# Patient Record
Sex: FEMALE | Race: OTHER | HISPANIC OR LATINO | URBAN - METROPOLITAN AREA
[De-identification: names, ages, dates, MRNs, and addresses within clinical notes are randomized per-mention and may not be internally consistent; named-entity substitution may affect disease eponyms.]

---

## 2021-08-23 ENCOUNTER — EMERGENCY (EMERGENCY)
Facility: HOSPITAL | Age: 56
LOS: 1 days | End: 2021-08-23
Admitting: EMERGENCY MEDICINE
Payer: SELF-PAY

## 2021-08-23 PROCEDURE — 99283 EMERGENCY DEPT VISIT LOW MDM: CPT

## 2023-06-16 ENCOUNTER — APPOINTMENT (OUTPATIENT)
Dept: UROGYNECOLOGY | Facility: CLINIC | Age: 58
End: 2023-06-16
Payer: MEDICAID

## 2023-06-16 VITALS
DIASTOLIC BLOOD PRESSURE: 112 MMHG | HEART RATE: 69 BPM | SYSTOLIC BLOOD PRESSURE: 166 MMHG | TEMPERATURE: 97.5 F | WEIGHT: 120 LBS

## 2023-06-16 DIAGNOSIS — N39.498 OTHER SPECIFIED URINARY INCONTINENCE: ICD-10-CM

## 2023-06-16 DIAGNOSIS — N39.492 POSTURAL (URINARY) INCONTINENCE: ICD-10-CM

## 2023-06-16 PROBLEM — Z00.00 ENCOUNTER FOR PREVENTIVE HEALTH EXAMINATION: Status: ACTIVE | Noted: 2023-06-16

## 2023-06-16 PROCEDURE — 99415 PROLNG CLIN STAFF SVC 1ST HR: CPT

## 2023-06-16 PROCEDURE — 99205 OFFICE O/P NEW HI 60 MIN: CPT

## 2023-06-16 NOTE — DISCUSSION/SUMMARY
[FreeTextEntry1] : The patient and her daughter  Akiko ([phone] ) were counseled regarding the pathophysiology of the above conditions.  The visit took 1-1/2-hour due to extensive need to  the patient and her daughter with use of language line .  There were counseled regarding the risks, benefits, indications, and alternatives of further evaluations studies, as well as various management options. She was given verbal and written information/education on pelvic floor muscle exercises, avoidance of dietary bladder irritants, and other strategies to improve bladder control. She was counseled regarding treatment options for pelvic organ prolapse as well as urinary incontinence including pelvic floor PT, pessary placement and surgical management. AUGS interactive tool was used to explain normal anatomy as well as alteration in pelvic organ support associated with prolapse and stress urinary incontinence.  After a detailed discussion, following management plan was outlined:\par 1.  Discussed vaginal as well as reconstructive surgery for pelvic organ prolapse.  Patient does not have a preference.  Due to advanced stage prolapse and her young age age, I would recommend sacrocolpopexy provided she had normal Pap smears in the past and there is no contraindication for supracervical hysterectomy.  They verbalized understanding.  We have requested records of her previous Pap smears and pelvic ultrasound.  We discussed postop restrictions, recovery.  Etc.\par 2. UA with micro and urine culture was ordered to exclude UTI.\par 3.  Recommended urodynamic testing to screen for occult stress urinary incontinence.  They agreed\par 4.  Patient is interested in having a trial of the pessary while awaiting surgery.  They will return after urodynamic testing to finalize the surgical plan plan and possible  pessary fitting\par 5.  OR booking sheet submitted.

## 2023-06-16 NOTE — HISTORY OF PRESENT ILLNESS
[FreeTextEntry1] : Patient is a 57-year-old para ( x 6  ) who is referred by her OBGYN Dr. Heron Aragon  For evaluation and management of vaginal bulge and urinary incontinence. Patient is accompanied by her daughter Chanda. \par Welsh translation provided by language liner  Sara  [id number]\par Patient reports difficulty voiding. She has to be in certain position to void. It hurts when she urinates. She also states that her cervix has been coming out. She feels it between her legs during walking. She started having these issues in 2023. The urine leakage is getting worse. She has to wear a pad for urine leakage. She is describing increasing difficulty emptying her bladder. She has to push it back but sometimes she is unable to reduce it. She crouches to urinate. She has to wear a pad every day and sometimes the pad rubs against the cervix and she will notice some spotting \par Daytime frequency: 8-9 times \par Nocturia: 5 times per night\par Hx of frequent UTI: denies\par Hx of kidney stones: denies\par Daily fluid intake: water, 1 cup of coffee\par \par Bowel symptoms: She denies constipation. Denies diarrhea. Denies fecal incontinence. She has a referral to schedule colonoscopy. \par \par GYN hx: LMP: at age 46 years. Last pap smear was in 2023.Its unclear if it was LGSIL or NILM. She had a cervical biopsy which was normal. Patient was referred to a GYN Onc. Daughter feels that that was a mistake . They were looking for a UROGYN and the referral to GYN Onc was made by mistake. Patient denies previous hx of abnormal pap smears or HPV. She is a  and has been sexually inactive. \par She had a MMG and breast ultrasound which were benign\par \par PMHx: HLD\par \par PShx: Denies

## 2023-06-16 NOTE — ASSESSMENT
[FreeTextEntry1] : Patient is a 57-year-old grandmultipara with complete procidentia, subjective voiding dysfunction, nocturia and postural urinary incontinence. On examination, there is evidence of urethral hypermobility with a negative empty bladder supine cough stress test, and normal postvoid residual.  Patient and daughter denies history of abnormal Pap smears or postmenopausal bleeding.  She is very bothered by the prolapse and wants to undergo surgery as soon as possible\par

## 2023-06-16 NOTE — PHYSICAL EXAM
[FreeTextEntry1] : General: Not in acute distress, alert and oriented x3.\par Neck: Supple. No lymphadenopathy. \par Abdomen: Soft, and nondistended.  She did report some discomfort on palpation of the suprapubic area.  no obvious hepatosplenomegaly. No obvious hernias. \par Pelvic Exam: Normal external female genitalia. Saddle sensory exam S2 to S4 is intact. Perineal reflexes not visualized. Urethra is hypermobile without prolapse, exudates, or lesions. Cough stress test is negative with and without prolapse reduction. Post void residual was checked with I/O cath and was 20 cc of clear urine.  Complete procidentia noted with leading edge of the prolapse being the cervix 4 cm outside the hymen.  The posterior lip of the cervix appears somewhat inflamed and irritated.  No ulceration of the vaginal epithelium noted.  Bimanual exam reveals a small uterus in normal positioning. No palpable adnexal masses or tenderness.\par POPQ: Aa +3, Ba +4, C +4, TVL 8, D-1, GH 5, PB 4.5, Ap +3, Bp +3\par \par

## 2023-06-19 LAB
APPEARANCE: CLEAR
BACTERIA UR CULT: NORMAL
BACTERIA: NEGATIVE /HPF
BILIRUBIN URINE: NEGATIVE
BLOOD URINE: NEGATIVE
CAST: 0 /LPF
COLOR: YELLOW
EPITHELIAL CELLS: 0 /HPF
GLUCOSE QUALITATIVE U: NEGATIVE MG/DL
KETONES URINE: NEGATIVE MG/DL
LEUKOCYTE ESTERASE URINE: NEGATIVE
MICROSCOPIC-UA: NORMAL
NITRITE URINE: NEGATIVE
PH URINE: 6.5
PROTEIN URINE: NEGATIVE MG/DL
RED BLOOD CELLS URINE: 1 /HPF
SPECIFIC GRAVITY URINE: 1.02
UROBILINOGEN URINE: 0.2 MG/DL
WHITE BLOOD CELLS URINE: 0 /HPF

## 2023-06-20 VITALS — HEIGHT: 65 IN

## 2023-06-21 ENCOUNTER — APPOINTMENT (OUTPATIENT)
Dept: OBGYN | Facility: CLINIC | Age: 58
End: 2023-06-21
Payer: MEDICAID

## 2023-06-21 VITALS
HEIGHT: 65 IN | SYSTOLIC BLOOD PRESSURE: 122 MMHG | BODY MASS INDEX: 21.66 KG/M2 | DIASTOLIC BLOOD PRESSURE: 80 MMHG | WEIGHT: 130 LBS

## 2023-06-21 DIAGNOSIS — R35.1 NOCTURIA: ICD-10-CM

## 2023-06-21 DIAGNOSIS — R39.198 OTHER DIFFICULTIES WITH MICTURITION: ICD-10-CM

## 2023-06-21 PROCEDURE — 99202 OFFICE O/P NEW SF 15 MIN: CPT

## 2023-06-21 NOTE — PLAN
[FreeTextEntry1] : I spent 25 minutes with patient and daughter discussing findings, evaluation, and plan by uro gyn\par Patient and daughter voiced understanding  10 minutes documenting   Total 35 minutes.\par \par she will follow up as scheduled with Uro Gyn   she is anxious to get surgery as soon as possible\par \par patient has follow up with GI 8/2023\par \par medical release signed to obtain pap results, pelvic US, and mammogram report from [facility]\par staff to scan reports into system once obtained\par \par Dr Elizabeth

## 2023-07-25 ENCOUNTER — APPOINTMENT (OUTPATIENT)
Dept: UROGYNECOLOGY | Facility: CLINIC | Age: 58
End: 2023-07-25

## 2023-07-31 ENCOUNTER — APPOINTMENT (OUTPATIENT)
Dept: UROGYNECOLOGY | Facility: CLINIC | Age: 58
End: 2023-07-31
Payer: MEDICAID

## 2023-07-31 PROCEDURE — 51741 ELECTRO-UROFLOWMETRY FIRST: CPT

## 2023-07-31 PROCEDURE — 51729 CYSTOMETROGRAM W/VP&UP: CPT

## 2023-07-31 PROCEDURE — T1013A: CUSTOM

## 2023-07-31 PROCEDURE — 51797 INTRAABDOMINAL PRESSURE TEST: CPT

## 2023-07-31 PROCEDURE — 51784 ANAL/URINARY MUSCLE STUDY: CPT

## 2023-08-03 ENCOUNTER — APPOINTMENT (OUTPATIENT)
Dept: UROGYNECOLOGY | Facility: CLINIC | Age: 58
End: 2023-08-03
Payer: MEDICAID

## 2023-08-03 VITALS
DIASTOLIC BLOOD PRESSURE: 76 MMHG | BODY MASS INDEX: 21.66 KG/M2 | WEIGHT: 130 LBS | SYSTOLIC BLOOD PRESSURE: 110 MMHG | HEIGHT: 65 IN

## 2023-08-03 DIAGNOSIS — N81.3 COMPLETE UTEROVAGINAL PROLAPSE: ICD-10-CM

## 2023-08-03 DIAGNOSIS — R35.0 FREQUENCY OF MICTURITION: ICD-10-CM

## 2023-08-03 DIAGNOSIS — Z46.89 ENCOUNTER FOR FITTING AND ADJUSTMENT OF OTHER SPECIFIED DEVICES: ICD-10-CM

## 2023-08-03 PROCEDURE — 99214 OFFICE O/P EST MOD 30 MIN: CPT

## 2023-08-03 NOTE — PROCEDURE
[New] : new [Good Fit] : fit is not good [Refit] : refit needed [None] : no bleeding [FreeTextEntry1] : 5.  And 6 ring with support pessary, #2, #3, #4 Gellhorn pessaries, #2 and #4 cube pessary

## 2023-08-03 NOTE — DISCUSSION/SUMMARY
[FreeTextEntry1] : Patient decided to defer a trial of other pessaries.  She states that she can wait till the surgery.  She has modified her work schedule.  She is no longer lifting heavy items.  She denies any difficulty voiding.  She denies vaginal bleeding. She is a scheduled to undergo Sacrocolpopexy with supracervical hysterectomy and bilateral salpingectomy in October.  She has no questions regarding the procedure.  Reviewed urodynamic test findings.  Based on above findings, we will not perform concomitant mid urethral sling.  She is aware that of the possibility of requiring an anti-incontinence procedure in the future. The entire visit was conducted with the help of a Liberian translation provided by medical assistant Yahaira Easley

## 2023-08-03 NOTE — HISTORY OF PRESENT ILLNESS
[FreeTextEntry1] : Patient is a 57-year-old Burkinan-speaking grandmultipara with complete procidentia, subjective voiding dysfunction, nocturia and postural urinary incontinence who was initially seen on 6/16/2023. On previous examination, there was evidence of urethral hypermobility with a negative empty bladder supine cough stress test, and normal postvoid residual. Patient and daughter denies history of abnormal Pap smears or postmenopausal bleeding. She is very bothered by the prolapse and wants to undergo surgery.  She underwent urodynamic in July 2023 which showed normal cystometric capacity, normal sensation, normal compliance, presence of detrusor overactivity without   incontinence,  absence of stress urinary incontinence and absence of voiding dysfunction. Patient presents today for pessary fitting.  She is scheduled for surgery in October however she would like to see if she can get some relief from the prolapse in the meantime.

## 2023-08-03 NOTE — ASSESSMENT
[FreeTextEntry1] : Patient with advanced stage prolapse and wide genital hiatus who underwent a trial of multiple pessaries but she was unable to retain it due to large genital hiatus.  Her urodynamic testing is negative for evidence of a stress urinary incontinence with and without prolapse reduction

## 2023-08-03 NOTE — PHYSICAL EXAM
[FreeTextEntry1] : General: Not in acute distress, alert and oriented x3. Pelvic Exam: Wide genital hiatus was dilated to approximately 6 cm on Valsalva with complete procidentia again noted.  Cough stress test is negative with and without prolapse reduction.  Several pessaries were tried including #5 #6 ring with support pessary, #2,# 3 and #4 Gellhorn pessaries, as well as #2 and 4 cube pessaries.  Patient was unable to retain these pessaries.  The pessaries almost expelled with Valsalva.  Patient decided to terminate the trial of pessary fitting.

## 2023-09-12 ENCOUNTER — NON-APPOINTMENT (OUTPATIENT)
Age: 58
End: 2023-09-12

## 2023-09-12 ENCOUNTER — APPOINTMENT (OUTPATIENT)
Dept: COLORECTAL SURGERY | Facility: CLINIC | Age: 58
End: 2023-09-12
Payer: MEDICAID

## 2023-09-12 VITALS
WEIGHT: 132 LBS | HEIGHT: 65 IN | SYSTOLIC BLOOD PRESSURE: 105 MMHG | RESPIRATION RATE: 14 BRPM | HEART RATE: 78 BPM | DIASTOLIC BLOOD PRESSURE: 71 MMHG | BODY MASS INDEX: 21.99 KG/M2

## 2023-09-12 PROCEDURE — 99205 OFFICE O/P NEW HI 60 MIN: CPT

## 2023-09-13 ENCOUNTER — NON-APPOINTMENT (OUTPATIENT)
Age: 58
End: 2023-09-13

## 2023-09-13 LAB
ALBUMIN SERPL ELPH-MCNC: 4.3 G/DL
ALP BLD-CCNC: 113 U/L
ALT SERPL-CCNC: 18 U/L
ANION GAP SERPL CALC-SCNC: 14 MMOL/L
AST SERPL-CCNC: 24 U/L
BASOPHILS # BLD AUTO: 0.03 K/UL
BASOPHILS NFR BLD AUTO: 0.5 %
BILIRUB SERPL-MCNC: 0.5 MG/DL
BUN SERPL-MCNC: 21 MG/DL
CALCIUM SERPL-MCNC: 9.3 MG/DL
CEA SERPL-MCNC: 1.1 NG/ML
CHLORIDE SERPL-SCNC: 104 MMOL/L
CO2 SERPL-SCNC: 22 MMOL/L
CREAT SERPL-MCNC: 0.87 MG/DL
EGFR: 78 ML/MIN/1.73M2
EOSINOPHIL # BLD AUTO: 0.07 K/UL
EOSINOPHIL NFR BLD AUTO: 1.3 %
GLUCOSE SERPL-MCNC: 123 MG/DL
HCT VFR BLD CALC: 41.6 %
HGB BLD-MCNC: 13.5 G/DL
IMM GRANULOCYTES NFR BLD AUTO: 0.2 %
LYMPHOCYTES # BLD AUTO: 1.57 K/UL
LYMPHOCYTES NFR BLD AUTO: 28.1 %
MAN DIFF?: NORMAL
MCHC RBC-ENTMCNC: 29.4 PG
MCHC RBC-ENTMCNC: 32.5 GM/DL
MCV RBC AUTO: 90.6 FL
MONOCYTES # BLD AUTO: 0.48 K/UL
MONOCYTES NFR BLD AUTO: 8.6 %
NEUTROPHILS # BLD AUTO: 3.43 K/UL
NEUTROPHILS NFR BLD AUTO: 61.3 %
PLATELET # BLD AUTO: 283 K/UL
POTASSIUM SERPL-SCNC: 4.6 MMOL/L
PROT SERPL-MCNC: 6.4 G/DL
RBC # BLD: 4.59 M/UL
RBC # FLD: 12.7 %
SODIUM SERPL-SCNC: 139 MMOL/L
WBC # FLD AUTO: 5.59 K/UL

## 2023-09-19 ENCOUNTER — RESULT REVIEW (OUTPATIENT)
Age: 58
End: 2023-09-19

## 2023-09-27 ENCOUNTER — APPOINTMENT (OUTPATIENT)
Dept: RADIOLOGY | Facility: CLINIC | Age: 58
End: 2023-09-27
Payer: MEDICAID

## 2023-09-27 ENCOUNTER — APPOINTMENT (OUTPATIENT)
Dept: CT IMAGING | Facility: CLINIC | Age: 58
End: 2023-09-27
Payer: MEDICAID

## 2023-09-27 ENCOUNTER — RESULT REVIEW (OUTPATIENT)
Age: 58
End: 2023-09-27

## 2023-09-27 ENCOUNTER — OUTPATIENT (OUTPATIENT)
Dept: OUTPATIENT SERVICES | Facility: HOSPITAL | Age: 58
LOS: 1 days | End: 2023-09-27
Payer: COMMERCIAL

## 2023-09-27 ENCOUNTER — OUTPATIENT (OUTPATIENT)
Dept: OUTPATIENT SERVICES | Facility: HOSPITAL | Age: 58
LOS: 1 days | End: 2023-09-27
Payer: SELF-PAY

## 2023-09-27 ENCOUNTER — APPOINTMENT (OUTPATIENT)
Dept: MRI IMAGING | Facility: CLINIC | Age: 58
End: 2023-09-27
Payer: MEDICAID

## 2023-09-27 DIAGNOSIS — C20 MALIGNANT NEOPLASM OF RECTUM: ICD-10-CM

## 2023-09-27 DIAGNOSIS — Z00.8 ENCOUNTER FOR OTHER GENERAL EXAMINATION: ICD-10-CM

## 2023-09-27 PROCEDURE — 71045 X-RAY EXAM CHEST 1 VIEW: CPT | Mod: 26

## 2023-09-27 PROCEDURE — 74177 CT ABD & PELVIS W/CONTRAST: CPT

## 2023-09-27 PROCEDURE — 74018 RADEX ABDOMEN 1 VIEW: CPT | Mod: 26

## 2023-09-27 PROCEDURE — 71260 CT THORAX DX C+: CPT

## 2023-09-27 PROCEDURE — 72197 MRI PELVIS W/O & W/DYE: CPT | Mod: 26

## 2023-09-27 PROCEDURE — 71260 CT THORAX DX C+: CPT | Mod: 26

## 2023-09-27 PROCEDURE — 74018 RADEX ABDOMEN 1 VIEW: CPT

## 2023-09-27 PROCEDURE — A9585: CPT

## 2023-09-27 PROCEDURE — 74177 CT ABD & PELVIS W/CONTRAST: CPT | Mod: 26

## 2023-09-27 PROCEDURE — 72197 MRI PELVIS W/O & W/DYE: CPT

## 2023-09-27 PROCEDURE — 71045 X-RAY EXAM CHEST 1 VIEW: CPT

## 2023-09-28 ENCOUNTER — NON-APPOINTMENT (OUTPATIENT)
Age: 58
End: 2023-09-28

## 2023-10-03 ENCOUNTER — NON-APPOINTMENT (OUTPATIENT)
Age: 58
End: 2023-10-03

## 2023-10-04 ENCOUNTER — RESULT REVIEW (OUTPATIENT)
Age: 58
End: 2023-10-04

## 2023-10-04 ENCOUNTER — OUTPATIENT (OUTPATIENT)
Dept: OUTPATIENT SERVICES | Facility: HOSPITAL | Age: 58
LOS: 1 days | End: 2023-10-04
Payer: MEDICAID

## 2023-10-04 VITALS
RESPIRATION RATE: 16 BRPM | SYSTOLIC BLOOD PRESSURE: 178 MMHG | HEIGHT: 62 IN | WEIGHT: 121.25 LBS | TEMPERATURE: 98 F | OXYGEN SATURATION: 100 % | DIASTOLIC BLOOD PRESSURE: 90 MMHG | HEART RATE: 64 BPM

## 2023-10-04 DIAGNOSIS — Z01.818 ENCOUNTER FOR OTHER PREPROCEDURAL EXAMINATION: ICD-10-CM

## 2023-10-04 DIAGNOSIS — Z98.890 OTHER SPECIFIED POSTPROCEDURAL STATES: Chronic | ICD-10-CM

## 2023-10-04 DIAGNOSIS — Z29.9 ENCOUNTER FOR PROPHYLACTIC MEASURES, UNSPECIFIED: ICD-10-CM

## 2023-10-04 LAB
A1C WITH ESTIMATED AVERAGE GLUCOSE RESULT: 5.8 % — HIGH (ref 4–5.6)
ABO RH CONFIRMATION: SIGNIFICANT CHANGE UP
ALBUMIN SERPL ELPH-MCNC: 3.9 G/DL — SIGNIFICANT CHANGE UP (ref 3.3–5)
ALP SERPL-CCNC: 126 U/L — HIGH (ref 40–120)
ALT FLD-CCNC: 56 U/L — SIGNIFICANT CHANGE UP (ref 12–78)
ANION GAP SERPL CALC-SCNC: 1 MMOL/L — LOW (ref 5–17)
APTT BLD: 39.7 SEC — HIGH (ref 24.5–35.6)
AST SERPL-CCNC: 34 U/L — SIGNIFICANT CHANGE UP (ref 15–37)
BASOPHILS # BLD AUTO: 0.03 K/UL — SIGNIFICANT CHANGE UP (ref 0–0.2)
BASOPHILS NFR BLD AUTO: 0.6 % — SIGNIFICANT CHANGE UP (ref 0–2)
BILIRUB DIRECT SERPL-MCNC: 0.1 MG/DL — SIGNIFICANT CHANGE UP (ref 0–0.3)
BILIRUB INDIRECT FLD-MCNC: 0.4 MG/DL — SIGNIFICANT CHANGE UP (ref 0.2–1)
BILIRUB SERPL-MCNC: 0.5 MG/DL — SIGNIFICANT CHANGE UP (ref 0.2–1.2)
BLD GP AB SCN SERPL QL: SIGNIFICANT CHANGE UP
BUN SERPL-MCNC: 12 MG/DL — SIGNIFICANT CHANGE UP (ref 7–23)
CALCIUM SERPL-MCNC: 9.5 MG/DL — SIGNIFICANT CHANGE UP (ref 8.5–10.1)
CEA SERPL-MCNC: 1.6 NG/ML — SIGNIFICANT CHANGE UP (ref 0–3.8)
CHLORIDE SERPL-SCNC: 110 MMOL/L — HIGH (ref 96–108)
CO2 SERPL-SCNC: 29 MMOL/L — SIGNIFICANT CHANGE UP (ref 22–31)
CREAT SERPL-MCNC: 0.68 MG/DL — SIGNIFICANT CHANGE UP (ref 0.5–1.3)
EGFR: 102 ML/MIN/1.73M2 — SIGNIFICANT CHANGE UP
EOSINOPHIL # BLD AUTO: 0.15 K/UL — SIGNIFICANT CHANGE UP (ref 0–0.5)
EOSINOPHIL NFR BLD AUTO: 2.8 % — SIGNIFICANT CHANGE UP (ref 0–6)
ESTIMATED AVERAGE GLUCOSE: 120 MG/DL — HIGH (ref 68–114)
GLUCOSE SERPL-MCNC: 119 MG/DL — HIGH (ref 70–99)
HCT VFR BLD CALC: 43.8 % — SIGNIFICANT CHANGE UP (ref 34.5–45)
HGB BLD-MCNC: 14.7 G/DL — SIGNIFICANT CHANGE UP (ref 11.5–15.5)
IMM GRANULOCYTES NFR BLD AUTO: 0 % — SIGNIFICANT CHANGE UP (ref 0–0.9)
INR BLD: 1.02 RATIO — SIGNIFICANT CHANGE UP (ref 0.85–1.18)
LYMPHOCYTES # BLD AUTO: 1.73 K/UL — SIGNIFICANT CHANGE UP (ref 1–3.3)
LYMPHOCYTES # BLD AUTO: 31.7 % — SIGNIFICANT CHANGE UP (ref 13–44)
MCHC RBC-ENTMCNC: 29.2 PG — SIGNIFICANT CHANGE UP (ref 27–34)
MCHC RBC-ENTMCNC: 33.6 GM/DL — SIGNIFICANT CHANGE UP (ref 32–36)
MCV RBC AUTO: 86.9 FL — SIGNIFICANT CHANGE UP (ref 80–100)
MONOCYTES # BLD AUTO: 0.42 K/UL — SIGNIFICANT CHANGE UP (ref 0–0.9)
MONOCYTES NFR BLD AUTO: 7.7 % — SIGNIFICANT CHANGE UP (ref 2–14)
NEUTROPHILS # BLD AUTO: 3.12 K/UL — SIGNIFICANT CHANGE UP (ref 1.8–7.4)
NEUTROPHILS NFR BLD AUTO: 57.2 % — SIGNIFICANT CHANGE UP (ref 43–77)
PLATELET # BLD AUTO: 265 K/UL — SIGNIFICANT CHANGE UP (ref 150–400)
POTASSIUM SERPL-MCNC: 3.9 MMOL/L — SIGNIFICANT CHANGE UP (ref 3.5–5.3)
POTASSIUM SERPL-SCNC: 3.9 MMOL/L — SIGNIFICANT CHANGE UP (ref 3.5–5.3)
PROT SERPL-MCNC: 7.6 GM/DL — SIGNIFICANT CHANGE UP (ref 6–8.3)
PROTHROM AB SERPL-ACNC: 11.5 SEC — SIGNIFICANT CHANGE UP (ref 9.5–13)
RBC # BLD: 5.04 M/UL — SIGNIFICANT CHANGE UP (ref 3.8–5.2)
RBC # FLD: 12.5 % — SIGNIFICANT CHANGE UP (ref 10.3–14.5)
SODIUM SERPL-SCNC: 140 MMOL/L — SIGNIFICANT CHANGE UP (ref 135–145)
WBC # BLD: 5.45 K/UL — SIGNIFICANT CHANGE UP (ref 3.8–10.5)
WBC # FLD AUTO: 5.45 K/UL — SIGNIFICANT CHANGE UP (ref 3.8–10.5)

## 2023-10-04 PROCEDURE — T1013: CPT

## 2023-10-04 PROCEDURE — 80048 BASIC METABOLIC PNL TOTAL CA: CPT

## 2023-10-04 PROCEDURE — 71046 X-RAY EXAM CHEST 2 VIEWS: CPT

## 2023-10-04 PROCEDURE — 86901 BLOOD TYPING SEROLOGIC RH(D): CPT

## 2023-10-04 PROCEDURE — 82378 CARCINOEMBRYONIC ANTIGEN: CPT

## 2023-10-04 PROCEDURE — 86920 COMPATIBILITY TEST SPIN: CPT

## 2023-10-04 PROCEDURE — 83036 HEMOGLOBIN GLYCOSYLATED A1C: CPT

## 2023-10-04 PROCEDURE — 86850 RBC ANTIBODY SCREEN: CPT

## 2023-10-04 PROCEDURE — 71046 X-RAY EXAM CHEST 2 VIEWS: CPT | Mod: 26

## 2023-10-04 PROCEDURE — 85025 COMPLETE CBC W/AUTO DIFF WBC: CPT

## 2023-10-04 PROCEDURE — 85730 THROMBOPLASTIN TIME PARTIAL: CPT

## 2023-10-04 PROCEDURE — 36415 COLL VENOUS BLD VENIPUNCTURE: CPT

## 2023-10-04 PROCEDURE — 93005 ELECTROCARDIOGRAM TRACING: CPT

## 2023-10-04 PROCEDURE — 80076 HEPATIC FUNCTION PANEL: CPT

## 2023-10-04 PROCEDURE — 86900 BLOOD TYPING SEROLOGIC ABO: CPT

## 2023-10-04 PROCEDURE — 85610 PROTHROMBIN TIME: CPT

## 2023-10-04 PROCEDURE — 99214 OFFICE O/P EST MOD 30 MIN: CPT | Mod: 25

## 2023-10-04 PROCEDURE — 93010 ELECTROCARDIOGRAM REPORT: CPT

## 2023-10-04 NOTE — H&P PST ADULT - ASSESSMENT
57 y.o female  57 y.o female scheduled for  Laparoscopic possible Open Low Anterior Resection, Mobilization of Splenic Flexure, Rigid Proctosigmoidoscopy, Lymph Node Dissection, and possible Ileostomy   Plan  1. Stop all NSAIDS, herbal supplements and vitamins for 7 days.  2. NPO at midnight.  3. Take the following medications --none-- with small sips of water on the morning of your procedure/surgery.  4. Use EZ sponges as directed  5. Labs, EKG, CXR  as per surgeon  6. PMD E. Dodd visit for optimization prior to surgery as per surgeon  7. Preprocedure education provided     CAPRINI SCORE    AGE RELATED RISK FACTORS                                                       MOBILITY RELATED FACTORS  [x ] Age 41-60 years                                            (1 Point)                  [ ] Bed rest                                                        (1 Point)  [ ] Age: 61-74 years                                           (2 Points)                [ ] Plaster cast                                                   (2 Points)  [ ] Age= 75 years                                              (3 Points)                 [ ] Bed bound for more than 72 hours                   (2 Points)    DISEASE RELATED RISK FACTORS                                               GENDER SPECIFIC FACTORS  [ ] Edema in the lower extremities                       (1 Point)                  [ ] Pregnancy                                                     (1 Point)  [ ] Varicose veins                                               (1 Point)                  [ ] Post-partum < 6 weeks                                   (1 Point)             [ x] BMI > 25 Kg/m2                                            (1 Point)                  [ ] Hormonal therapy  or oral contraception            (1 Point)                 [ ] Sepsis (in the previous month)                        (1 Point)                  [ ] History of pregnancy complications  [ ] Pneumonia or serious lung disease                                               [ ] Unexplained or recurrent                       (1 Point)           (in the previous month)                               (1 Point)  [ ] Abnormal pulmonary function test                     (1 Point)                 SURGERY RELATED RISK FACTORS  [ ] Acute myocardial infarction                              (1 Point)                 [ ]  Section                                            (1 Point)  [ ] Congestive heart failure (in the previous month)  (1 Point)                 [ ] Minor surgery                                                 (1 Point)   [ ] Inflammatory bowel disease                             (1 Point)                 [ ] Arthroscopic surgery                                        (2 Points)  [ ] Central venous access                                    (2 Points)                [ x] General surgery lasting more than 45 minutes   (2 Points)       [ ] Stroke (in the previous month)                          (5 Points)               [ ] Elective arthroplasty                                        (5 Points)                                                                                                                                               HEMATOLOGY RELATED FACTORS                                                 TRAUMA RELATED RISK FACTORS  [ ] Prior episodes of VTE                                     (3 Points)                 [ ] Fracture of the hip, pelvis, or leg                       (5 Points)  [ ] Positive family history for VTE                         (3 Points)                 [ ] Acute spinal cord injury (in the previous month)  (5 Points)  [ ] Prothrombin 36090 A                                      (3 Points)                 [ ] Paralysis  (less than 1 month)                          (5 Points)  [ ] Factor V Leiden                                             (3 Points)                 [ ] Multiple Trauma within 1 month                         (5 Points)  [ ] Lupus anticoagulants                                     (3 Points)                                                           [ ] Anticardiolipin antibodies                                (3 Points)                                                       [ ] High homocysteine in the blood                      (3 Points)                                             [ ] Other congenital or acquired thrombophilia       (3 Points)                                                [ ] Heparin induced thrombocytopenia                  (3 Points)                                          Total Score [      4    ]    The Caprini score indicates this patient is at risk for a VTE event (score 3-5).  Most surgical patients in this group would benefit from pharmacologic prophylaxis.  The surgical team will determine the balance between VTE risk and bleeding risk

## 2023-10-04 NOTE — H&P PST ADULT - HISTORY OF PRESENT ILLNESS
57 y.o WD, WN Italian speaking female presents to PST with recent dx of rectal cancer. Patient reports a change in her bowel habits and rectal bleeding.  57 y.o WD, WN Slovenian speaking female presents to [facility] with recent dx of rectal cancer. Patient reports a change in her bowel habits and rectal bleeding. She had a colonoscopy which revealed a rectal mass with +biopsy.  Patient has followed with colorectal surgeon and scheduled for a Laparoscopic possible Open Low Anterior Resection, Mobilization of Splenic Flexure, Rigid Proctosigmoidoscopy, Lymph Node Dissection, and possible Ileostomy

## 2023-10-04 NOTE — H&P PST ADULT - GENITOURINARY COMMENTS
uterine prolapse---Patient states she needs to adjust her positioning when urinating "squats to urinate"

## 2023-10-05 DIAGNOSIS — Z01.818 ENCOUNTER FOR OTHER PREPROCEDURAL EXAMINATION: ICD-10-CM

## 2023-10-05 RX ORDER — SODIUM CHLORIDE 9 MG/ML
3 INJECTION INTRAMUSCULAR; INTRAVENOUS; SUBCUTANEOUS EVERY 8 HOURS
Refills: 0 | Status: DISCONTINUED | OUTPATIENT
Start: 2023-10-12 | End: 2023-10-16

## 2023-10-12 ENCOUNTER — INPATIENT (INPATIENT)
Facility: HOSPITAL | Age: 58
LOS: 3 days | Discharge: HOME | DRG: 221 | End: 2023-10-16
Attending: COLON & RECTAL SURGERY | Admitting: COLON & RECTAL SURGERY
Payer: MEDICAID

## 2023-10-12 ENCOUNTER — TRANSCRIPTION ENCOUNTER (OUTPATIENT)
Age: 58
End: 2023-10-12

## 2023-10-12 ENCOUNTER — APPOINTMENT (OUTPATIENT)
Dept: COLORECTAL SURGERY | Facility: HOSPITAL | Age: 58
End: 2023-10-12

## 2023-10-12 ENCOUNTER — RESULT REVIEW (OUTPATIENT)
Age: 58
End: 2023-10-12

## 2023-10-12 VITALS
SYSTOLIC BLOOD PRESSURE: 146 MMHG | WEIGHT: 132.06 LBS | DIASTOLIC BLOOD PRESSURE: 91 MMHG | OXYGEN SATURATION: 100 % | RESPIRATION RATE: 16 BRPM | HEART RATE: 75 BPM | HEIGHT: 62 IN | TEMPERATURE: 98 F

## 2023-10-12 DIAGNOSIS — C77.2 SECONDARY AND UNSPECIFIED MALIGNANT NEOPLASM OF INTRA-ABDOMINAL LYMPH NODES: ICD-10-CM

## 2023-10-12 DIAGNOSIS — C19 MALIGNANT NEOPLASM OF RECTOSIGMOID JUNCTION: ICD-10-CM

## 2023-10-12 DIAGNOSIS — Z98.890 OTHER SPECIFIED POSTPROCEDURAL STATES: Chronic | ICD-10-CM

## 2023-10-12 DIAGNOSIS — C20 MALIGNANT NEOPLASM OF RECTUM: ICD-10-CM

## 2023-10-12 LAB
GLUCOSE BLDC GLUCOMTR-MCNC: 101 MG/DL — HIGH (ref 70–99)
GLUCOSE BLDC GLUCOMTR-MCNC: 150 MG/DL — HIGH (ref 70–99)
GLUCOSE BLDC GLUCOMTR-MCNC: 150 MG/DL — HIGH (ref 70–99)
GLUCOSE BLDC GLUCOMTR-MCNC: 160 MG/DL — HIGH (ref 70–99)
GLUCOSE BLDC GLUCOMTR-MCNC: 82 MG/DL — SIGNIFICANT CHANGE UP (ref 70–99)
GLUCOSE BLDC GLUCOMTR-MCNC: 82 MG/DL — SIGNIFICANT CHANGE UP (ref 70–99)

## 2023-10-12 PROCEDURE — 45397 LAP REMOVE RECTUM W/POUCH: CPT

## 2023-10-12 PROCEDURE — 88305 TISSUE EXAM BY PATHOLOGIST: CPT

## 2023-10-12 PROCEDURE — 88309 TISSUE EXAM BY PATHOLOGIST: CPT | Mod: 26

## 2023-10-12 PROCEDURE — 45300 PROCTOSIGMOIDOSCOPY DX: CPT | Mod: AS

## 2023-10-12 PROCEDURE — 85027 COMPLETE CBC AUTOMATED: CPT

## 2023-10-12 PROCEDURE — 36415 COLL VENOUS BLD VENIPUNCTURE: CPT

## 2023-10-12 PROCEDURE — 38570 LAPAROSCOPY LYMPH NODE BIOP: CPT

## 2023-10-12 PROCEDURE — 45300 PROCTOSIGMOIDOSCOPY DX: CPT

## 2023-10-12 PROCEDURE — 88309 TISSUE EXAM BY PATHOLOGIST: CPT

## 2023-10-12 PROCEDURE — C1889: CPT

## 2023-10-12 PROCEDURE — 80048 BASIC METABOLIC PNL TOTAL CA: CPT

## 2023-10-12 PROCEDURE — 82962 GLUCOSE BLOOD TEST: CPT

## 2023-10-12 PROCEDURE — 45397 LAP REMOVE RECTUM W/POUCH: CPT | Mod: AS

## 2023-10-12 PROCEDURE — 84100 ASSAY OF PHOSPHORUS: CPT

## 2023-10-12 PROCEDURE — 38570 LAPAROSCOPY LYMPH NODE BIOP: CPT | Mod: AS

## 2023-10-12 PROCEDURE — 88305 TISSUE EXAM BY PATHOLOGIST: CPT | Mod: 26

## 2023-10-12 PROCEDURE — C1765: CPT

## 2023-10-12 PROCEDURE — 83735 ASSAY OF MAGNESIUM: CPT

## 2023-10-12 RX ORDER — KETOROLAC TROMETHAMINE 30 MG/ML
15 SYRINGE (ML) INJECTION EVERY 6 HOURS
Refills: 0 | Status: DISCONTINUED | OUTPATIENT
Start: 2023-10-12 | End: 2023-10-13

## 2023-10-12 RX ORDER — PANTOPRAZOLE SODIUM 20 MG/1
40 TABLET, DELAYED RELEASE ORAL DAILY
Refills: 0 | Status: DISCONTINUED | OUTPATIENT
Start: 2023-10-12 | End: 2023-10-16

## 2023-10-12 RX ORDER — HEPARIN SODIUM 5000 [USP'U]/ML
5000 INJECTION INTRAVENOUS; SUBCUTANEOUS ONCE
Refills: 0 | Status: COMPLETED | OUTPATIENT
Start: 2023-10-12 | End: 2023-10-12

## 2023-10-12 RX ORDER — CEFOTETAN DISODIUM 1 G
2 VIAL (EA) INJECTION ONCE
Refills: 0 | Status: COMPLETED | OUTPATIENT
Start: 2023-10-13 | End: 2023-10-13

## 2023-10-12 RX ORDER — HYDROMORPHONE HYDROCHLORIDE 2 MG/ML
0.5 INJECTION INTRAMUSCULAR; INTRAVENOUS; SUBCUTANEOUS
Refills: 0 | Status: DISCONTINUED | OUTPATIENT
Start: 2023-10-12 | End: 2023-10-12

## 2023-10-12 RX ORDER — FENTANYL CITRATE 50 UG/ML
25 INJECTION INTRAVENOUS
Refills: 0 | Status: DISCONTINUED | OUTPATIENT
Start: 2023-10-12 | End: 2023-10-12

## 2023-10-12 RX ORDER — OXYCODONE HYDROCHLORIDE 5 MG/1
5 TABLET ORAL EVERY 4 HOURS
Refills: 0 | Status: DISCONTINUED | OUTPATIENT
Start: 2023-10-12 | End: 2023-10-16

## 2023-10-12 RX ORDER — SODIUM CHLORIDE 9 MG/ML
1000 INJECTION, SOLUTION INTRAVENOUS
Refills: 0 | Status: DISCONTINUED | OUTPATIENT
Start: 2023-10-12 | End: 2023-10-12

## 2023-10-12 RX ORDER — ENOXAPARIN SODIUM 100 MG/ML
40 INJECTION SUBCUTANEOUS EVERY 24 HOURS
Refills: 0 | Status: DISCONTINUED | OUTPATIENT
Start: 2023-10-13 | End: 2023-10-16

## 2023-10-12 RX ORDER — ALVIMOPAN 12 MG/1
12 CAPSULE ORAL ONCE
Refills: 0 | Status: COMPLETED | OUTPATIENT
Start: 2023-10-12 | End: 2023-10-12

## 2023-10-12 RX ORDER — INFLUENZA VIRUS VACCINE 15; 15; 15; 15 UG/.5ML; UG/.5ML; UG/.5ML; UG/.5ML
0.5 SUSPENSION INTRAMUSCULAR ONCE
Refills: 0 | Status: DISCONTINUED | OUTPATIENT
Start: 2023-10-12 | End: 2023-10-16

## 2023-10-12 RX ORDER — ONDANSETRON 8 MG/1
4 TABLET, FILM COATED ORAL EVERY 6 HOURS
Refills: 0 | Status: DISCONTINUED | OUTPATIENT
Start: 2023-10-12 | End: 2023-10-16

## 2023-10-12 RX ORDER — SODIUM CHLORIDE 9 MG/ML
1000 INJECTION, SOLUTION INTRAVENOUS
Refills: 0 | Status: DISCONTINUED | OUTPATIENT
Start: 2023-10-12 | End: 2023-10-16

## 2023-10-12 RX ORDER — OXYCODONE HYDROCHLORIDE 5 MG/1
10 TABLET ORAL EVERY 4 HOURS
Refills: 0 | Status: DISCONTINUED | OUTPATIENT
Start: 2023-10-12 | End: 2023-10-16

## 2023-10-12 RX ORDER — ONDANSETRON 8 MG/1
4 TABLET, FILM COATED ORAL ONCE
Refills: 0 | Status: DISCONTINUED | OUTPATIENT
Start: 2023-10-12 | End: 2023-10-12

## 2023-10-12 RX ORDER — ACETAMINOPHEN 500 MG
1000 TABLET ORAL EVERY 6 HOURS
Refills: 0 | Status: COMPLETED | OUTPATIENT
Start: 2023-10-12 | End: 2023-10-13

## 2023-10-12 RX ORDER — ALVIMOPAN 12 MG/1
12 CAPSULE ORAL EVERY 12 HOURS
Refills: 0 | Status: DISCONTINUED | OUTPATIENT
Start: 2023-10-13 | End: 2023-10-15

## 2023-10-12 RX ADMIN — Medication 1000 MILLIGRAM(S): at 23:02

## 2023-10-12 RX ADMIN — ALVIMOPAN 12 MILLIGRAM(S): 12 CAPSULE ORAL at 10:44

## 2023-10-12 RX ADMIN — Medication 15 MILLIGRAM(S): at 22:32

## 2023-10-12 RX ADMIN — SODIUM CHLORIDE 125 MILLILITER(S): 9 INJECTION, SOLUTION INTRAVENOUS at 22:36

## 2023-10-12 RX ADMIN — FENTANYL CITRATE 25 MICROGRAM(S): 50 INJECTION INTRAVENOUS at 16:21

## 2023-10-12 RX ADMIN — Medication 400 MILLIGRAM(S): at 22:32

## 2023-10-12 RX ADMIN — HEPARIN SODIUM 5000 UNIT(S): 5000 INJECTION INTRAVENOUS; SUBCUTANEOUS at 10:43

## 2023-10-12 RX ADMIN — Medication 15 MILLIGRAM(S): at 23:02

## 2023-10-12 RX ADMIN — FENTANYL CITRATE 25 MICROGRAM(S): 50 INJECTION INTRAVENOUS at 16:06

## 2023-10-12 NOTE — BRIEF OPERATIVE NOTE - NSICDXBRIEFPROCEDURE_GEN_ALL_CORE_FT
PROCEDURES:  Laparoscopic low anterior resection 12-Oct-2023 16:02:35  Josefina Hu  Proctectomy, completion, with diverting loop ileostomy 12-Oct-2023 16:03:02  Josefina Hu  Mobilization of splenic flexure 12-Oct-2023 16:03:22  Josefina Hu  Rigid proctosigmoidoscopy 12-Oct-2023 16:03:32  Josefina Hu  Abdominal lymph node dissection 12-Oct-2023 16:03:45  Josefina Hu

## 2023-10-12 NOTE — PATIENT PROFILE ADULT - HEALTH LITERACY
Anesthesia Post Evaluation    Patient: Elizabeth Maldonado    Procedure(s) Performed: Procedure(s) (LRB):  TRANSPLANT, KIDNEY (N/A)    Final Anesthesia Type: general      Patient location during evaluation: PACU  Patient participation: Yes- Able to Participate  Level of consciousness: awake and alert and oriented  Post-procedure vital signs: reviewed and stable  Pain management: adequate  Airway patency: patent    PONV status at discharge: No PONV  Anesthetic complications: no      Cardiovascular status: hemodynamically stable  Respiratory status: unassisted, spontaneous ventilation and room air  Hydration status: euvolemic  Follow-up not needed.        Event Time   Out of Recovery 15:45:00         Pain/Eric Score: No data recorded       no

## 2023-10-12 NOTE — BRIEF OPERATIVE NOTE - OPERATION/FINDINGS
Rectal mass in the rectum within tattoo margins. Low anterior resection and loop ileostomy creation without complications.

## 2023-10-12 NOTE — ASU PREOP CHECKLIST - SPO2 (%)
Dermatology Rooming Note    Yaritza Vuong 's goals for this visit include:   Chief Complaint   Patient presents with     Sharmila Vigil is here today for an acctane follow up.      COREEN Beltre      100

## 2023-10-12 NOTE — BRIEF OPERATIVE NOTE - COMMENTS
I, ROSI Umaña, was present with Dr. De León for the entirety of the procedure, assisting during all key portions of the procedure, including the  skin closure aspect. For further details, please refer to his operative dictation.

## 2023-10-13 LAB
ANION GAP SERPL CALC-SCNC: 4 MMOL/L — LOW (ref 5–17)
BUN SERPL-MCNC: 9 MG/DL — SIGNIFICANT CHANGE UP (ref 7–23)
CALCIUM SERPL-MCNC: 8.6 MG/DL — SIGNIFICANT CHANGE UP (ref 8.5–10.1)
CHLORIDE SERPL-SCNC: 108 MMOL/L — SIGNIFICANT CHANGE UP (ref 96–108)
CO2 SERPL-SCNC: 27 MMOL/L — SIGNIFICANT CHANGE UP (ref 22–31)
CREAT SERPL-MCNC: 1.07 MG/DL — SIGNIFICANT CHANGE UP (ref 0.5–1.3)
EGFR: 61 ML/MIN/1.73M2 — SIGNIFICANT CHANGE UP
GLUCOSE BLDC GLUCOMTR-MCNC: 119 MG/DL — HIGH (ref 70–99)
GLUCOSE BLDC GLUCOMTR-MCNC: 127 MG/DL — HIGH (ref 70–99)
GLUCOSE BLDC GLUCOMTR-MCNC: 134 MG/DL — HIGH (ref 70–99)
GLUCOSE BLDC GLUCOMTR-MCNC: 142 MG/DL — HIGH (ref 70–99)
GLUCOSE SERPL-MCNC: 119 MG/DL — HIGH (ref 70–99)
HCT VFR BLD CALC: 35.8 % — SIGNIFICANT CHANGE UP (ref 34.5–45)
HGB BLD-MCNC: 12 G/DL — SIGNIFICANT CHANGE UP (ref 11.5–15.5)
MAGNESIUM SERPL-MCNC: 2.1 MG/DL — SIGNIFICANT CHANGE UP (ref 1.6–2.6)
MCHC RBC-ENTMCNC: 29 PG — SIGNIFICANT CHANGE UP (ref 27–34)
MCHC RBC-ENTMCNC: 33.5 GM/DL — SIGNIFICANT CHANGE UP (ref 32–36)
MCV RBC AUTO: 86.5 FL — SIGNIFICANT CHANGE UP (ref 80–100)
PHOSPHATE SERPL-MCNC: 3.5 MG/DL — SIGNIFICANT CHANGE UP (ref 2.5–4.5)
PLATELET # BLD AUTO: 259 K/UL — SIGNIFICANT CHANGE UP (ref 150–400)
POTASSIUM SERPL-MCNC: 4.1 MMOL/L — SIGNIFICANT CHANGE UP (ref 3.5–5.3)
POTASSIUM SERPL-SCNC: 4.1 MMOL/L — SIGNIFICANT CHANGE UP (ref 3.5–5.3)
RBC # BLD: 4.14 M/UL — SIGNIFICANT CHANGE UP (ref 3.8–5.2)
RBC # FLD: 12.6 % — SIGNIFICANT CHANGE UP (ref 10.3–14.5)
SODIUM SERPL-SCNC: 139 MMOL/L — SIGNIFICANT CHANGE UP (ref 135–145)
WBC # BLD: 7.8 K/UL — SIGNIFICANT CHANGE UP (ref 3.8–10.5)
WBC # FLD AUTO: 7.8 K/UL — SIGNIFICANT CHANGE UP (ref 3.8–10.5)

## 2023-10-13 PROCEDURE — 99252 IP/OBS CONSLTJ NEW/EST SF 35: CPT

## 2023-10-13 RX ADMIN — Medication 400 MILLIGRAM(S): at 16:41

## 2023-10-13 RX ADMIN — Medication 1000 MILLIGRAM(S): at 16:41

## 2023-10-13 RX ADMIN — Medication 1000 MILLIGRAM(S): at 11:21

## 2023-10-13 RX ADMIN — SODIUM CHLORIDE 3 MILLILITER(S): 9 INJECTION INTRAMUSCULAR; INTRAVENOUS; SUBCUTANEOUS at 14:18

## 2023-10-13 RX ADMIN — Medication 400 MILLIGRAM(S): at 11:21

## 2023-10-13 RX ADMIN — Medication 15 MILLIGRAM(S): at 04:59

## 2023-10-13 RX ADMIN — SODIUM CHLORIDE 3 MILLILITER(S): 9 INJECTION INTRAMUSCULAR; INTRAVENOUS; SUBCUTANEOUS at 00:52

## 2023-10-13 RX ADMIN — ALVIMOPAN 12 MILLIGRAM(S): 12 CAPSULE ORAL at 21:14

## 2023-10-13 RX ADMIN — ALVIMOPAN 12 MILLIGRAM(S): 12 CAPSULE ORAL at 10:03

## 2023-10-13 RX ADMIN — SODIUM CHLORIDE 3 MILLILITER(S): 9 INJECTION INTRAMUSCULAR; INTRAVENOUS; SUBCUTANEOUS at 04:19

## 2023-10-13 RX ADMIN — Medication 100 GRAM(S): at 04:50

## 2023-10-13 RX ADMIN — Medication 15 MILLIGRAM(S): at 09:38

## 2023-10-13 RX ADMIN — Medication 15 MILLIGRAM(S): at 04:49

## 2023-10-13 RX ADMIN — PANTOPRAZOLE SODIUM 40 MILLIGRAM(S): 20 TABLET, DELAYED RELEASE ORAL at 09:38

## 2023-10-13 RX ADMIN — Medication 1000 MILLIGRAM(S): at 04:59

## 2023-10-13 RX ADMIN — OXYCODONE HYDROCHLORIDE 5 MILLIGRAM(S): 5 TABLET ORAL at 14:20

## 2023-10-13 RX ADMIN — Medication 15 MILLIGRAM(S): at 16:41

## 2023-10-13 RX ADMIN — ENOXAPARIN SODIUM 40 MILLIGRAM(S): 100 INJECTION SUBCUTANEOUS at 04:50

## 2023-10-13 RX ADMIN — SODIUM CHLORIDE 3 MILLILITER(S): 9 INJECTION INTRAMUSCULAR; INTRAVENOUS; SUBCUTANEOUS at 21:49

## 2023-10-13 RX ADMIN — Medication 400 MILLIGRAM(S): at 04:49

## 2023-10-13 RX ADMIN — OXYCODONE HYDROCHLORIDE 5 MILLIGRAM(S): 5 TABLET ORAL at 13:50

## 2023-10-13 NOTE — CONSULT NOTE ADULT - SUBJECTIVE AND OBJECTIVE BOX
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 Hospitalist service consulted for medical comanagement   PT seen and examined on 2N. Doing well. Minimal pain.tolerating liquids. ambulating. k[place]land in place. ostomy with liquid .    ROS: all 10 systems reviewed and is as above otherwise negative.     PMH: as above  PSH: denies  F/H: denies significant medical history in immediate family members  Social: no tobacco, occasional etoh  Allergies: NKDA  Home meds: see med rec    Vital Signs Last 24 Hrs  T(C): 37.1 (13 Oct 2023 11:37), Max: 37.5 (13 Oct 2023 00:00)  T(F): 98.8 (13 Oct 2023 11:37), Max: 99.5 (13 Oct 2023 00:00)  HR: 72 (13 Oct 2023 11:37) (58 - 78)  BP: 108/72 (13 Oct 2023 11:37) (100/58 - 116/82)  BP(mean): 76 (12 Oct 2023 18:05) (76 - 76)  RR: 18 (13 Oct 2023 11:37) (12 - 18)  SpO2: 95% (13 Oct 2023 11:37) (95% - 100%)    Parameters below as of 13 Oct 2023 11:37  Patient On (Oxygen Delivery Method): room air    PHYSICAL EXAM:    GENERAL: Comfortable, no acute distress   HEAD:  Normocephalic, atraumatic  EYES: EOMI, PERRLA  HEENT: Moist mucous membranes  NECK: Supple, No JVD  NERVOUS SYSTEM:  Alert & Oriented X3, Motor Strength 5/5 B/L upper and lower extremities  CHEST/LUNG: Clear to auscultation bilaterally  HEART: Regular rate and rhythm  ABDOMEN: Soft,  appropriate post op tenderness, ostomy with brown liquid, BS+  GENITOURINARY: Voiding, no palpable bladder  EXTREMITIES:   No clubbing, cyanosis, or edema  MUSCULOSKELETAL- No muscle tenderness, no joint tenderness  SKIN-no rash    LABS:                        12.0   7.80  )-----------( 259      ( 13 Oct 2023 07:38 )             35.8     10-13    139  |  108  |  9   ----------------------------<  119<H>  4.1   |  27  |  1.07    Ca    8.6      13 Oct 2023 07:38  Phos  3.5     10-13  Mg     2.1     10-13        Urinalysis Basic - ( 13 Oct 2023 07:38 )    Color: x / Appearance: x / SG: x / pH: x  Gluc: 119 mg/dL / Ketone: x  / Bili: x / Urobili: x   Blood: x / Protein: x / Nitrite: x   Leuk Esterase: x / RBC: x / WBC x   Sq Epi: x / Non Sq Epi: x / Bacteria: x    MEDICATIONS  (STANDING):  acetaminophen   IVPB .. 1000 milliGRAM(s) IV Intermittent every 6 hours  alvimopan 12 milliGRAM(s) Oral every 12 hours  enoxaparin Injectable 40 milliGRAM(s) SubCutaneous every 24 hours  influenza   Vaccine 0.5 milliLiter(s) IntraMuscular once  ketorolac   Injectable 15 milliGRAM(s) IV Push every 6 hours  lactated ringers. 1000 milliLiter(s) (75 mL/Hr) IV Continuous <Continuous>  pantoprazole    Tablet 40 milliGRAM(s) Oral daily  sodium chloride 0.9% lock flush 3 milliLiter(s) IV Push every 8 hours    MEDICATIONS  (PRN):  ondansetron Injectable 4 milliGRAM(s) IV Push every 6 hours PRN Nausea and/or Vomiting  oxyCODONE    IR 5 milliGRAM(s) Oral every 4 hours PRN Moderate Pain (4 - 6)  oxyCODONE    IR 10 milliGRAM(s) Oral every 4 hours PRN Severe Pain (7 - 10)      ASSESSMENT AND PLAN:  57f, PMH as above a/w    1. Rectal ca:  -s/p LAR/Ileostomy POD#1-  -liquid diet   -pain control  -ambulate.   -monitor kirkland outpt.   -incentive spirometry.     2. GERD:  -PPI    3. DVT px:  -lovenox    thanks, will follow

## 2023-10-13 NOTE — ADVANCED PRACTICE NURSE CONSULT - ASSESSMENT
Daughter present at bedside. Refused for  use. Daughter speaks english very well and will be helping her mom with care.      Assessment:    Received patient sitting up in bed, awake, made aware of purpose of CWON, agreeable to ostomy teaching. Loop Ileostomy  w/ Rudy drainable pouching system applied  in place, barrier intact, no leakage. NP seal in place      Type of ostomy: Loop Ileostomy   Location: RLQ  Chris: In place and Over the wafer   Size: "  Mucosa:   Peristomal skin:   Mucocutaneous junction:  Abdominal contours: Rounded   Output: ~150c dark brown liquid effluent present in removed pouch  Midline/lap sites/ drains: NP Seal in place      Full ostomy lesson provided to patient. and daughter   Impression:    Patient and daughter ready & willing to learn ostomy care this visit,  looked directly at stoma, asking  appropriate questions & able to verbalize key ostomy points. Patient again observed pouch opening/closing, again reviewed w/ patient that her stool consistency & frequency of Loop Ileostomy  will require her to empty pouch several times each day & again strongly encouraged patient start practicing pouch opening/closing & emptying as this skill is required prior to d/c home, patient agreeable to start practicing pouch emptying. Patient expressed feeling overwhelmed with everything,  much emotional support & encouragement provided to patient.     Reviewed with patient dietary restrictions, s/s & prevention of food obstruction & constipation. Also reviewed w/ patient lifestyle modifications (clothing, bathing/showering, physical activity). Written information regarding all above topics provided to patient.        Will do full assessment if patient is still in the hospital on monday when chris is removed.

## 2023-10-13 NOTE — PROGRESS NOTE ADULT - ASSESSMENT
58 yo F POD 1 s/p low anterior resection with diverting loop ileostomy for rectal cancer. She is stable with awaiting progression per ERAS pathway.    -Likely to advance to low residue diet  -HL IV w/ adequate PO  -Multimodal pain control  -Incentive spirometry  -Periop abx complete  -VTE ppx w/ SCDs & Lovenox  -Ostomy teaching  -Dispo planning    Discussed with Colorectal Surgery attending. 58 yo F POD 1 s/p low anterior resection with diverting loop ileostomy for rectal cancer. She is stable with awaiting progression per ERAS pathway.    -Likely to advance to low residue diet  -HL IV w/ adequate PO  -Multimodal pain control  -Incentive spirometry  -Periop abx complete  -Day to remain for a few days post op  -VTE ppx w/ SCDs & Lovenox  -Ostomy teaching  -Dispo planning    Discussed with Colorectal Surgery attending.

## 2023-10-13 NOTE — ADVANCED PRACTICE NURSE CONSULT - RECOMMEDATIONS
Pouch change: 	  -Gently remove pouch water moistened gauze   -Cleanse stoma site with water moistened gauze, gently pat dry  -Measure stoma, currently   -Trace and cut current size on [place] 2 1/4” CeraPlus flat barrier  -Apply barrier to patient's skin  -Attach [place] 2 1/4” drainable lock and roll pouch onto barrier  Empty pouch when 1/3 to no more than 1/2 full of effluent/flatus. Change pouching system q 3 - 4 days and prn for leaking. Next pouch change-

## 2023-10-14 LAB
ANION GAP SERPL CALC-SCNC: 4 MMOL/L — LOW (ref 5–17)
BUN SERPL-MCNC: 5 MG/DL — LOW (ref 7–23)
CALCIUM SERPL-MCNC: 8.7 MG/DL — SIGNIFICANT CHANGE UP (ref 8.5–10.1)
CHLORIDE SERPL-SCNC: 110 MMOL/L — HIGH (ref 96–108)
CO2 SERPL-SCNC: 27 MMOL/L — SIGNIFICANT CHANGE UP (ref 22–31)
CREAT SERPL-MCNC: 0.63 MG/DL — SIGNIFICANT CHANGE UP (ref 0.5–1.3)
EGFR: 104 ML/MIN/1.73M2 — SIGNIFICANT CHANGE UP
GLUCOSE BLDC GLUCOMTR-MCNC: 110 MG/DL — HIGH (ref 70–99)
GLUCOSE SERPL-MCNC: 103 MG/DL — HIGH (ref 70–99)
HCT VFR BLD CALC: 34 % — LOW (ref 34.5–45)
HGB BLD-MCNC: 11.3 G/DL — LOW (ref 11.5–15.5)
MAGNESIUM SERPL-MCNC: 2 MG/DL — SIGNIFICANT CHANGE UP (ref 1.6–2.6)
MCHC RBC-ENTMCNC: 29.4 PG — SIGNIFICANT CHANGE UP (ref 27–34)
MCHC RBC-ENTMCNC: 33.2 GM/DL — SIGNIFICANT CHANGE UP (ref 32–36)
MCV RBC AUTO: 88.5 FL — SIGNIFICANT CHANGE UP (ref 80–100)
PHOSPHATE SERPL-MCNC: 2.5 MG/DL — SIGNIFICANT CHANGE UP (ref 2.5–4.5)
PLATELET # BLD AUTO: 228 K/UL — SIGNIFICANT CHANGE UP (ref 150–400)
POTASSIUM SERPL-MCNC: 3.7 MMOL/L — SIGNIFICANT CHANGE UP (ref 3.5–5.3)
POTASSIUM SERPL-SCNC: 3.7 MMOL/L — SIGNIFICANT CHANGE UP (ref 3.5–5.3)
RBC # BLD: 3.84 M/UL — SIGNIFICANT CHANGE UP (ref 3.8–5.2)
RBC # FLD: 13 % — SIGNIFICANT CHANGE UP (ref 10.3–14.5)
SODIUM SERPL-SCNC: 141 MMOL/L — SIGNIFICANT CHANGE UP (ref 135–145)
WBC # BLD: 6.29 K/UL — SIGNIFICANT CHANGE UP (ref 3.8–10.5)
WBC # FLD AUTO: 6.29 K/UL — SIGNIFICANT CHANGE UP (ref 3.8–10.5)

## 2023-10-14 PROCEDURE — 99232 SBSQ HOSP IP/OBS MODERATE 35: CPT

## 2023-10-14 RX ORDER — SODIUM,POTASSIUM PHOSPHATES 278-250MG
1 POWDER IN PACKET (EA) ORAL ONCE
Refills: 0 | Status: COMPLETED | OUTPATIENT
Start: 2023-10-14 | End: 2023-10-14

## 2023-10-14 RX ADMIN — OXYCODONE HYDROCHLORIDE 5 MILLIGRAM(S): 5 TABLET ORAL at 17:05

## 2023-10-14 RX ADMIN — OXYCODONE HYDROCHLORIDE 5 MILLIGRAM(S): 5 TABLET ORAL at 01:57

## 2023-10-14 RX ADMIN — OXYCODONE HYDROCHLORIDE 5 MILLIGRAM(S): 5 TABLET ORAL at 18:00

## 2023-10-14 RX ADMIN — SODIUM CHLORIDE 3 MILLILITER(S): 9 INJECTION INTRAMUSCULAR; INTRAVENOUS; SUBCUTANEOUS at 14:01

## 2023-10-14 RX ADMIN — ENOXAPARIN SODIUM 40 MILLIGRAM(S): 100 INJECTION SUBCUTANEOUS at 06:31

## 2023-10-14 RX ADMIN — Medication 1 PACKET(S): at 14:21

## 2023-10-14 RX ADMIN — ALVIMOPAN 12 MILLIGRAM(S): 12 CAPSULE ORAL at 09:37

## 2023-10-14 RX ADMIN — SODIUM CHLORIDE 3 MILLILITER(S): 9 INJECTION INTRAMUSCULAR; INTRAVENOUS; SUBCUTANEOUS at 21:42

## 2023-10-14 RX ADMIN — OXYCODONE HYDROCHLORIDE 5 MILLIGRAM(S): 5 TABLET ORAL at 01:27

## 2023-10-14 RX ADMIN — PANTOPRAZOLE SODIUM 40 MILLIGRAM(S): 20 TABLET, DELAYED RELEASE ORAL at 09:37

## 2023-10-14 RX ADMIN — ALVIMOPAN 12 MILLIGRAM(S): 12 CAPSULE ORAL at 21:31

## 2023-10-14 RX ADMIN — SODIUM CHLORIDE 3 MILLILITER(S): 9 INJECTION INTRAMUSCULAR; INTRAVENOUS; SUBCUTANEOUS at 06:31

## 2023-10-14 NOTE — DIETITIAN INITIAL EVALUATION ADULT - PERTINENT LABORATORY DATA
10-14    141  |  110<H>  |  5<L>  ----------------------------<  103<H>  3.7   |  27  |  0.63    Ca    8.7      14 Oct 2023 07:52  Phos  2.5     10-14  Mg     2.0     10-14    POCT Blood Glucose.: 110 mg/dL (10-14-23 @ 05:38)  A1C with Estimated Average Glucose Result: 5.8 % (10-04-23 @ 09:47)

## 2023-10-14 NOTE — DIETITIAN INITIAL EVALUATION ADULT - PERSON TAUGHT/METHOD
provided verbal and written education in [place]; pt illiterate, left handout at bedside for dtr/verbal instruction/written material/teach back - (Patient repeats in own words)/patient instructed

## 2023-10-14 NOTE — DIETITIAN INITIAL EVALUATION ADULT - OTHER INFO
56 y/o F with a PMHx of Prediabetes, GERD, Rectosigmoid ca who is admitted for elective LAR/Ileostomy. S/p laparoscopic low anterior resection, ileostomy, rigid proctosigmoidoscopy, and abdominal lymph node dissection (10/12).     #[id number]. Pt's diet advanced to Low Fiber last night, pt states that she is tolerating diet with a little bit of pain at surgical site. States that her UBW was 115-120# 3 mon ago, but has been losing weight and now weighs ~105#. Weight taken by RD on 10/14 - 123#; ?accuracy of weight loss. NFPE reveals moderate muscle wasting, pt meets criteria for PCM at this time. C/w Low Fiber diet. Encouraged high kcal/ high protein intake, pt receptive to trialing Ensure Plus High Protein (van) BID in effort to optimize PO intake. Pt previously receiving Ensure on FLD tray and liked the vanilla flavor the best. Provided written and verbal diet education in [place], ?compliance; however left handout for dtr at bedside and pt and dtr educated by Ostomy RN yesterday. Please see additional recommendations below.

## 2023-10-14 NOTE — DIETITIAN INITIAL EVALUATION ADULT - PERTINENT MEDS FT
MEDICATIONS  (STANDING):  alvimopan 12 milliGRAM(s) Oral every 12 hours  enoxaparin Injectable 40 milliGRAM(s) SubCutaneous every 24 hours  influenza   Vaccine 0.5 milliLiter(s) IntraMuscular once  lactated ringers. 1000 milliLiter(s) (75 mL/Hr) IV Continuous <Continuous>  pantoprazole    Tablet 40 milliGRAM(s) Oral daily  sodium chloride 0.9% lock flush 3 milliLiter(s) IV Push every 8 hours    MEDICATIONS  (PRN):  ondansetron Injectable 4 milliGRAM(s) IV Push every 6 hours PRN Nausea and/or Vomiting  oxyCODONE    IR 5 milliGRAM(s) Oral every 4 hours PRN Moderate Pain (4 - 6)  oxyCODONE    IR 10 milliGRAM(s) Oral every 4 hours PRN Severe Pain (7 - 10)    Home Medications:  omeprazole 40 mg oral delayed release capsule: 1 cap(s) orally once a day (12 Oct 2023 10:23)

## 2023-10-14 NOTE — PROGRESS NOTE ADULT - ASSESSMENT
56 yo F POD 2 s/p low anterior resection with diverting loop ileostomy for rectal cancer.   She is stable with awaiting progression per ERAS pathway.    Plan:  -pain control prn  -monitor VS  -diet: continue LRD  -likely [place] removal today and TOV  -VTE ppx w/ SCDs & Lovenox  -Ostomy teaching  -encourage IS/OOB/ambulation  -Dispo planning    Discussed with Colorectal Surgery attending.

## 2023-10-14 NOTE — DIETITIAN INITIAL EVALUATION ADULT - ORAL INTAKE PTA/DIET HISTORY
#[id number]. Reports a okay appetite/ intake pta. Normally consumes 3 meals/ day, however her intake has decreased over the last 3 mon after her Ca dx. Consuming <75% of ENN x3 mon. Pt does not follow any diet restrictions or use any ONS. Pt does the cooking/ shopping at home.

## 2023-10-14 NOTE — DIETITIAN INITIAL EVALUATION ADULT - NSFNSGIIOFT_GEN_A_CORE
10-13-23 @ 07:01  -  10-14-23 @ 07:00  --------------------------------------------------------  OUT:    Ileostomy (mL): 1350 mL  Total OUT: 1350 mL    Total NET: -1350 mL

## 2023-10-14 NOTE — DIETITIAN INITIAL EVALUATION ADULT - ADD RECOMMEND
1) C/w Low Fiber diet  2) Add ensure plus high protein BID to optimize PO intake (provides 350 kcal, 20g protein/ shake)   3) Obtain vitamin D 25OH level to assess nutriture  4) Please obtain weekly weights  5) Consider adding thiamine 100 mg daily 2/2 poor PO intake/ malnutrition  6) MVI w/ minerals daily to ensure 100% RDA met  7) Encourage protein-rich foods, maximize food preferences  8) Monitor bowel movements, if no BM for >3 days, consider implementing bowel regimen.  9) Confirm goals of care regarding nutrition support  RD will continue to monitor PO intake, labs, hydration, and wt prn.

## 2023-10-15 LAB
ANION GAP SERPL CALC-SCNC: 5 MMOL/L — SIGNIFICANT CHANGE UP (ref 5–17)
BUN SERPL-MCNC: 7 MG/DL — SIGNIFICANT CHANGE UP (ref 7–23)
CALCIUM SERPL-MCNC: 9.3 MG/DL — SIGNIFICANT CHANGE UP (ref 8.5–10.1)
CHLORIDE SERPL-SCNC: 110 MMOL/L — HIGH (ref 96–108)
CO2 SERPL-SCNC: 24 MMOL/L — SIGNIFICANT CHANGE UP (ref 22–31)
CREAT SERPL-MCNC: 0.55 MG/DL — SIGNIFICANT CHANGE UP (ref 0.5–1.3)
EGFR: 108 ML/MIN/1.73M2 — SIGNIFICANT CHANGE UP
GLUCOSE SERPL-MCNC: 130 MG/DL — HIGH (ref 70–99)
HCT VFR BLD CALC: 37.2 % — SIGNIFICANT CHANGE UP (ref 34.5–45)
HGB BLD-MCNC: 12.4 G/DL — SIGNIFICANT CHANGE UP (ref 11.5–15.5)
MAGNESIUM SERPL-MCNC: 2.3 MG/DL — SIGNIFICANT CHANGE UP (ref 1.6–2.6)
MCHC RBC-ENTMCNC: 29.5 PG — SIGNIFICANT CHANGE UP (ref 27–34)
MCHC RBC-ENTMCNC: 33.3 GM/DL — SIGNIFICANT CHANGE UP (ref 32–36)
MCV RBC AUTO: 88.6 FL — SIGNIFICANT CHANGE UP (ref 80–100)
PHOSPHATE SERPL-MCNC: 3.1 MG/DL — SIGNIFICANT CHANGE UP (ref 2.5–4.5)
PLATELET # BLD AUTO: 272 K/UL — SIGNIFICANT CHANGE UP (ref 150–400)
POTASSIUM SERPL-MCNC: 4.1 MMOL/L — SIGNIFICANT CHANGE UP (ref 3.5–5.3)
POTASSIUM SERPL-SCNC: 4.1 MMOL/L — SIGNIFICANT CHANGE UP (ref 3.5–5.3)
RBC # BLD: 4.2 M/UL — SIGNIFICANT CHANGE UP (ref 3.8–5.2)
RBC # FLD: 12.8 % — SIGNIFICANT CHANGE UP (ref 10.3–14.5)
SODIUM SERPL-SCNC: 139 MMOL/L — SIGNIFICANT CHANGE UP (ref 135–145)
WBC # BLD: 6.97 K/UL — SIGNIFICANT CHANGE UP (ref 3.8–10.5)
WBC # FLD AUTO: 6.97 K/UL — SIGNIFICANT CHANGE UP (ref 3.8–10.5)

## 2023-10-15 PROCEDURE — 99232 SBSQ HOSP IP/OBS MODERATE 35: CPT

## 2023-10-15 RX ADMIN — ENOXAPARIN SODIUM 40 MILLIGRAM(S): 100 INJECTION SUBCUTANEOUS at 05:28

## 2023-10-15 RX ADMIN — PANTOPRAZOLE SODIUM 40 MILLIGRAM(S): 20 TABLET, DELAYED RELEASE ORAL at 10:03

## 2023-10-15 RX ADMIN — ALVIMOPAN 12 MILLIGRAM(S): 12 CAPSULE ORAL at 10:02

## 2023-10-15 RX ADMIN — SODIUM CHLORIDE 3 MILLILITER(S): 9 INJECTION INTRAMUSCULAR; INTRAVENOUS; SUBCUTANEOUS at 14:20

## 2023-10-15 RX ADMIN — SODIUM CHLORIDE 3 MILLILITER(S): 9 INJECTION INTRAMUSCULAR; INTRAVENOUS; SUBCUTANEOUS at 05:31

## 2023-10-15 RX ADMIN — SODIUM CHLORIDE 3 MILLILITER(S): 9 INJECTION INTRAMUSCULAR; INTRAVENOUS; SUBCUTANEOUS at 22:00

## 2023-10-15 NOTE — PROGRESS NOTE ADULT - NUTRITIONAL ASSESSMENT
This patient has been assessed with a concern for Malnutrition and has been determined to have a diagnosis/diagnoses of Moderate protein-calorie malnutrition.    This patient is being managed with:   Diet Low Fiber-  Entered: Oct 13 2023  6:44PM

## 2023-10-15 NOTE — PROGRESS NOTE ADULT - ASSESSMENT
58 yo F POD 3 s/p low anterior resection with diverting loop ileostomy for rectal cancer.   She is stable with awaiting progression per ERAS pathway.    Plan:  -pain control prn  -monitor VS  -diet: continue LRD  -VTE ppx w/ SCDs & Lovenox  -Ostomy teaching  -encourage IS/OOB/ambulation  -Dispo planning    Discussed with Colorectal Surgery attending.

## 2023-10-15 NOTE — PROGRESS NOTE ADULT - ATTENDING COMMENTS
Patient seen and examined at bedside this morning  Patient is doing well, tolerating a diet, denies N/V, having appropriate stoma output  Abdomen is soft, AT, ND, ostomy pink and productive  A/P: Regular diet, continue IV fluids, discontinue Day, follow up stoma output, stoma education, IS use, OOB with frequent ambulation. CM for discharge needs planing    Vital Signs Last 24 Hrs  T(C): 37.2 (14 Oct 2023 07:58), Max: 37.2 (14 Oct 2023 07:58)  T(F): 99 (14 Oct 2023 07:58), Max: 99 (14 Oct 2023 07:58)  HR: 75 (14 Oct 2023 07:58) (69 - 75)  BP: 160/81 (14 Oct 2023 07:58) (118/64 - 160/81)  BP(mean): 94 (13 Oct 2023 20:00) (81 - 94)  RR: 18 (14 Oct 2023 07:58) (17 - 18)  SpO2: 97% (14 Oct 2023 07:58) (96% - 97%)    Parameters below as of 14 Oct 2023 07:58  Patient On (Oxygen Delivery Method): room air                          11.3   6.29  )-----------( 228      ( 14 Oct 2023 07:52 )             34.0
Patient seen and examined at bedside this morning  Patient is doing well, tolerating food, denies N/V, having appropriate stoma output  Abdomen is soft, AT, ND, ostomy pink and productive  A/P: Continue regular diet, discontinue IV fluids, follow up stoma output quality and quantity, stoma education, IS use, OOB with frequent ambulation. CM for discharge needs planing, plan for discharge within 24hrs    Vital Signs Last 24 Hrs  T(C): 37.2 (15 Oct 2023 07:59), Max: 37.2 (15 Oct 2023 07:59)  T(F): 98.9 (15 Oct 2023 07:59), Max: 98.9 (15 Oct 2023 07:59)  HR: 77 (15 Oct 2023 07:59) (73 - 86)  BP: 144/87 (15 Oct 2023 07:59) (110/82 - 144/87)  BP(mean): --  RR: 18 (15 Oct 2023 07:59) (18 - 18)  SpO2: 97% (15 Oct 2023 07:59) (95% - 100%)    Parameters below as of 15 Oct 2023 07:59  Patient On (Oxygen Delivery Method): room air                          12.4   6.97  )-----------( 272      ( 15 Oct 2023 08:08 )             37.2
Patient seen and examined this morning.  She is postop day 1 from a low anterior resection for rectal cancer with diverting loop ileostomy.  Stable overnight and doing well this morning.  Ileostomy is functioning and incision is covered with dressing.  Abdomen is soft and appropriately tender.  Will advance diet as tolerated, ileostomy teaching, keep Day till tomorrow.  Ambulation and I-S encouraged.

## 2023-10-16 ENCOUNTER — TRANSCRIPTION ENCOUNTER (OUTPATIENT)
Age: 58
End: 2023-10-16

## 2023-10-16 VITALS
TEMPERATURE: 98 F | SYSTOLIC BLOOD PRESSURE: 144 MMHG | RESPIRATION RATE: 16 BRPM | OXYGEN SATURATION: 96 % | HEART RATE: 85 BPM | DIASTOLIC BLOOD PRESSURE: 89 MMHG

## 2023-10-16 LAB
ANION GAP SERPL CALC-SCNC: 5 MMOL/L — SIGNIFICANT CHANGE UP (ref 5–17)
BUN SERPL-MCNC: 10 MG/DL — SIGNIFICANT CHANGE UP (ref 7–23)
CALCIUM SERPL-MCNC: 9.4 MG/DL — SIGNIFICANT CHANGE UP (ref 8.5–10.1)
CHLORIDE SERPL-SCNC: 105 MMOL/L — SIGNIFICANT CHANGE UP (ref 96–108)
CO2 SERPL-SCNC: 28 MMOL/L — SIGNIFICANT CHANGE UP (ref 22–31)
CREAT SERPL-MCNC: 0.61 MG/DL — SIGNIFICANT CHANGE UP (ref 0.5–1.3)
EGFR: 105 ML/MIN/1.73M2 — SIGNIFICANT CHANGE UP
GLUCOSE SERPL-MCNC: 128 MG/DL — HIGH (ref 70–99)
HCT VFR BLD CALC: 37.7 % — SIGNIFICANT CHANGE UP (ref 34.5–45)
HGB BLD-MCNC: 12.7 G/DL — SIGNIFICANT CHANGE UP (ref 11.5–15.5)
MAGNESIUM SERPL-MCNC: 2.3 MG/DL — SIGNIFICANT CHANGE UP (ref 1.6–2.6)
MCHC RBC-ENTMCNC: 29.5 PG — SIGNIFICANT CHANGE UP (ref 27–34)
MCHC RBC-ENTMCNC: 33.7 GM/DL — SIGNIFICANT CHANGE UP (ref 32–36)
MCV RBC AUTO: 87.7 FL — SIGNIFICANT CHANGE UP (ref 80–100)
PHOSPHATE SERPL-MCNC: 3.6 MG/DL — SIGNIFICANT CHANGE UP (ref 2.5–4.5)
PLATELET # BLD AUTO: 290 K/UL — SIGNIFICANT CHANGE UP (ref 150–400)
POTASSIUM SERPL-MCNC: 4.5 MMOL/L — SIGNIFICANT CHANGE UP (ref 3.5–5.3)
POTASSIUM SERPL-SCNC: 4.5 MMOL/L — SIGNIFICANT CHANGE UP (ref 3.5–5.3)
RBC # BLD: 4.3 M/UL — SIGNIFICANT CHANGE UP (ref 3.8–5.2)
RBC # FLD: 12.9 % — SIGNIFICANT CHANGE UP (ref 10.3–14.5)
SODIUM SERPL-SCNC: 138 MMOL/L — SIGNIFICANT CHANGE UP (ref 135–145)
WBC # BLD: 6.23 K/UL — SIGNIFICANT CHANGE UP (ref 3.8–10.5)
WBC # FLD AUTO: 6.23 K/UL — SIGNIFICANT CHANGE UP (ref 3.8–10.5)

## 2023-10-16 PROCEDURE — 99232 SBSQ HOSP IP/OBS MODERATE 35: CPT

## 2023-10-16 RX ORDER — OXYCODONE AND ACETAMINOPHEN 5; 325 MG/1; MG/1
1 TABLET ORAL
Qty: 20 | Refills: 0
Start: 2023-10-16

## 2023-10-16 RX ADMIN — ENOXAPARIN SODIUM 40 MILLIGRAM(S): 100 INJECTION SUBCUTANEOUS at 05:12

## 2023-10-16 RX ADMIN — SODIUM CHLORIDE 3 MILLILITER(S): 9 INJECTION INTRAMUSCULAR; INTRAVENOUS; SUBCUTANEOUS at 05:47

## 2023-10-16 RX ADMIN — PANTOPRAZOLE SODIUM 40 MILLIGRAM(S): 20 TABLET, DELAYED RELEASE ORAL at 10:37

## 2023-10-16 NOTE — DISCHARGE NOTE NURSING/CASE MANAGEMENT/SOCIAL WORK - NSDCPEFALRISK_GEN_ALL_CORE
For information on Fall & Injury Prevention, visit: [email or web address] OR  [email or web address] OR  https://www.cdc.gov/steadi/patient.html

## 2023-10-16 NOTE — DISCHARGE NOTE PROVIDER - NSDCACTIVITY_GEN_ALL_CORE
Showering allowed/Stairs allowed/Walking - Indoors allowed/No heavy lifting/straining/Walking - Outdoors allowed Showering allowed/Stairs allowed/Driving allowed/Walking - Indoors allowed/No heavy lifting/straining/Walking - Outdoors allowed/Follow Instructions Provided by your Surgical Team

## 2023-10-16 NOTE — PROGRESS NOTE ADULT - ASSESSMENT
56 yo F POD 4 s/p low anterior resection with diverting loop ileostomy for rectal cancer.   She is stable, tolerated diet, ostomy functional, ostomy teaching performed    Plan:    -pain control prn  -monitor VS  -diet: continue LRD  -VTE ppx w/ SCDs & Lovenox  -encourage IS/OOB/ambulation  -Dispo planning today    Discussed with Colorectal Surgery attending.

## 2023-10-16 NOTE — DISCHARGE NOTE PROVIDER - NSDCMRMEDTOKEN_GEN_ALL_CORE_FT
omeprazole 40 mg oral delayed release capsule: 1 cap(s) orally once a day   omeprazole 40 mg oral delayed release capsule: 1 cap(s) orally once a day  oxycodone-acetaminophen 5 mg-325 mg oral tablet: 1 tab(s) orally every 6 hours as needed for  moderate pain MDD: 004

## 2023-10-16 NOTE — PROGRESS NOTE ADULT - SUBJECTIVE AND OBJECTIVE BOX
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 Hospitalist service consulted for medical comanagement   PT seen and examined on 2N. Doing well. Minimal pain.tolerating liquids. ambulating. kirkland in place. ostomy with liquid .    10/14/23- up in a chair, states pain is moderate. [place] in.    ROS: all 10 systems reviewed and is as above otherwise negative.     PMH: as above  PSH: denies  F/H: denies significant medical history in immediate family members  Social: no tobacco, occasional etoh  Allergies: NKDA  Home meds: see med rec    Vital Signs Last 24 Hrs  T(C): 37.2 (14 Oct 2023 07:58), Max: 37.2 (14 Oct 2023 07:58)  T(F): 99 (14 Oct 2023 07:58), Max: 99 (14 Oct 2023 07:58)  HR: 75 (14 Oct 2023 07:58) (69 - 75)  BP: 160/81 (14 Oct 2023 07:58) (118/64 - 160/81)  BP(mean): 94 (13 Oct 2023 20:00) (81 - 94)  RR: 18 (14 Oct 2023 07:58) (17 - 18)  SpO2: 97% (14 Oct 2023 07:58) (96% - 97%)    Parameters below as of 14 Oct 2023 07:58  Patient On (Oxygen Delivery Method): room air    PHYSICAL EXAM:  GENERAL: Comfortable, no acute distress   HEAD:  Normocephalic, atraumatic  EYES: EOMI, PERRLA  HEENT: Moist mucous membranes  NECK: Supple, No JVD  NERVOUS SYSTEM:  Alert & Oriented X3, Motor Strength 5/5 B/L upper and lower extremities  CHEST/LUNG: Clear to auscultation bilaterally  HEART: Regular rate and rhythm  ABDOMEN: Soft,  appropriate post op tenderness, ostomy with brown liquid, BS+  GENITOURINARY: Voiding, no palpable bladder  EXTREMITIES:   No clubbing, cyanosis, or edema  MUSCULOSKELETAL- No muscle tenderness, no joint tenderness  SKIN-no rash    LABS:                                   11.3   6.29  )-----------( 228      ( 14 Oct 2023 07:52 )             34.0     14 Oct 2023 07:52    141    |  110    |  5      ----------------------------<  103    3.7     |  27     |  0.63     Ca    8.7        14 Oct 2023 07:52  Phos  2.5       14 Oct 2023 07:52  Mg     2.0       14 Oct 2023 07:52    CAPILLARY BLOOD GLUCOSE  POCT Blood Glucose.: 110 mg/dL (14 Oct 2023 05:38)  POCT Blood Glucose.: 119 mg/dL (13 Oct 2023 21:15)  POCT Blood Glucose.: 134 mg/dL (13 Oct 2023 16:41)    Urinalysis Basic - ( 14 Oct 2023 07:52 )  Color: x / Appearance: x / SG: x / pH: x  Gluc: 103 mg/dL / Ketone: x  / Bili: x / Urobili: x   Blood: x / Protein: x / Nitrite: x   Leuk Esterase: x / RBC: x / WBC x   Sq Epi: x / Non Sq Epi: x / Bacteria: x    MEDICATIONS  (STANDING):  acetaminophen   IVPB .. 1000 milliGRAM(s) IV Intermittent every 6 hours  alvimopan 12 milliGRAM(s) Oral every 12 hours  enoxaparin Injectable 40 milliGRAM(s) SubCutaneous every 24 hours  influenza   Vaccine 0.5 milliLiter(s) IntraMuscular once  ketorolac   Injectable 15 milliGRAM(s) IV Push every 6 hours  lactated ringers. 1000 milliLiter(s) (75 mL/Hr) IV Continuous <Continuous>  pantoprazole    Tablet 40 milliGRAM(s) Oral daily  sodium chloride 0.9% lock flush 3 milliLiter(s) IV Push every 8 hours    MEDICATIONS  (PRN):  ondansetron Injectable 4 milliGRAM(s) IV Push every 6 hours PRN Nausea and/or Vomiting  oxyCODONE    IR 5 milliGRAM(s) Oral every 4 hours PRN Moderate Pain (4 - 6)  oxyCODONE    IR 10 milliGRAM(s) Oral every 4 hours PRN Severe Pain (7 - 10)      ASSESSMENT AND PLAN:  57f, PMH as above a/w    1. Rectal ca:  -s/p LAR/Ileostomy POD#2  -crs team following  -liquid diet   -pain control  -ambulate.   -kirkland per crs team  -incentive spirometry.     2. GERD:  -PPI    3. DVT px:  -lovenox  
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 Hospitalist service consulted for medical comanagement   PT seen and examined on 2N. Doing well. Minimal pain.tolerating liquids. ambulating. kirkland in place. ostomy with liquid .    10/14/23- up in a chair, states pain is moderate. [place] in.  10/15/23- ambulating. [place] removed- voiding. Diet being advanced     ROS: all 10 systems reviewed and is as above otherwise negative.     PMH: as above  PSH: denies  F/H: denies significant medical history in immediate family members  Social: no tobacco, occasional etoh  Allergies: NKDA  Home meds: see med rec    Vital Signs Last 24 Hrs  T(C): 37.2 (15 Oct 2023 07:59), Max: 37.2 (15 Oct 2023 07:59)  T(F): 98.9 (15 Oct 2023 07:59), Max: 98.9 (15 Oct 2023 07:59)  HR: 77 (15 Oct 2023 07:59) (77 - 86)  BP: 144/87 (15 Oct 2023 07:59) (125/75 - 144/87)  BP(mean): --  RR: 18 (15 Oct 2023 07:59) (18 - 18)  SpO2: 97% (15 Oct 2023 07:59) (95% - 97%)    Parameters below as of 15 Oct 2023 07:59  Patient On (Oxygen Delivery Method): room air    PHYSICAL EXAM:  GENERAL: Comfortable, no acute distress   HEAD:  Normocephalic, atraumatic  EYES: EOMI, PERRLA  HEENT: Moist mucous membranes  NECK: Supple, No JVD  NERVOUS SYSTEM:  Alert & Oriented X3, Motor Strength 5/5 B/L upper and lower extremities  CHEST/LUNG: Clear to auscultation bilaterally  HEART: Regular rate and rhythm  ABDOMEN: Soft,  appropriate post op tenderness, ostomy with brown liquid, BS+  GENITOURINARY: Voiding, no palpable bladder  EXTREMITIES:   No clubbing, cyanosis, or edema  MUSCULOSKELETAL- No muscle tenderness, no joint tenderness  SKIN-no rash    LABS:                               12.4   6.97  )-----------( 272      ( 15 Oct 2023 08:08 )             37.2     15 Oct 2023 08:08    139    |  110    |  7      ----------------------------<  130    4.1     |  24     |  0.55     Ca    9.3        15 Oct 2023 08:08  Phos  3.1       15 Oct 2023 08:08  Mg     2.3       15 Oct 2023 08:08    Urinalysis Basic - ( 15 Oct 2023 08:08 )  Color: x / Appearance: x / SG: x / pH: x  Gluc: 130 mg/dL / Ketone: x  / Bili: x / Urobili: x   Blood: x / Protein: x / Nitrite: x   Leuk Esterase: x / RBC: x / WBC x   Sq Epi: x / Non Sq Epi: x / Bacteria: x      MEDICATIONS  (STANDING):  acetaminophen   IVPB .. 1000 milliGRAM(s) IV Intermittent every 6 hours  alvimopan 12 milliGRAM(s) Oral every 12 hours  enoxaparin Injectable 40 milliGRAM(s) SubCutaneous every 24 hours  influenza   Vaccine 0.5 milliLiter(s) IntraMuscular once  ketorolac   Injectable 15 milliGRAM(s) IV Push every 6 hours  lactated ringers. 1000 milliLiter(s) (75 mL/Hr) IV Continuous <Continuous>  pantoprazole    Tablet 40 milliGRAM(s) Oral daily  sodium chloride 0.9% lock flush 3 milliLiter(s) IV Push every 8 hours    MEDICATIONS  (PRN):  ondansetron Injectable 4 milliGRAM(s) IV Push every 6 hours PRN Nausea and/or Vomiting  oxyCODONE    IR 5 milliGRAM(s) Oral every 4 hours PRN Moderate Pain (4 - 6)  oxyCODONE    IR 10 milliGRAM(s) Oral every 4 hours PRN Severe Pain (7 - 10)      ASSESSMENT AND PLAN:  57f, PMH as above a/w    1. Rectal ca:  -s/p LAR/Ileostomy POD#3  -crs team following  -diet being advanced  -pain control  -ambulate.   -kirkland removed- voiding  -incentive spirometry.     2. GERD:  -PPI    3. DVT px:  -lovenox    #Dispo- likely home tomorrow. Per surg team
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 Hospitalist service consulted for medical comanagement   PT seen and examined on 2N. Doing well. Minimal pain.tolerating liquids. ambulating. kirkland in place. ostomy with liquid .    10/14/23- up in a chair, states pain is moderate. [place] in.  10/15/23- ambulating. Kirkland removed- voiding. Diet being advanced   10/16/23- diet advanced, tolerating well. +ostomy output. Ambulating. Wants to go home    ROS: all 10 systems reviewed and is as above otherwise negative.     PMH: as above  PSH: denies  F/H: denies significant medical history in immediate family members  Social: no tobacco, occasional etoh  Allergies: NKDA  Home meds: see med rec    Vital Signs Last 24 Hrs  T(C): 36.8 (16 Oct 2023 08:25), Max: 37.4 (16 Oct 2023 00:00)  T(F): 98.2 (16 Oct 2023 08:25), Max: 99.3 (16 Oct 2023 00:00)  HR: 85 (16 Oct 2023 08:25) (85 - 93)  BP: 144/89 (16 Oct 2023 08:25) (139/80 - 144/89)  BP(mean): --  RR: 16 (16 Oct 2023 08:25) (16 - 18)  SpO2: 96% (16 Oct 2023 08:25) (96% - 97%)    Parameters below as of 16 Oct 2023 08:25  Patient On (Oxygen Delivery Method): room air    PHYSICAL EXAM:  GENERAL: Comfortable, no acute distress   HEAD:  Normocephalic, atraumatic  EYES: EOMI, PERRLA  HEENT: Moist mucous membranes  NECK: Supple, No JVD  NERVOUS SYSTEM:  Alert & Oriented X3, Motor Strength 5/5 B/L upper and lower extremities  CHEST/LUNG: Clear to auscultation bilaterally  HEART: Regular rate and rhythm  ABDOMEN: Soft,  appropriate post op tenderness, ostomy with brown liquid, BS+  GENITOURINARY: Voiding, no palpable bladder  EXTREMITIES:   No clubbing, cyanosis, or edema  MUSCULOSKELETAL- No muscle tenderness, no joint tenderness  SKIN-no rash    LABS:                                       12.7   6.23  )-----------( 290      ( 16 Oct 2023 07:17 )             37.7     16 Oct 2023 07:17    138    |  105    |  10     ----------------------------<  128    4.5     |  28     |  0.61     Ca    9.4        16 Oct 2023 07:17  Phos  3.6       16 Oct 2023 07:17  Mg     2.3       16 Oct 2023 07:17    Urinalysis Basic - ( 16 Oct 2023 07:17 )  Color: x / Appearance: x / SG: x / pH: x  Gluc: 128 mg/dL / Ketone: x  / Bili: x / Urobili: x   Blood: x / Protein: x / Nitrite: x   Leuk Esterase: x / RBC: x / WBC x   Sq Epi: x / Non Sq Epi: x / Bacteria: x    MEDICATIONS  (STANDING):  acetaminophen   IVPB .. 1000 milliGRAM(s) IV Intermittent every 6 hours  alvimopan 12 milliGRAM(s) Oral every 12 hours  enoxaparin Injectable 40 milliGRAM(s) SubCutaneous every 24 hours  influenza   Vaccine 0.5 milliLiter(s) IntraMuscular once  ketorolac   Injectable 15 milliGRAM(s) IV Push every 6 hours  lactated ringers. 1000 milliLiter(s) (75 mL/Hr) IV Continuous <Continuous>  pantoprazole    Tablet 40 milliGRAM(s) Oral daily  sodium chloride 0.9% lock flush 3 milliLiter(s) IV Push every 8 hours    MEDICATIONS  (PRN):  ondansetron Injectable 4 milliGRAM(s) IV Push every 6 hours PRN Nausea and/or Vomiting  oxyCODONE    IR 5 milliGRAM(s) Oral every 4 hours PRN Moderate Pain (4 - 6)  oxyCODONE    IR 10 milliGRAM(s) Oral every 4 hours PRN Severe Pain (7 - 10)      ASSESSMENT AND PLAN:  57f, PMH as above a/w    1. Rectal ca:  -s/p LAR/Ileostomy POD#4  -crs team following  -diet advanced, tolerated well  -pain control  -ambulate.   -kirkland removed- voiding  -incentive spirometry.     2. GERD:  -PPI    3. DVT px:  -lovenox    #Dispo- likely home today.  Per surg team
Pt seen and examined at bedside, tolerating LRD and voided after removing the Day. Continues to have bowel function; 200cc. Denied fever, chills, nausea, vomiting or SOB overnight.     Vital Signs Last 24 Hrs  T(C): 36.8 (15 Oct 2023 00:00), Max: 37.2 (14 Oct 2023 07:58)  T(F): 98.2 (15 Oct 2023 00:00), Max: 99 (14 Oct 2023 07:58)  HR: 86 (15 Oct 2023 00:00) (73 - 86)  BP: 125/75 (15 Oct 2023 00:00) (110/82 - 160/81)  BP(mean): --  RR: 18 (15 Oct 2023 00:00) (18 - 18)  SpO2: 95% (15 Oct 2023 00:00) (95% - 100%)    Parameters below as of 15 Oct 2023 00:00  Patient On (Oxygen Delivery Method): room air                            11.3   6.29  )-----------( 228      ( 14 Oct 2023 07:52 )             34.0     10-14    141  |  110<H>  |  5<L>  ----------------------------<  103<H>  3.7   |  27  |  0.63    Ca    8.7      14 Oct 2023 07:52  Phos  2.5     10-14  Mg     2.0     10-14      I&O's Summary    13 Oct 2023 07:01  -  14 Oct 2023 07:00  --------------------------------------------------------  IN: 0 mL / OUT: 4350 mL / NET: -4350 mL    14 Oct 2023 07:01  -  15 Oct 2023 02:30  --------------------------------------------------------  IN: 0 mL / OUT: 1350 mL / NET: -1350 mL          Physical Exam:  Pt is AAOx3  General: Well developed, in no acute distress.   Chest: Lungs clear, no rales, no rhonchi, no wheezes.   Heart: RR, no murmurs, no rubs, no gallops.   Abdomen: Soft, no tenderness, nondistended, no masses, BS normal. Ostomy with loose liquid stool.   Back: Normal curvature, no tenderness.   Neuro: Physiological, no localizing findings.   Skin: Normal, no rashes, no lesions noted.   Extremities: Warm, well perfused, no edema, Pulses intact
Pt seen at bedside, resting comfortable, tolerated regular diet, ostomy functional, pt states she received ostomy teaching and feels comfortable taking care of ostomy  Pt denies pain, nausea, vomiting, fever, chills    Vitals:  T(C): 37.4 (10-16 @ 00:00), Max: 37.4 (10-16 @ 00:00)  HR: 93 (10-16 @ 00:00) (77 - 93)  BP: 139/80 (10-16 @ 00:00) (139/80 - 144/87)  RR: 18 (10-16 @ 00:00) (18 - 18)  SpO2: 97% (10-16 @ 00:00) (97% - 97%)    10-14 @ 07:01  -  10-15 @ 07:00  --------------------------------------------------------  IN:  Total IN: 0 mL    OUT:    Ileostomy (mL): 600 mL    Indwelling Catheter - Urethral (mL): 900 mL    Voided (mL): 150 mL  Total OUT: 1650 mL    Total NET: -1650 mL      10-15 @ 07:01  -  10-16 @ 01:51  --------------------------------------------------------  IN:  Total IN: 0 mL    OUT:    Ileostomy (mL): 500 mL  Total OUT: 500 mL    Total NET: -500 mL          Physical Exam:  General: AAOx3, Well developed, NAD  Chest: Normal respiratory effort  Heart: RRR  Abdomen: Soft, NTND, no masses, ostomy patent, pink, with stool and gas in the bag  Neuro/Psych: No localized deficits. Normal spech, normal tone  Skin: Normal, no rashes, no lesions noted.   Extremities: Warm, well perfused, no edema, Pulses intact    10-15 @ 08:08                    12.4  CBC: 6.97>)-------(<272                     37.2                 139 | 110 | 7    CMP:  ----------------------< 130               4.1 | 24 | 0.55                      Ca:9.3  Phos:3.1  M.3               -|      |-        LFTs:  ------|-|-----             -|      |-      Current Inpatient Medications:  enoxaparin Injectable 40 milliGRAM(s) SubCutaneous every 24 hours  influenza   Vaccine 0.5 milliLiter(s) IntraMuscular once  lactated ringers. 1000 milliLiter(s) (75 mL/Hr) IV Continuous <Continuous>  ondansetron Injectable 4 milliGRAM(s) IV Push every 6 hours PRN  oxyCODONE    IR 5 milliGRAM(s) Oral every 4 hours PRN  oxyCODONE    IR 10 milliGRAM(s) Oral every 4 hours PRN  pantoprazole    Tablet 40 milliGRAM(s) Oral daily  sodium chloride 0.9% lock flush 3 milliLiter(s) IV Push every 8 hours      
SURGERY DAILY PROGRESS NOTE:     Subjective:  Patient seen and examined this AM at bedside. No acute events overnight and patient resting comfortably. Pain controlled. Tolerating diet. Denies fever/chills, shortness of breath, chest pain, nausea. VS reviewed    Objective:    MEDICATIONS  (STANDING):  acetaminophen   IVPB .. 1000 milliGRAM(s) IV Intermittent every 6 hours  alvimopan 12 milliGRAM(s) Oral every 12 hours  enoxaparin Injectable 40 milliGRAM(s) SubCutaneous every 24 hours  influenza   Vaccine 0.5 milliLiter(s) IntraMuscular once  ketorolac   Injectable 15 milliGRAM(s) IV Push every 6 hours  lactated ringers. 1000 milliLiter(s) (125 mL/Hr) IV Continuous <Continuous>  pantoprazole    Tablet 40 milliGRAM(s) Oral daily  sodium chloride 0.9% lock flush 3 milliLiter(s) IV Push every 8 hours    MEDICATIONS  (PRN):  ondansetron Injectable 4 milliGRAM(s) IV Push every 6 hours PRN Nausea and/or Vomiting  oxyCODONE    IR 10 milliGRAM(s) Oral every 4 hours PRN Severe Pain (7 - 10)  oxyCODONE    IR 5 milliGRAM(s) Oral every 4 hours PRN Moderate Pain (4 - 6)      Vital Signs Last 24 Hrs  T(C): 37.4 (13 Oct 2023 04:00), Max: 37.5 (13 Oct 2023 00:00)  T(F): 99.3 (13 Oct 2023 04:00), Max: 99.5 (13 Oct 2023 00:00)  HR: 60 (13 Oct 2023 04:00) (60 - 78)  BP: 116/82 (13 Oct 2023 04:00) (100/58 - 146/91)  BP(mean): 76 (12 Oct 2023 18:05) (76 - 76)  RR: 18 (13 Oct 2023 04:00) (12 - 18)  SpO2: 95% (13 Oct 2023 04:00) (95% - 100%)    Parameters below as of 13 Oct 2023 04:00  Patient On (Oxygen Delivery Method): room air          PHYSICAL EXAM   GENERAL: NAD, well developed, well nourished  HEAD: Atraumatic, normocephalic  EYES: EOMI, PERRLA, conjunctiva and sclera clear  ENT: moist mucous membrane  NECK: supple, No JVD, midline trachea  CHEST/LUNG: No increased WOB, symmetric excursions  Heart: RRR ppp, no peripheral edema  ABDOMEN: Round, soft, nondistended, appropriately TTP around incisions that are c/d/i. no organomegaly. ileostomy stoma pink and healthy, w/ succus and gas in the bag  EXTREMITIES: brisk cap refill. no clubbing, cyanosis or edema  : Day w/ clear yellow output in tubing  NERVOUS SYSTEM: AOx4, speech clear, no neuro-deficits  MSK: full ROM, no deformities  SKIN: warm to touch, no rash or lesions      I&O's Detail    12 Oct 2023 07:01  -  13 Oct 2023 07:00  --------------------------------------------------------  IN:    Other (mL): 2000 mL  Total IN: 2000 mL    OUT:    Blood Loss (mL): 100 mL    Ileostomy (mL): 400 mL    Indwelling Catheter - Urethral (mL): 725 mL  Total OUT: 1225 mL    Total NET: 775 mL          Daily Height in cm: 157.48 (12 Oct 2023 10:49)    Daily     LABS:  pending  
SURGERY DAILY PROGRESS NOTE:     Subjective:  Patient seen and examined this AM at bedside. No acute events overnight and patient resting comfortably. Tolerating diet, ostomy functioning. Denies fever/chills, shortness of breath, chest pain. VS reviewed    Objective:    MEDICATIONS  (STANDING):  alvimopan 12 milliGRAM(s) Oral every 12 hours  enoxaparin Injectable 40 milliGRAM(s) SubCutaneous every 24 hours  influenza   Vaccine 0.5 milliLiter(s) IntraMuscular once  lactated ringers. 1000 milliLiter(s) (75 mL/Hr) IV Continuous <Continuous>  pantoprazole    Tablet 40 milliGRAM(s) Oral daily  sodium chloride 0.9% lock flush 3 milliLiter(s) IV Push every 8 hours    MEDICATIONS  (PRN):  ondansetron Injectable 4 milliGRAM(s) IV Push every 6 hours PRN Nausea and/or Vomiting  oxyCODONE    IR 10 milliGRAM(s) Oral every 4 hours PRN Severe Pain (7 - 10)  oxyCODONE    IR 5 milliGRAM(s) Oral every 4 hours PRN Moderate Pain (4 - 6)      Vital Signs Last 24 Hrs  T(C): 37.2 (14 Oct 2023 07:58), Max: 37.2 (14 Oct 2023 07:58)  T(F): 99 (14 Oct 2023 07:58), Max: 99 (14 Oct 2023 07:58)  HR: 75 (14 Oct 2023 07:58) (69 - 75)  BP: 160/81 (14 Oct 2023 07:58) (108/72 - 160/81)  BP(mean): 94 (13 Oct 2023 20:00) (81 - 94)  RR: 18 (14 Oct 2023 07:58) (17 - 18)  SpO2: 97% (14 Oct 2023 07:58) (95% - 97%)    Parameters below as of 14 Oct 2023 07:58  Patient On (Oxygen Delivery Method): room air          PHYSICAL EXAM   GENERAL: NAD, well developed, well nourished  HEAD: Atraumatic, normocephalic  EYES: EOMI, PERRLA, conjunctiva and sclera clear  ENT: moist mucous membrane  NECK: supple, No JVD, midline trachea  CHEST/LUNG: No increased WOB, symmetric excursions  Heart: RRR ppp, no peripheral edema  ABDOMEN: Round, soft, nondistended, appropriately TTP around incisions that are c/d/i. ileostomy stoma pink and healthy, w/ succus and gas in the bag  EXTREMITIES: brisk cap refill. no clubbing, cyanosis or edema  NERVOUS SYSTEM: AOx4, speech clear, no neuro-deficits  MSK: full ROM, no deformities  SKIN: warm to touch, no rash or lesions      I&O's Detail    13 Oct 2023 07:01  -  14 Oct 2023 07:00  --------------------------------------------------------  IN:  Total IN: 0 mL    OUT:    Ileostomy (mL): 1350 mL    Indwelling Catheter - Urethral (mL): 3000 mL  Total OUT: 4350 mL    Total NET: -4350 mL          Daily     Daily     LABS:                        11.3   6.29  )-----------( 228      ( 14 Oct 2023 07:52 )             34.0     10-14    141  |  110<H>  |  5<L>  ----------------------------<  103<H>  3.7   |  27  |  0.63    Ca    8.7      14 Oct 2023 07:52  Phos  2.5     10-14  Mg     2.0     10-14        Urinalysis Basic - ( 14 Oct 2023 07:52 )    Color: x / Appearance: x / SG: x / pH: x  Gluc: 103 mg/dL / Ketone: x  / Bili: x / Urobili: x   Blood: x / Protein: x / Nitrite: x   Leuk Esterase: x / RBC: x / WBC x   Sq Epi: x / Non Sq Epi: x / Bacteria: x        RADIOLOGY & ADDITIONAL STUDIES:

## 2023-10-16 NOTE — DISCHARGE NOTE PROVIDER - HOSPITAL COURSE
58yo F presents w/ rectal cancer s/p LAR and DLI. Patient has been tolerating diet, labs unremarkable, ostomy functioning appropriately. vitals stable. stable for discharge

## 2023-10-16 NOTE — DISCHARGE NOTE NURSING/CASE MANAGEMENT/SOCIAL WORK - PATIENT PORTAL LINK FT
You can access the FollowMyHealth Patient Portal offered by [facility] by registering at the following website: [email or web address]. By joining [facility]’s FollowMyHealth portal, you will also be able to view your health information using other applications (apps) compatible with our system.

## 2023-10-16 NOTE — PROGRESS NOTE ADULT - PROVIDER SPECIALTY LIST ADULT
Colorectal Surgery
Hospitalist

## 2023-10-16 NOTE — DISCHARGE NOTE PROVIDER - DETAILS OF MALNUTRITION DIAGNOSIS/DIAGNOSES
This patient has been assessed with a concern for Malnutrition and was treated during this hospitalization for the following Nutrition diagnosis/diagnoses:     -  10/14/2023: Moderate protein-calorie malnutrition

## 2023-10-16 NOTE — DISCHARGE NOTE PROVIDER - CARE PROVIDER_API CALL
Alphonso De León  Colon/Rectal Surgery  [address] [place] [address]  Phone: [phone]  Fax: [phone]  Follow Up Time: 2 weeks

## 2023-10-17 PROBLEM — H26.9 UNSPECIFIED CATARACT: Chronic | Status: ACTIVE | Noted: 2023-10-04

## 2023-10-17 PROBLEM — C19 MALIGNANT NEOPLASM OF RECTOSIGMOID JUNCTION: Chronic | Status: ACTIVE | Noted: 2023-10-04

## 2023-10-17 PROBLEM — N81.4 UTEROVAGINAL PROLAPSE, UNSPECIFIED: Chronic | Status: ACTIVE | Noted: 2023-10-04

## 2023-10-17 PROBLEM — R73.03 PREDIABETES: Chronic | Status: ACTIVE | Noted: 2023-10-04

## 2023-10-17 PROBLEM — R21 RASH AND OTHER NONSPECIFIC SKIN ERUPTION: Chronic | Status: ACTIVE | Noted: 2023-10-04

## 2023-10-17 PROBLEM — K62.5 HEMORRHAGE OF ANUS AND RECTUM: Chronic | Status: ACTIVE | Noted: 2023-10-04

## 2023-10-17 PROBLEM — K21.9 GASTRO-ESOPHAGEAL REFLUX DISEASE WITHOUT ESOPHAGITIS: Chronic | Status: ACTIVE | Noted: 2023-10-04

## 2023-10-19 ENCOUNTER — NON-APPOINTMENT (OUTPATIENT)
Age: 58
End: 2023-10-19

## 2023-10-19 LAB
SURGICAL PATHOLOGY STUDY: SIGNIFICANT CHANGE UP
SURGICAL PATHOLOGY STUDY: SIGNIFICANT CHANGE UP

## 2023-10-20 ENCOUNTER — APPOINTMENT (OUTPATIENT)
Dept: HEMATOLOGY ONCOLOGY | Facility: CLINIC | Age: 58
End: 2023-10-20
Payer: MEDICAID

## 2023-10-20 ENCOUNTER — RESULT REVIEW (OUTPATIENT)
Age: 58
End: 2023-10-20

## 2023-10-20 ENCOUNTER — OUTPATIENT (OUTPATIENT)
Dept: OUTPATIENT SERVICES | Facility: HOSPITAL | Age: 58
LOS: 1 days | End: 2023-10-20
Payer: COMMERCIAL

## 2023-10-20 ENCOUNTER — NON-APPOINTMENT (OUTPATIENT)
Age: 58
End: 2023-10-20

## 2023-10-20 VITALS
DIASTOLIC BLOOD PRESSURE: 87 MMHG | HEIGHT: 62 IN | SYSTOLIC BLOOD PRESSURE: 133 MMHG | TEMPERATURE: 98.7 F | OXYGEN SATURATION: 98 % | HEART RATE: 94 BPM | BODY MASS INDEX: 21.35 KG/M2 | WEIGHT: 116 LBS

## 2023-10-20 DIAGNOSIS — Z98.890 OTHER SPECIFIED POSTPROCEDURAL STATES: Chronic | ICD-10-CM

## 2023-10-20 DIAGNOSIS — C20 MALIGNANT NEOPLASM OF RECTUM: ICD-10-CM

## 2023-10-20 LAB
BASOPHILS # BLD AUTO: 0.03 K/UL — SIGNIFICANT CHANGE UP (ref 0–0.2)
BASOPHILS # BLD AUTO: 0.03 K/UL — SIGNIFICANT CHANGE UP (ref 0–0.2)
BASOPHILS NFR BLD AUTO: 0.5 % — SIGNIFICANT CHANGE UP (ref 0–2)
BASOPHILS NFR BLD AUTO: 0.5 % — SIGNIFICANT CHANGE UP (ref 0–2)
EOSINOPHIL # BLD AUTO: 0.31 K/UL — SIGNIFICANT CHANGE UP (ref 0–0.5)
EOSINOPHIL # BLD AUTO: 0.31 K/UL — SIGNIFICANT CHANGE UP (ref 0–0.5)
EOSINOPHIL NFR BLD AUTO: 4.9 % — SIGNIFICANT CHANGE UP (ref 0–6)
EOSINOPHIL NFR BLD AUTO: 4.9 % — SIGNIFICANT CHANGE UP (ref 0–6)
HCT VFR BLD CALC: 41 % — SIGNIFICANT CHANGE UP (ref 34.5–45)
HCT VFR BLD CALC: 41 % — SIGNIFICANT CHANGE UP (ref 34.5–45)
HGB BLD-MCNC: 13.8 G/DL — SIGNIFICANT CHANGE UP (ref 11.5–15.5)
HGB BLD-MCNC: 13.8 G/DL — SIGNIFICANT CHANGE UP (ref 11.5–15.5)
IMM GRANULOCYTES NFR BLD AUTO: 0.5 % — SIGNIFICANT CHANGE UP (ref 0–0.9)
IMM GRANULOCYTES NFR BLD AUTO: 0.5 % — SIGNIFICANT CHANGE UP (ref 0–0.9)
LYMPHOCYTES # BLD AUTO: 1.23 K/UL — SIGNIFICANT CHANGE UP (ref 1–3.3)
LYMPHOCYTES # BLD AUTO: 1.23 K/UL — SIGNIFICANT CHANGE UP (ref 1–3.3)
LYMPHOCYTES # BLD AUTO: 19.3 % — SIGNIFICANT CHANGE UP (ref 13–44)
LYMPHOCYTES # BLD AUTO: 19.3 % — SIGNIFICANT CHANGE UP (ref 13–44)
MCHC RBC-ENTMCNC: 29.2 PG — SIGNIFICANT CHANGE UP (ref 27–34)
MCHC RBC-ENTMCNC: 29.2 PG — SIGNIFICANT CHANGE UP (ref 27–34)
MCHC RBC-ENTMCNC: 33.7 GM/DL — SIGNIFICANT CHANGE UP (ref 32–36)
MCHC RBC-ENTMCNC: 33.7 GM/DL — SIGNIFICANT CHANGE UP (ref 32–36)
MCV RBC AUTO: 86.7 FL — SIGNIFICANT CHANGE UP (ref 80–100)
MCV RBC AUTO: 86.7 FL — SIGNIFICANT CHANGE UP (ref 80–100)
MONOCYTES # BLD AUTO: 0.5 K/UL — SIGNIFICANT CHANGE UP (ref 0–0.9)
MONOCYTES # BLD AUTO: 0.5 K/UL — SIGNIFICANT CHANGE UP (ref 0–0.9)
MONOCYTES NFR BLD AUTO: 7.8 % — SIGNIFICANT CHANGE UP (ref 2–14)
MONOCYTES NFR BLD AUTO: 7.8 % — SIGNIFICANT CHANGE UP (ref 2–14)
NEUTROPHILS # BLD AUTO: 4.28 K/UL — SIGNIFICANT CHANGE UP (ref 1.8–7.4)
NEUTROPHILS # BLD AUTO: 4.28 K/UL — SIGNIFICANT CHANGE UP (ref 1.8–7.4)
NEUTROPHILS NFR BLD AUTO: 67 % — SIGNIFICANT CHANGE UP (ref 43–77)
NEUTROPHILS NFR BLD AUTO: 67 % — SIGNIFICANT CHANGE UP (ref 43–77)
NRBC # BLD: 0 /100 WBCS — SIGNIFICANT CHANGE UP (ref 0–0)
NRBC # BLD: 0 /100 WBCS — SIGNIFICANT CHANGE UP (ref 0–0)
PLATELET # BLD AUTO: 444 K/UL — HIGH (ref 150–400)
PLATELET # BLD AUTO: 444 K/UL — HIGH (ref 150–400)
RBC # BLD: 4.73 M/UL — SIGNIFICANT CHANGE UP (ref 3.8–5.2)
RBC # BLD: 4.73 M/UL — SIGNIFICANT CHANGE UP (ref 3.8–5.2)
RBC # FLD: 12.2 % — SIGNIFICANT CHANGE UP (ref 10.3–14.5)
RBC # FLD: 12.2 % — SIGNIFICANT CHANGE UP (ref 10.3–14.5)
WBC # BLD: 6.38 K/UL — SIGNIFICANT CHANGE UP (ref 3.8–10.5)
WBC # BLD: 6.38 K/UL — SIGNIFICANT CHANGE UP (ref 3.8–10.5)
WBC # FLD AUTO: 6.38 K/UL — SIGNIFICANT CHANGE UP (ref 3.8–10.5)
WBC # FLD AUTO: 6.38 K/UL — SIGNIFICANT CHANGE UP (ref 3.8–10.5)

## 2023-10-20 PROCEDURE — 99205 OFFICE O/P NEW HI 60 MIN: CPT

## 2023-10-20 PROCEDURE — T1013A: CUSTOM

## 2023-10-20 RX ORDER — NEOMYCIN SULFATE 500 MG/1
500 TABLET ORAL
Qty: 6 | Refills: 0 | Status: DISCONTINUED | COMMUNITY
Start: 2023-10-03 | End: 2023-10-20

## 2023-10-20 RX ORDER — METRONIDAZOLE 500 MG/1
500 TABLET ORAL
Qty: 6 | Refills: 0 | Status: DISCONTINUED | COMMUNITY
Start: 2023-10-03 | End: 2023-10-20

## 2023-10-23 LAB
25(OH)D3 SERPL-MCNC: 27.8 NG/ML
ALBUMIN SERPL ELPH-MCNC: 4.7 G/DL
ALP BLD-CCNC: 220 U/L
ALT SERPL-CCNC: 57 U/L
ANION GAP SERPL CALC-SCNC: 14 MMOL/L
APTT BLD: 35.9 SEC
AST SERPL-CCNC: 24 U/L
BILIRUB SERPL-MCNC: 0.5 MG/DL
BUN SERPL-MCNC: 13 MG/DL
CALCIUM SERPL-MCNC: 10 MG/DL
CANCER AG19-9 SERPL-ACNC: 5 U/ML
CEA SERPL-MCNC: 1.1 NG/ML
CHLORIDE SERPL-SCNC: 98 MMOL/L
CO2 SERPL-SCNC: 23 MMOL/L
CREAT SERPL-MCNC: 0.76 MG/DL
EGFR: 92 ML/MIN/1.73M2
FOLATE SERPL-MCNC: >20 NG/ML
GLUCOSE SERPL-MCNC: 99 MG/DL
HBV CORE IGG+IGM SER QL: NONREACTIVE
HBV SURFACE AB SER QL: NONREACTIVE
HBV SURFACE AB SERPL IA-ACNC: <3 MIU/ML
HBV SURFACE AG SER QL: NONREACTIVE
HCV AB SER QL: NONREACTIVE
HCV S/CO RATIO: 0.14 S/CO
HIV1+2 AB SPEC QL IA.RAPID: NONREACTIVE
INR PPP: 1.03 RATIO
IRON SATN MFR SERPL: 13 %
IRON SERPL-MCNC: 39 UG/DL
MAGNESIUM SERPL-MCNC: 2.3 MG/DL
PHOSPHATE SERPL-MCNC: 4.2 MG/DL
POTASSIUM SERPL-SCNC: 4.7 MMOL/L
PROT SERPL-MCNC: 7.4 G/DL
PT BLD: 11.7 SEC
SODIUM SERPL-SCNC: 135 MMOL/L
TIBC SERPL-MCNC: 301 UG/DL
UIBC SERPL-MCNC: 262 UG/DL
VIT B12 SERPL-MCNC: 1016 PG/ML

## 2023-10-25 ENCOUNTER — APPOINTMENT (OUTPATIENT)
Dept: COLORECTAL SURGERY | Facility: CLINIC | Age: 58
End: 2023-10-25
Payer: MEDICAID

## 2023-10-25 ENCOUNTER — NON-APPOINTMENT (OUTPATIENT)
Age: 58
End: 2023-10-25

## 2023-10-25 VITALS
RESPIRATION RATE: 14 BRPM | WEIGHT: 116 LBS | HEIGHT: 62 IN | BODY MASS INDEX: 21.35 KG/M2 | SYSTOLIC BLOOD PRESSURE: 138 MMHG | DIASTOLIC BLOOD PRESSURE: 88 MMHG | HEART RATE: 66 BPM | TEMPERATURE: 97.7 F

## 2023-10-25 DIAGNOSIS — E44.0 MODERATE PROTEIN-CALORIE MALNUTRITION: ICD-10-CM

## 2023-10-25 DIAGNOSIS — C19 MALIGNANT NEOPLASM OF RECTOSIGMOID JUNCTION: ICD-10-CM

## 2023-10-25 DIAGNOSIS — R73.03 PREDIABETES: ICD-10-CM

## 2023-10-25 DIAGNOSIS — K21.9 GASTRO-ESOPHAGEAL REFLUX DISEASE WITHOUT ESOPHAGITIS: ICD-10-CM

## 2023-10-25 PROCEDURE — 99024 POSTOP FOLLOW-UP VISIT: CPT

## 2023-10-25 RX ORDER — PROCHLORPERAZINE MALEATE 10 MG/1
10 TABLET ORAL
Qty: 30 | Refills: 2 | Status: ACTIVE | COMMUNITY
Start: 2023-10-25 | End: 1900-01-01

## 2023-10-25 RX ORDER — ONDANSETRON 8 MG/1
8 TABLET ORAL EVERY 8 HOURS
Qty: 30 | Refills: 2 | Status: ACTIVE | COMMUNITY
Start: 2023-10-25 | End: 1900-01-01

## 2023-10-27 ENCOUNTER — NON-APPOINTMENT (OUTPATIENT)
Age: 58
End: 2023-10-27

## 2023-10-30 ENCOUNTER — APPOINTMENT (OUTPATIENT)
Dept: HEMATOLOGY ONCOLOGY | Facility: CLINIC | Age: 58
End: 2023-10-30

## 2023-10-30 VITALS
SYSTOLIC BLOOD PRESSURE: 150 MMHG | WEIGHT: 116.4 LBS | HEIGHT: 61.81 IN | TEMPERATURE: 98.4 F | OXYGEN SATURATION: 98 % | DIASTOLIC BLOOD PRESSURE: 81 MMHG | HEART RATE: 76 BPM | BODY MASS INDEX: 21.42 KG/M2

## 2023-11-03 ENCOUNTER — RESULT REVIEW (OUTPATIENT)
Age: 58
End: 2023-11-03

## 2023-11-06 ENCOUNTER — NON-APPOINTMENT (OUTPATIENT)
Age: 58
End: 2023-11-06

## 2023-11-07 ENCOUNTER — RESULT REVIEW (OUTPATIENT)
Age: 58
End: 2023-11-07

## 2023-11-07 ENCOUNTER — APPOINTMENT (OUTPATIENT)
Dept: INFUSION THERAPY | Facility: CANCER CENTER | Age: 58
End: 2023-11-07

## 2023-11-07 LAB
BASOPHILS # BLD AUTO: 0.02 K/UL — SIGNIFICANT CHANGE UP (ref 0–0.2)
BASOPHILS # BLD AUTO: 0.02 K/UL — SIGNIFICANT CHANGE UP (ref 0–0.2)
BASOPHILS NFR BLD AUTO: 0.4 % — SIGNIFICANT CHANGE UP (ref 0–2)
BASOPHILS NFR BLD AUTO: 0.4 % — SIGNIFICANT CHANGE UP (ref 0–2)
EOSINOPHIL # BLD AUTO: 0.05 K/UL — SIGNIFICANT CHANGE UP (ref 0–0.5)
EOSINOPHIL # BLD AUTO: 0.05 K/UL — SIGNIFICANT CHANGE UP (ref 0–0.5)
EOSINOPHIL NFR BLD AUTO: 1 % — SIGNIFICANT CHANGE UP (ref 0–6)
EOSINOPHIL NFR BLD AUTO: 1 % — SIGNIFICANT CHANGE UP (ref 0–6)
HCT VFR BLD CALC: 37.4 % — SIGNIFICANT CHANGE UP (ref 34.5–45)
HCT VFR BLD CALC: 37.4 % — SIGNIFICANT CHANGE UP (ref 34.5–45)
HGB BLD-MCNC: 12.4 G/DL — SIGNIFICANT CHANGE UP (ref 11.5–15.5)
HGB BLD-MCNC: 12.4 G/DL — SIGNIFICANT CHANGE UP (ref 11.5–15.5)
IMM GRANULOCYTES NFR BLD AUTO: 0.2 % — SIGNIFICANT CHANGE UP (ref 0–0.9)
IMM GRANULOCYTES NFR BLD AUTO: 0.2 % — SIGNIFICANT CHANGE UP (ref 0–0.9)
LYMPHOCYTES # BLD AUTO: 1.3 K/UL — SIGNIFICANT CHANGE UP (ref 1–3.3)
LYMPHOCYTES # BLD AUTO: 1.3 K/UL — SIGNIFICANT CHANGE UP (ref 1–3.3)
LYMPHOCYTES # BLD AUTO: 27.3 % — SIGNIFICANT CHANGE UP (ref 13–44)
LYMPHOCYTES # BLD AUTO: 27.3 % — SIGNIFICANT CHANGE UP (ref 13–44)
MCHC RBC-ENTMCNC: 28.6 PG — SIGNIFICANT CHANGE UP (ref 27–34)
MCHC RBC-ENTMCNC: 28.6 PG — SIGNIFICANT CHANGE UP (ref 27–34)
MCHC RBC-ENTMCNC: 33.2 GM/DL — SIGNIFICANT CHANGE UP (ref 32–36)
MCHC RBC-ENTMCNC: 33.2 GM/DL — SIGNIFICANT CHANGE UP (ref 32–36)
MCV RBC AUTO: 86.2 FL — SIGNIFICANT CHANGE UP (ref 80–100)
MCV RBC AUTO: 86.2 FL — SIGNIFICANT CHANGE UP (ref 80–100)
MONOCYTES # BLD AUTO: 0.4 K/UL — SIGNIFICANT CHANGE UP (ref 0–0.9)
MONOCYTES # BLD AUTO: 0.4 K/UL — SIGNIFICANT CHANGE UP (ref 0–0.9)
MONOCYTES NFR BLD AUTO: 8.4 % — SIGNIFICANT CHANGE UP (ref 2–14)
MONOCYTES NFR BLD AUTO: 8.4 % — SIGNIFICANT CHANGE UP (ref 2–14)
NEUTROPHILS # BLD AUTO: 2.99 K/UL — SIGNIFICANT CHANGE UP (ref 1.8–7.4)
NEUTROPHILS # BLD AUTO: 2.99 K/UL — SIGNIFICANT CHANGE UP (ref 1.8–7.4)
NEUTROPHILS NFR BLD AUTO: 62.7 % — SIGNIFICANT CHANGE UP (ref 43–77)
NEUTROPHILS NFR BLD AUTO: 62.7 % — SIGNIFICANT CHANGE UP (ref 43–77)
NRBC # BLD: 0 /100 WBCS — SIGNIFICANT CHANGE UP (ref 0–0)
NRBC # BLD: 0 /100 WBCS — SIGNIFICANT CHANGE UP (ref 0–0)
PLATELET # BLD AUTO: 279 K/UL — SIGNIFICANT CHANGE UP (ref 150–400)
PLATELET # BLD AUTO: 279 K/UL — SIGNIFICANT CHANGE UP (ref 150–400)
RBC # BLD: 4.34 M/UL — SIGNIFICANT CHANGE UP (ref 3.8–5.2)
RBC # BLD: 4.34 M/UL — SIGNIFICANT CHANGE UP (ref 3.8–5.2)
RBC # FLD: 12.3 % — SIGNIFICANT CHANGE UP (ref 10.3–14.5)
RBC # FLD: 12.3 % — SIGNIFICANT CHANGE UP (ref 10.3–14.5)
WBC # BLD: 4.77 K/UL — SIGNIFICANT CHANGE UP (ref 3.8–10.5)
WBC # BLD: 4.77 K/UL — SIGNIFICANT CHANGE UP (ref 3.8–10.5)
WBC # FLD AUTO: 4.77 K/UL — SIGNIFICANT CHANGE UP (ref 3.8–10.5)
WBC # FLD AUTO: 4.77 K/UL — SIGNIFICANT CHANGE UP (ref 3.8–10.5)

## 2023-11-08 ENCOUNTER — NON-APPOINTMENT (OUTPATIENT)
Age: 58
End: 2023-11-08

## 2023-11-08 DIAGNOSIS — R11.2 NAUSEA WITH VOMITING, UNSPECIFIED: ICD-10-CM

## 2023-11-08 DIAGNOSIS — Z51.11 ENCOUNTER FOR ANTINEOPLASTIC CHEMOTHERAPY: ICD-10-CM

## 2023-11-08 LAB
ALBUMIN SERPL ELPH-MCNC: 4.1 G/DL — SIGNIFICANT CHANGE UP (ref 3.3–5)
ALBUMIN SERPL ELPH-MCNC: 4.1 G/DL — SIGNIFICANT CHANGE UP (ref 3.3–5)
ALP SERPL-CCNC: 120 U/L — SIGNIFICANT CHANGE UP (ref 40–120)
ALP SERPL-CCNC: 120 U/L — SIGNIFICANT CHANGE UP (ref 40–120)
ALT FLD-CCNC: 17 U/L — SIGNIFICANT CHANGE UP (ref 10–45)
ALT FLD-CCNC: 17 U/L — SIGNIFICANT CHANGE UP (ref 10–45)
ANION GAP SERPL CALC-SCNC: 12 MMOL/L — SIGNIFICANT CHANGE UP (ref 5–17)
ANION GAP SERPL CALC-SCNC: 12 MMOL/L — SIGNIFICANT CHANGE UP (ref 5–17)
AST SERPL-CCNC: 18 U/L — SIGNIFICANT CHANGE UP (ref 10–40)
AST SERPL-CCNC: 18 U/L — SIGNIFICANT CHANGE UP (ref 10–40)
BILIRUB SERPL-MCNC: 0.3 MG/DL — SIGNIFICANT CHANGE UP (ref 0.2–1.2)
BILIRUB SERPL-MCNC: 0.3 MG/DL — SIGNIFICANT CHANGE UP (ref 0.2–1.2)
BUN SERPL-MCNC: 10 MG/DL — SIGNIFICANT CHANGE UP (ref 7–23)
BUN SERPL-MCNC: 10 MG/DL — SIGNIFICANT CHANGE UP (ref 7–23)
CALCIUM SERPL-MCNC: 9.4 MG/DL — SIGNIFICANT CHANGE UP (ref 8.4–10.5)
CALCIUM SERPL-MCNC: 9.4 MG/DL — SIGNIFICANT CHANGE UP (ref 8.4–10.5)
CHLORIDE SERPL-SCNC: 104 MMOL/L — SIGNIFICANT CHANGE UP (ref 96–108)
CHLORIDE SERPL-SCNC: 104 MMOL/L — SIGNIFICANT CHANGE UP (ref 96–108)
CO2 SERPL-SCNC: 23 MMOL/L — SIGNIFICANT CHANGE UP (ref 22–31)
CO2 SERPL-SCNC: 23 MMOL/L — SIGNIFICANT CHANGE UP (ref 22–31)
CREAT SERPL-MCNC: 0.64 MG/DL — SIGNIFICANT CHANGE UP (ref 0.5–1.3)
CREAT SERPL-MCNC: 0.64 MG/DL — SIGNIFICANT CHANGE UP (ref 0.5–1.3)
EGFR: 104 ML/MIN/1.73M2 — SIGNIFICANT CHANGE UP
EGFR: 104 ML/MIN/1.73M2 — SIGNIFICANT CHANGE UP
GLUCOSE SERPL-MCNC: 106 MG/DL — HIGH (ref 70–99)
GLUCOSE SERPL-MCNC: 106 MG/DL — HIGH (ref 70–99)
MAGNESIUM SERPL-MCNC: 2.1 MG/DL — SIGNIFICANT CHANGE UP (ref 1.6–2.6)
MAGNESIUM SERPL-MCNC: 2.1 MG/DL — SIGNIFICANT CHANGE UP (ref 1.6–2.6)
PHOSPHATE SERPL-MCNC: 3.6 MG/DL — SIGNIFICANT CHANGE UP (ref 2.5–4.5)
PHOSPHATE SERPL-MCNC: 3.6 MG/DL — SIGNIFICANT CHANGE UP (ref 2.5–4.5)
POTASSIUM SERPL-MCNC: 4 MMOL/L — SIGNIFICANT CHANGE UP (ref 3.5–5.3)
POTASSIUM SERPL-MCNC: 4 MMOL/L — SIGNIFICANT CHANGE UP (ref 3.5–5.3)
POTASSIUM SERPL-SCNC: 4 MMOL/L — SIGNIFICANT CHANGE UP (ref 3.5–5.3)
POTASSIUM SERPL-SCNC: 4 MMOL/L — SIGNIFICANT CHANGE UP (ref 3.5–5.3)
PROT SERPL-MCNC: 6.3 G/DL — SIGNIFICANT CHANGE UP (ref 6–8.3)
PROT SERPL-MCNC: 6.3 G/DL — SIGNIFICANT CHANGE UP (ref 6–8.3)
SODIUM SERPL-SCNC: 139 MMOL/L — SIGNIFICANT CHANGE UP (ref 135–145)
SODIUM SERPL-SCNC: 139 MMOL/L — SIGNIFICANT CHANGE UP (ref 135–145)

## 2023-11-09 ENCOUNTER — APPOINTMENT (OUTPATIENT)
Dept: HEMATOLOGY ONCOLOGY | Facility: CLINIC | Age: 58
End: 2023-11-09
Payer: MEDICAID

## 2023-11-09 ENCOUNTER — APPOINTMENT (OUTPATIENT)
Dept: INFUSION THERAPY | Facility: CANCER CENTER | Age: 58
End: 2023-11-09

## 2023-11-09 VITALS
HEART RATE: 69 BPM | SYSTOLIC BLOOD PRESSURE: 114 MMHG | OXYGEN SATURATION: 98 % | TEMPERATURE: 98.6 F | BODY MASS INDEX: 21.31 KG/M2 | DIASTOLIC BLOOD PRESSURE: 71 MMHG | WEIGHT: 115.8 LBS

## 2023-11-09 PROCEDURE — 99214 OFFICE O/P EST MOD 30 MIN: CPT

## 2023-11-20 ENCOUNTER — RESULT REVIEW (OUTPATIENT)
Age: 58
End: 2023-11-20

## 2023-11-22 ENCOUNTER — RESULT REVIEW (OUTPATIENT)
Age: 58
End: 2023-11-22

## 2023-11-22 ENCOUNTER — APPOINTMENT (OUTPATIENT)
Dept: INFUSION THERAPY | Facility: CANCER CENTER | Age: 58
End: 2023-11-22

## 2023-11-22 ENCOUNTER — APPOINTMENT (OUTPATIENT)
Dept: HEMATOLOGY ONCOLOGY | Facility: CLINIC | Age: 58
End: 2023-11-22
Payer: MEDICAID

## 2023-11-22 LAB
ALBUMIN SERPL ELPH-MCNC: 4.3 G/DL — SIGNIFICANT CHANGE UP (ref 3.3–5)
ALBUMIN SERPL ELPH-MCNC: 4.3 G/DL — SIGNIFICANT CHANGE UP (ref 3.3–5)
ALP SERPL-CCNC: 134 U/L — HIGH (ref 40–120)
ALP SERPL-CCNC: 134 U/L — HIGH (ref 40–120)
ALT FLD-CCNC: 24 U/L — SIGNIFICANT CHANGE UP (ref 10–45)
ALT FLD-CCNC: 24 U/L — SIGNIFICANT CHANGE UP (ref 10–45)
ANION GAP SERPL CALC-SCNC: 10 MMOL/L — SIGNIFICANT CHANGE UP (ref 5–17)
ANION GAP SERPL CALC-SCNC: 10 MMOL/L — SIGNIFICANT CHANGE UP (ref 5–17)
AST SERPL-CCNC: 23 U/L — SIGNIFICANT CHANGE UP (ref 10–40)
AST SERPL-CCNC: 23 U/L — SIGNIFICANT CHANGE UP (ref 10–40)
BASOPHILS # BLD AUTO: 0.04 K/UL — SIGNIFICANT CHANGE UP (ref 0–0.2)
BASOPHILS # BLD AUTO: 0.04 K/UL — SIGNIFICANT CHANGE UP (ref 0–0.2)
BASOPHILS NFR BLD AUTO: 2 % — SIGNIFICANT CHANGE UP (ref 0–2)
BASOPHILS NFR BLD AUTO: 2 % — SIGNIFICANT CHANGE UP (ref 0–2)
BILIRUB SERPL-MCNC: 0.6 MG/DL — SIGNIFICANT CHANGE UP (ref 0.2–1.2)
BILIRUB SERPL-MCNC: 0.6 MG/DL — SIGNIFICANT CHANGE UP (ref 0.2–1.2)
BUN SERPL-MCNC: 11 MG/DL — SIGNIFICANT CHANGE UP (ref 7–23)
BUN SERPL-MCNC: 11 MG/DL — SIGNIFICANT CHANGE UP (ref 7–23)
CALCIUM SERPL-MCNC: 9 MG/DL — SIGNIFICANT CHANGE UP (ref 8.4–10.5)
CALCIUM SERPL-MCNC: 9 MG/DL — SIGNIFICANT CHANGE UP (ref 8.4–10.5)
CHLORIDE SERPL-SCNC: 102 MMOL/L — SIGNIFICANT CHANGE UP (ref 96–108)
CHLORIDE SERPL-SCNC: 102 MMOL/L — SIGNIFICANT CHANGE UP (ref 96–108)
CO2 SERPL-SCNC: 24 MMOL/L — SIGNIFICANT CHANGE UP (ref 22–31)
CO2 SERPL-SCNC: 24 MMOL/L — SIGNIFICANT CHANGE UP (ref 22–31)
CREAT SERPL-MCNC: 0.85 MG/DL — SIGNIFICANT CHANGE UP (ref 0.5–1.3)
CREAT SERPL-MCNC: 0.85 MG/DL — SIGNIFICANT CHANGE UP (ref 0.5–1.3)
EGFR: 80 ML/MIN/1.73M2 — SIGNIFICANT CHANGE UP
EGFR: 80 ML/MIN/1.73M2 — SIGNIFICANT CHANGE UP
EOSINOPHIL # BLD AUTO: 0.06 K/UL — SIGNIFICANT CHANGE UP (ref 0–0.5)
EOSINOPHIL # BLD AUTO: 0.06 K/UL — SIGNIFICANT CHANGE UP (ref 0–0.5)
EOSINOPHIL NFR BLD AUTO: 3 % — SIGNIFICANT CHANGE UP (ref 0–6)
EOSINOPHIL NFR BLD AUTO: 3 % — SIGNIFICANT CHANGE UP (ref 0–6)
GLUCOSE SERPL-MCNC: 110 MG/DL — HIGH (ref 70–99)
GLUCOSE SERPL-MCNC: 110 MG/DL — HIGH (ref 70–99)
HCT VFR BLD CALC: 36.1 % — SIGNIFICANT CHANGE UP (ref 34.5–45)
HCT VFR BLD CALC: 36.1 % — SIGNIFICANT CHANGE UP (ref 34.5–45)
HGB BLD-MCNC: 12 G/DL — SIGNIFICANT CHANGE UP (ref 11.5–15.5)
HGB BLD-MCNC: 12 G/DL — SIGNIFICANT CHANGE UP (ref 11.5–15.5)
LYMPHOCYTES # BLD AUTO: 1.05 K/UL — SIGNIFICANT CHANGE UP (ref 1–3.3)
LYMPHOCYTES # BLD AUTO: 1.05 K/UL — SIGNIFICANT CHANGE UP (ref 1–3.3)
LYMPHOCYTES # BLD AUTO: 56 % — HIGH (ref 13–44)
LYMPHOCYTES # BLD AUTO: 56 % — HIGH (ref 13–44)
MAGNESIUM SERPL-MCNC: 2.2 MG/DL — SIGNIFICANT CHANGE UP (ref 1.6–2.6)
MAGNESIUM SERPL-MCNC: 2.2 MG/DL — SIGNIFICANT CHANGE UP (ref 1.6–2.6)
MCHC RBC-ENTMCNC: 28.7 PG — SIGNIFICANT CHANGE UP (ref 27–34)
MCHC RBC-ENTMCNC: 28.7 PG — SIGNIFICANT CHANGE UP (ref 27–34)
MCHC RBC-ENTMCNC: 33.2 GM/DL — SIGNIFICANT CHANGE UP (ref 32–36)
MCHC RBC-ENTMCNC: 33.2 GM/DL — SIGNIFICANT CHANGE UP (ref 32–36)
MCV RBC AUTO: 86.4 FL — SIGNIFICANT CHANGE UP (ref 80–100)
MCV RBC AUTO: 86.4 FL — SIGNIFICANT CHANGE UP (ref 80–100)
MONOCYTES # BLD AUTO: 0.32 K/UL — SIGNIFICANT CHANGE UP (ref 0–0.9)
MONOCYTES # BLD AUTO: 0.32 K/UL — SIGNIFICANT CHANGE UP (ref 0–0.9)
MONOCYTES NFR BLD AUTO: 17 % — HIGH (ref 2–14)
MONOCYTES NFR BLD AUTO: 17 % — HIGH (ref 2–14)
NEUTROPHILS # BLD AUTO: 0.41 K/UL — LOW (ref 1.8–7.4)
NEUTROPHILS # BLD AUTO: 0.41 K/UL — LOW (ref 1.8–7.4)
NEUTROPHILS NFR BLD AUTO: 22 % — LOW (ref 43–77)
NEUTROPHILS NFR BLD AUTO: 22 % — LOW (ref 43–77)
NRBC # BLD: 0 /100 — SIGNIFICANT CHANGE UP (ref 0–0)
NRBC # BLD: 0 /100 — SIGNIFICANT CHANGE UP (ref 0–0)
NRBC # BLD: SIGNIFICANT CHANGE UP /100 WBCS (ref 0–0)
NRBC # BLD: SIGNIFICANT CHANGE UP /100 WBCS (ref 0–0)
PHOSPHATE SERPL-MCNC: 3.6 MG/DL — SIGNIFICANT CHANGE UP (ref 2.5–4.5)
PHOSPHATE SERPL-MCNC: 3.6 MG/DL — SIGNIFICANT CHANGE UP (ref 2.5–4.5)
PLAT MORPH BLD: NORMAL — SIGNIFICANT CHANGE UP
PLAT MORPH BLD: NORMAL — SIGNIFICANT CHANGE UP
PLATELET # BLD AUTO: 219 K/UL — SIGNIFICANT CHANGE UP (ref 150–400)
PLATELET # BLD AUTO: 219 K/UL — SIGNIFICANT CHANGE UP (ref 150–400)
POTASSIUM SERPL-MCNC: 4.2 MMOL/L — SIGNIFICANT CHANGE UP (ref 3.5–5.3)
POTASSIUM SERPL-MCNC: 4.2 MMOL/L — SIGNIFICANT CHANGE UP (ref 3.5–5.3)
POTASSIUM SERPL-SCNC: 4.2 MMOL/L — SIGNIFICANT CHANGE UP (ref 3.5–5.3)
POTASSIUM SERPL-SCNC: 4.2 MMOL/L — SIGNIFICANT CHANGE UP (ref 3.5–5.3)
PROT SERPL-MCNC: 6.3 G/DL — SIGNIFICANT CHANGE UP (ref 6–8.3)
PROT SERPL-MCNC: 6.3 G/DL — SIGNIFICANT CHANGE UP (ref 6–8.3)
RBC # BLD: 4.18 M/UL — SIGNIFICANT CHANGE UP (ref 3.8–5.2)
RBC # BLD: 4.18 M/UL — SIGNIFICANT CHANGE UP (ref 3.8–5.2)
RBC # FLD: 13.2 % — SIGNIFICANT CHANGE UP (ref 10.3–14.5)
RBC # FLD: 13.2 % — SIGNIFICANT CHANGE UP (ref 10.3–14.5)
RBC BLD AUTO: NORMAL — SIGNIFICANT CHANGE UP
RBC BLD AUTO: NORMAL — SIGNIFICANT CHANGE UP
SODIUM SERPL-SCNC: 136 MMOL/L — SIGNIFICANT CHANGE UP (ref 135–145)
SODIUM SERPL-SCNC: 136 MMOL/L — SIGNIFICANT CHANGE UP (ref 135–145)
WBC # BLD: 1.87 K/UL — LOW (ref 3.8–10.5)
WBC # BLD: 1.87 K/UL — LOW (ref 3.8–10.5)
WBC # FLD AUTO: 1.87 K/UL — LOW (ref 3.8–10.5)
WBC # FLD AUTO: 1.87 K/UL — LOW (ref 3.8–10.5)

## 2023-11-22 PROCEDURE — 99214 OFFICE O/P EST MOD 30 MIN: CPT

## 2023-11-24 ENCOUNTER — APPOINTMENT (OUTPATIENT)
Dept: INFUSION THERAPY | Facility: CANCER CENTER | Age: 58
End: 2023-11-24

## 2023-11-26 NOTE — CDI QUERY NOTE - NSCDIOTHERTXTBX_GEN_ALL_CORE_HH
Pathology Finding:    Further clarification is required regarding pathology findings.  No documentation addressing the pathology findings were found in the medical record at time of this review.    Please clarify after further study, evaluation, and treatment if you concur with the Pathology findings.    -Concur with Pathology Findings Noting: Adenocarcinoma with 8/26 lymph nodes are positive for metastatic carcinoma  -Do Not Concur with Pathology Findings  -Other Please Specify  -Not Clinically Significant    Chart Documentation Location:        Surgical Pathology Report:   Surgical Pathology Report:   ACCESSION No: [id number]   Patient: JUAN STEELE   Accession: [id number]   Collected Date/Time: 10/12/2023 12:50 EDT   Received Date/Time: 10/12/2023 15:02 EDT   Surgical Pathology Report - Auth (Verified)   Specimen(s) Submitted   1 Rectosigmoid colon with lymph node and tumor   2 Colorectal anastomosis proximal donut   3 Colorectal anastomosis distal donut   Final Diagnosis   1. Large bowel, rectosigmoid, excision:   -Infiltrating moderately differentiated adenocarcinoma measuring 3.0 cm   -Adenocarcinoma infiltrates into the muscularis propria   -8/26 lymph nodes are positive for metastatic carcinoma. One lymph   node   with focal foreign body giant cell reaction   -Lymphovascular space invasion is not identified   -Tattoo dye present   -Please see synoptic summary   2. Large bowel, proximal anastomosis, excision:   -Negative for malignancy   3. Large bowel, distal anastomosis, excision:   -Negative for malignancy

## 2023-11-26 NOTE — CHART NOTE - NSCHARTNOTEFT_GEN_A_CORE
-Concur with Pathology Findings Noting: Adenocarcinoma with 8/26 lymph nodes are positive for metastatic carcinoma  Pt also with moderate proten-calorie, malnutrtion.

## 2023-11-26 NOTE — CDI QUERY NOTE - NSCDIOTHERTXTBX2_GEN_ALL_CORE_FT
There is conflicting documentation regarding the patient's nutrition status.      The patient received a nutrition assessment by a Registered Dietician on 10/14/2023    The documentation of this assessment states:   Moderate protein-calorie malnutrition  2) Add ensure plus high protein BID to optimize PO intake (provides 350 kcal, 20g protein/ shake)     Chart documentation also states the patient is well nourished.  Surgical progress note on 10/13/2023:  GENERAL: NAD, well developed, well nourished       Can the nutrition diagnosis and criteria be clarified, after study?    - Patient was not well-nourished and was found to have moderate protein-calorie malnutrition  (please also refer to the Dietician Assessment for further details)  -Other (please specify)  -Not clinically significant

## 2023-11-29 ENCOUNTER — APPOINTMENT (OUTPATIENT)
Age: 58
End: 2023-11-29

## 2023-11-29 ENCOUNTER — RESULT REVIEW (OUTPATIENT)
Age: 58
End: 2023-11-29

## 2023-11-29 LAB
BASOPHILS # BLD AUTO: 0.01 K/UL — SIGNIFICANT CHANGE UP (ref 0–0.2)
BASOPHILS # BLD AUTO: 0.01 K/UL — SIGNIFICANT CHANGE UP (ref 0–0.2)
BASOPHILS NFR BLD AUTO: 0.4 % — SIGNIFICANT CHANGE UP (ref 0–2)
BASOPHILS NFR BLD AUTO: 0.4 % — SIGNIFICANT CHANGE UP (ref 0–2)
EOSINOPHIL # BLD AUTO: 0.03 K/UL — SIGNIFICANT CHANGE UP (ref 0–0.5)
EOSINOPHIL # BLD AUTO: 0.03 K/UL — SIGNIFICANT CHANGE UP (ref 0–0.5)
EOSINOPHIL NFR BLD AUTO: 1.1 % — SIGNIFICANT CHANGE UP (ref 0–6)
EOSINOPHIL NFR BLD AUTO: 1.1 % — SIGNIFICANT CHANGE UP (ref 0–6)
HCT VFR BLD CALC: 38.1 % — SIGNIFICANT CHANGE UP (ref 34.5–45)
HCT VFR BLD CALC: 38.1 % — SIGNIFICANT CHANGE UP (ref 34.5–45)
HGB BLD-MCNC: 12.9 G/DL — SIGNIFICANT CHANGE UP (ref 11.5–15.5)
HGB BLD-MCNC: 12.9 G/DL — SIGNIFICANT CHANGE UP (ref 11.5–15.5)
IMM GRANULOCYTES NFR BLD AUTO: 1.4 % — HIGH (ref 0–0.9)
IMM GRANULOCYTES NFR BLD AUTO: 1.4 % — HIGH (ref 0–0.9)
LYMPHOCYTES # BLD AUTO: 1.08 K/UL — SIGNIFICANT CHANGE UP (ref 1–3.3)
LYMPHOCYTES # BLD AUTO: 1.08 K/UL — SIGNIFICANT CHANGE UP (ref 1–3.3)
LYMPHOCYTES # BLD AUTO: 37.9 % — SIGNIFICANT CHANGE UP (ref 13–44)
LYMPHOCYTES # BLD AUTO: 37.9 % — SIGNIFICANT CHANGE UP (ref 13–44)
MCHC RBC-ENTMCNC: 29.7 PG — SIGNIFICANT CHANGE UP (ref 27–34)
MCHC RBC-ENTMCNC: 29.7 PG — SIGNIFICANT CHANGE UP (ref 27–34)
MCHC RBC-ENTMCNC: 33.9 GM/DL — SIGNIFICANT CHANGE UP (ref 32–36)
MCHC RBC-ENTMCNC: 33.9 GM/DL — SIGNIFICANT CHANGE UP (ref 32–36)
MCV RBC AUTO: 87.6 FL — SIGNIFICANT CHANGE UP (ref 80–100)
MCV RBC AUTO: 87.6 FL — SIGNIFICANT CHANGE UP (ref 80–100)
MONOCYTES # BLD AUTO: 0.45 K/UL — SIGNIFICANT CHANGE UP (ref 0–0.9)
MONOCYTES # BLD AUTO: 0.45 K/UL — SIGNIFICANT CHANGE UP (ref 0–0.9)
MONOCYTES NFR BLD AUTO: 15.8 % — HIGH (ref 2–14)
MONOCYTES NFR BLD AUTO: 15.8 % — HIGH (ref 2–14)
NEUTROPHILS # BLD AUTO: 1.24 K/UL — LOW (ref 1.8–7.4)
NEUTROPHILS # BLD AUTO: 1.24 K/UL — LOW (ref 1.8–7.4)
NEUTROPHILS NFR BLD AUTO: 43.4 % — SIGNIFICANT CHANGE UP (ref 43–77)
NEUTROPHILS NFR BLD AUTO: 43.4 % — SIGNIFICANT CHANGE UP (ref 43–77)
NRBC # BLD: 0 /100 WBCS — SIGNIFICANT CHANGE UP (ref 0–0)
NRBC # BLD: 0 /100 WBCS — SIGNIFICANT CHANGE UP (ref 0–0)
PLATELET # BLD AUTO: 254 K/UL — SIGNIFICANT CHANGE UP (ref 150–400)
PLATELET # BLD AUTO: 254 K/UL — SIGNIFICANT CHANGE UP (ref 150–400)
RBC # BLD: 4.35 M/UL — SIGNIFICANT CHANGE UP (ref 3.8–5.2)
RBC # BLD: 4.35 M/UL — SIGNIFICANT CHANGE UP (ref 3.8–5.2)
RBC # FLD: 13.8 % — SIGNIFICANT CHANGE UP (ref 10.3–14.5)
RBC # FLD: 13.8 % — SIGNIFICANT CHANGE UP (ref 10.3–14.5)
WBC # BLD: 2.85 K/UL — LOW (ref 3.8–10.5)
WBC # BLD: 2.85 K/UL — LOW (ref 3.8–10.5)
WBC # FLD AUTO: 2.85 K/UL — LOW (ref 3.8–10.5)
WBC # FLD AUTO: 2.85 K/UL — LOW (ref 3.8–10.5)

## 2023-12-01 ENCOUNTER — APPOINTMENT (OUTPATIENT)
Age: 58
End: 2023-12-01

## 2023-12-04 DIAGNOSIS — Z51.89 ENCOUNTER FOR OTHER SPECIFIED AFTERCARE: ICD-10-CM

## 2023-12-12 ENCOUNTER — RESULT REVIEW (OUTPATIENT)
Age: 58
End: 2023-12-12

## 2023-12-12 ENCOUNTER — APPOINTMENT (OUTPATIENT)
Dept: INFUSION THERAPY | Facility: CANCER CENTER | Age: 58
End: 2023-12-12

## 2023-12-12 LAB
ANISOCYTOSIS BLD QL: SLIGHT — SIGNIFICANT CHANGE UP
ANISOCYTOSIS BLD QL: SLIGHT — SIGNIFICANT CHANGE UP
BASO STIPL BLD QL SMEAR: PRESENT — SIGNIFICANT CHANGE UP
BASO STIPL BLD QL SMEAR: PRESENT — SIGNIFICANT CHANGE UP
BASOPHILS # BLD AUTO: 0.1 K/UL — SIGNIFICANT CHANGE UP (ref 0–0.2)
BASOPHILS # BLD AUTO: 0.1 K/UL — SIGNIFICANT CHANGE UP (ref 0–0.2)
BASOPHILS NFR BLD AUTO: 1 % — SIGNIFICANT CHANGE UP (ref 0–2)
BASOPHILS NFR BLD AUTO: 1 % — SIGNIFICANT CHANGE UP (ref 0–2)
DOHLE BOD BLD QL SMEAR: PRESENT — SIGNIFICANT CHANGE UP
DOHLE BOD BLD QL SMEAR: PRESENT — SIGNIFICANT CHANGE UP
EOSINOPHIL # BLD AUTO: 0 K/UL — SIGNIFICANT CHANGE UP (ref 0–0.5)
EOSINOPHIL # BLD AUTO: 0 K/UL — SIGNIFICANT CHANGE UP (ref 0–0.5)
EOSINOPHIL NFR BLD AUTO: 0 % — SIGNIFICANT CHANGE UP (ref 0–6)
EOSINOPHIL NFR BLD AUTO: 0 % — SIGNIFICANT CHANGE UP (ref 0–6)
GIANT PLATELETS BLD QL SMEAR: PRESENT — SIGNIFICANT CHANGE UP
GIANT PLATELETS BLD QL SMEAR: PRESENT — SIGNIFICANT CHANGE UP
HCT VFR BLD CALC: 37.7 % — SIGNIFICANT CHANGE UP (ref 34.5–45)
HCT VFR BLD CALC: 37.7 % — SIGNIFICANT CHANGE UP (ref 34.5–45)
HGB BLD-MCNC: 12.9 G/DL — SIGNIFICANT CHANGE UP (ref 11.5–15.5)
HGB BLD-MCNC: 12.9 G/DL — SIGNIFICANT CHANGE UP (ref 11.5–15.5)
LG PLATELETS BLD QL AUTO: SIGNIFICANT CHANGE UP
LG PLATELETS BLD QL AUTO: SIGNIFICANT CHANGE UP
LYMPHOCYTES # BLD AUTO: 1.09 K/UL — SIGNIFICANT CHANGE UP (ref 1–3.3)
LYMPHOCYTES # BLD AUTO: 1.09 K/UL — SIGNIFICANT CHANGE UP (ref 1–3.3)
LYMPHOCYTES # BLD AUTO: 11 % — LOW (ref 13–44)
LYMPHOCYTES # BLD AUTO: 11 % — LOW (ref 13–44)
MCHC RBC-ENTMCNC: 29.4 PG — SIGNIFICANT CHANGE UP (ref 27–34)
MCHC RBC-ENTMCNC: 29.4 PG — SIGNIFICANT CHANGE UP (ref 27–34)
MCHC RBC-ENTMCNC: 34.2 GM/DL — SIGNIFICANT CHANGE UP (ref 32–36)
MCHC RBC-ENTMCNC: 34.2 GM/DL — SIGNIFICANT CHANGE UP (ref 32–36)
MCV RBC AUTO: 85.9 FL — SIGNIFICANT CHANGE UP (ref 80–100)
MCV RBC AUTO: 85.9 FL — SIGNIFICANT CHANGE UP (ref 80–100)
METAMYELOCYTES # FLD: 1 % — HIGH (ref 0–0)
METAMYELOCYTES # FLD: 1 % — HIGH (ref 0–0)
MICROCYTES BLD QL: SLIGHT — SIGNIFICANT CHANGE UP
MICROCYTES BLD QL: SLIGHT — SIGNIFICANT CHANGE UP
MONOCYTES # BLD AUTO: 1.28 K/UL — HIGH (ref 0–0.9)
MONOCYTES # BLD AUTO: 1.28 K/UL — HIGH (ref 0–0.9)
MONOCYTES NFR BLD AUTO: 13 % — SIGNIFICANT CHANGE UP (ref 2–14)
MONOCYTES NFR BLD AUTO: 13 % — SIGNIFICANT CHANGE UP (ref 2–14)
MYELOCYTES NFR BLD: 3 % — HIGH (ref 0–0)
MYELOCYTES NFR BLD: 3 % — HIGH (ref 0–0)
NEUTROPHILS # BLD AUTO: 6.42 K/UL — SIGNIFICANT CHANGE UP (ref 1.8–7.4)
NEUTROPHILS # BLD AUTO: 6.42 K/UL — SIGNIFICANT CHANGE UP (ref 1.8–7.4)
NEUTROPHILS NFR BLD AUTO: 53 % — SIGNIFICANT CHANGE UP (ref 43–77)
NEUTROPHILS NFR BLD AUTO: 53 % — SIGNIFICANT CHANGE UP (ref 43–77)
NEUTS BAND # BLD: 12 % — HIGH (ref 0–8)
NEUTS BAND # BLD: 12 % — HIGH (ref 0–8)
NRBC # BLD: 0 /100 — SIGNIFICANT CHANGE UP (ref 0–0)
NRBC # BLD: 0 /100 — SIGNIFICANT CHANGE UP (ref 0–0)
NRBC # BLD: SIGNIFICANT CHANGE UP /100 WBCS (ref 0–0)
NRBC # BLD: SIGNIFICANT CHANGE UP /100 WBCS (ref 0–0)
PLAT MORPH BLD: NORMAL — SIGNIFICANT CHANGE UP
PLAT MORPH BLD: NORMAL — SIGNIFICANT CHANGE UP
PLATELET # BLD AUTO: 152 K/UL — SIGNIFICANT CHANGE UP (ref 150–400)
PLATELET # BLD AUTO: 152 K/UL — SIGNIFICANT CHANGE UP (ref 150–400)
POIKILOCYTOSIS BLD QL AUTO: SLIGHT — SIGNIFICANT CHANGE UP
POIKILOCYTOSIS BLD QL AUTO: SLIGHT — SIGNIFICANT CHANGE UP
POLYCHROMASIA BLD QL SMEAR: SLIGHT — SIGNIFICANT CHANGE UP
POLYCHROMASIA BLD QL SMEAR: SLIGHT — SIGNIFICANT CHANGE UP
RBC # BLD: 4.39 M/UL — SIGNIFICANT CHANGE UP (ref 3.8–5.2)
RBC # BLD: 4.39 M/UL — SIGNIFICANT CHANGE UP (ref 3.8–5.2)
RBC # FLD: 13.8 % — SIGNIFICANT CHANGE UP (ref 10.3–14.5)
RBC # FLD: 13.8 % — SIGNIFICANT CHANGE UP (ref 10.3–14.5)
RBC BLD AUTO: NORMAL — SIGNIFICANT CHANGE UP
RBC BLD AUTO: NORMAL — SIGNIFICANT CHANGE UP
VARIANT LYMPHS # BLD: 6 % — SIGNIFICANT CHANGE UP (ref 0–6)
VARIANT LYMPHS # BLD: 6 % — SIGNIFICANT CHANGE UP (ref 0–6)
WBC # BLD: 9.88 K/UL — SIGNIFICANT CHANGE UP (ref 3.8–10.5)
WBC # BLD: 9.88 K/UL — SIGNIFICANT CHANGE UP (ref 3.8–10.5)
WBC # FLD AUTO: 9.88 K/UL — SIGNIFICANT CHANGE UP (ref 3.8–10.5)
WBC # FLD AUTO: 9.88 K/UL — SIGNIFICANT CHANGE UP (ref 3.8–10.5)

## 2023-12-13 LAB
ALBUMIN SERPL ELPH-MCNC: 4.4 G/DL — SIGNIFICANT CHANGE UP (ref 3.3–5)
ALBUMIN SERPL ELPH-MCNC: 4.4 G/DL — SIGNIFICANT CHANGE UP (ref 3.3–5)
ALP SERPL-CCNC: 196 U/L — HIGH (ref 40–120)
ALP SERPL-CCNC: 196 U/L — HIGH (ref 40–120)
ALT FLD-CCNC: 40 U/L — SIGNIFICANT CHANGE UP (ref 10–45)
ALT FLD-CCNC: 40 U/L — SIGNIFICANT CHANGE UP (ref 10–45)
ANION GAP SERPL CALC-SCNC: 12 MMOL/L — SIGNIFICANT CHANGE UP (ref 5–17)
ANION GAP SERPL CALC-SCNC: 12 MMOL/L — SIGNIFICANT CHANGE UP (ref 5–17)
AST SERPL-CCNC: 37 U/L — SIGNIFICANT CHANGE UP (ref 10–40)
AST SERPL-CCNC: 37 U/L — SIGNIFICANT CHANGE UP (ref 10–40)
BILIRUB SERPL-MCNC: 0.3 MG/DL — SIGNIFICANT CHANGE UP (ref 0.2–1.2)
BILIRUB SERPL-MCNC: 0.3 MG/DL — SIGNIFICANT CHANGE UP (ref 0.2–1.2)
BUN SERPL-MCNC: 13 MG/DL — SIGNIFICANT CHANGE UP (ref 7–23)
BUN SERPL-MCNC: 13 MG/DL — SIGNIFICANT CHANGE UP (ref 7–23)
CALCIUM SERPL-MCNC: 9.7 MG/DL — SIGNIFICANT CHANGE UP (ref 8.4–10.5)
CALCIUM SERPL-MCNC: 9.7 MG/DL — SIGNIFICANT CHANGE UP (ref 8.4–10.5)
CHLORIDE SERPL-SCNC: 105 MMOL/L — SIGNIFICANT CHANGE UP (ref 96–108)
CHLORIDE SERPL-SCNC: 105 MMOL/L — SIGNIFICANT CHANGE UP (ref 96–108)
CO2 SERPL-SCNC: 24 MMOL/L — SIGNIFICANT CHANGE UP (ref 22–31)
CO2 SERPL-SCNC: 24 MMOL/L — SIGNIFICANT CHANGE UP (ref 22–31)
CREAT SERPL-MCNC: 0.75 MG/DL — SIGNIFICANT CHANGE UP (ref 0.5–1.3)
CREAT SERPL-MCNC: 0.75 MG/DL — SIGNIFICANT CHANGE UP (ref 0.5–1.3)
EGFR: 93 ML/MIN/1.73M2 — SIGNIFICANT CHANGE UP
EGFR: 93 ML/MIN/1.73M2 — SIGNIFICANT CHANGE UP
GLUCOSE SERPL-MCNC: 145 MG/DL — HIGH (ref 70–99)
GLUCOSE SERPL-MCNC: 145 MG/DL — HIGH (ref 70–99)
MAGNESIUM SERPL-MCNC: 2.2 MG/DL — SIGNIFICANT CHANGE UP (ref 1.6–2.6)
MAGNESIUM SERPL-MCNC: 2.2 MG/DL — SIGNIFICANT CHANGE UP (ref 1.6–2.6)
PHOSPHATE SERPL-MCNC: 4.4 MG/DL — SIGNIFICANT CHANGE UP (ref 2.5–4.5)
PHOSPHATE SERPL-MCNC: 4.4 MG/DL — SIGNIFICANT CHANGE UP (ref 2.5–4.5)
POTASSIUM SERPL-MCNC: 4.1 MMOL/L — SIGNIFICANT CHANGE UP (ref 3.5–5.3)
POTASSIUM SERPL-MCNC: 4.1 MMOL/L — SIGNIFICANT CHANGE UP (ref 3.5–5.3)
POTASSIUM SERPL-SCNC: 4.1 MMOL/L — SIGNIFICANT CHANGE UP (ref 3.5–5.3)
POTASSIUM SERPL-SCNC: 4.1 MMOL/L — SIGNIFICANT CHANGE UP (ref 3.5–5.3)
PROT SERPL-MCNC: 6.4 G/DL — SIGNIFICANT CHANGE UP (ref 6–8.3)
PROT SERPL-MCNC: 6.4 G/DL — SIGNIFICANT CHANGE UP (ref 6–8.3)
SODIUM SERPL-SCNC: 141 MMOL/L — SIGNIFICANT CHANGE UP (ref 135–145)
SODIUM SERPL-SCNC: 141 MMOL/L — SIGNIFICANT CHANGE UP (ref 135–145)

## 2023-12-14 ENCOUNTER — OUTPATIENT (OUTPATIENT)
Dept: OUTPATIENT SERVICES | Facility: HOSPITAL | Age: 58
LOS: 1 days | End: 2023-12-14
Payer: COMMERCIAL

## 2023-12-14 ENCOUNTER — APPOINTMENT (OUTPATIENT)
Dept: INFUSION THERAPY | Facility: CANCER CENTER | Age: 58
End: 2023-12-14

## 2023-12-14 DIAGNOSIS — Z98.890 OTHER SPECIFIED POSTPROCEDURAL STATES: Chronic | ICD-10-CM

## 2023-12-14 DIAGNOSIS — C20 MALIGNANT NEOPLASM OF RECTUM: ICD-10-CM

## 2023-12-14 PROCEDURE — 96375 TX/PRO/DX INJ NEW DRUG ADDON: CPT

## 2023-12-14 PROCEDURE — 80053 COMPREHEN METABOLIC PANEL: CPT

## 2023-12-14 PROCEDURE — 96368 THER/DIAG CONCURRENT INF: CPT

## 2023-12-14 PROCEDURE — 96377 APPLICATON ON-BODY INJECTOR: CPT

## 2023-12-14 PROCEDURE — 96415 CHEMO IV INFUSION ADDL HR: CPT

## 2023-12-14 PROCEDURE — 96413 CHEMO IV INFUSION 1 HR: CPT

## 2023-12-14 PROCEDURE — 96411 CHEMO IV PUSH ADDL DRUG: CPT

## 2023-12-14 PROCEDURE — 85027 COMPLETE CBC AUTOMATED: CPT

## 2023-12-14 PROCEDURE — 96523 IRRIG DRUG DELIVERY DEVICE: CPT

## 2023-12-14 PROCEDURE — 84100 ASSAY OF PHOSPHORUS: CPT

## 2023-12-14 PROCEDURE — 83735 ASSAY OF MAGNESIUM: CPT

## 2023-12-14 PROCEDURE — 96416 CHEMO PROLONG INFUSE W/PUMP: CPT

## 2023-12-15 DIAGNOSIS — Z51.89 ENCOUNTER FOR OTHER SPECIFIED AFTERCARE: ICD-10-CM

## 2023-12-21 ENCOUNTER — NON-APPOINTMENT (OUTPATIENT)
Age: 58
End: 2023-12-21

## 2023-12-26 ENCOUNTER — RESULT REVIEW (OUTPATIENT)
Age: 58
End: 2023-12-26

## 2023-12-26 ENCOUNTER — APPOINTMENT (OUTPATIENT)
Dept: INFUSION THERAPY | Facility: CANCER CENTER | Age: 58
End: 2023-12-26

## 2023-12-26 LAB
ALBUMIN SERPL ELPH-MCNC: 4.3 G/DL — SIGNIFICANT CHANGE UP (ref 3.3–5)
ALBUMIN SERPL ELPH-MCNC: 4.3 G/DL — SIGNIFICANT CHANGE UP (ref 3.3–5)
ALP SERPL-CCNC: 321 U/L — HIGH (ref 40–120)
ALP SERPL-CCNC: 321 U/L — HIGH (ref 40–120)
ALT FLD-CCNC: 289 U/L — HIGH (ref 10–45)
ALT FLD-CCNC: 289 U/L — HIGH (ref 10–45)
ANION GAP SERPL CALC-SCNC: 12 MMOL/L — SIGNIFICANT CHANGE UP (ref 5–17)
ANION GAP SERPL CALC-SCNC: 12 MMOL/L — SIGNIFICANT CHANGE UP (ref 5–17)
ANISOCYTOSIS BLD QL: SLIGHT — SIGNIFICANT CHANGE UP
ANISOCYTOSIS BLD QL: SLIGHT — SIGNIFICANT CHANGE UP
AST SERPL-CCNC: 187 U/L — HIGH (ref 10–40)
AST SERPL-CCNC: 187 U/L — HIGH (ref 10–40)
BASOPHILS # BLD AUTO: 0 K/UL — SIGNIFICANT CHANGE UP (ref 0–0.2)
BASOPHILS # BLD AUTO: 0 K/UL — SIGNIFICANT CHANGE UP (ref 0–0.2)
BASOPHILS NFR BLD AUTO: 0 % — SIGNIFICANT CHANGE UP (ref 0–2)
BASOPHILS NFR BLD AUTO: 0 % — SIGNIFICANT CHANGE UP (ref 0–2)
BILIRUB SERPL-MCNC: 0.4 MG/DL — SIGNIFICANT CHANGE UP (ref 0.2–1.2)
BILIRUB SERPL-MCNC: 0.4 MG/DL — SIGNIFICANT CHANGE UP (ref 0.2–1.2)
BUN SERPL-MCNC: 8 MG/DL — SIGNIFICANT CHANGE UP (ref 7–23)
BUN SERPL-MCNC: 8 MG/DL — SIGNIFICANT CHANGE UP (ref 7–23)
CALCIUM SERPL-MCNC: 9.4 MG/DL — SIGNIFICANT CHANGE UP (ref 8.4–10.5)
CALCIUM SERPL-MCNC: 9.4 MG/DL — SIGNIFICANT CHANGE UP (ref 8.4–10.5)
CHLORIDE SERPL-SCNC: 103 MMOL/L — SIGNIFICANT CHANGE UP (ref 96–108)
CHLORIDE SERPL-SCNC: 103 MMOL/L — SIGNIFICANT CHANGE UP (ref 96–108)
CO2 SERPL-SCNC: 24 MMOL/L — SIGNIFICANT CHANGE UP (ref 22–31)
CO2 SERPL-SCNC: 24 MMOL/L — SIGNIFICANT CHANGE UP (ref 22–31)
CREAT SERPL-MCNC: 0.69 MG/DL — SIGNIFICANT CHANGE UP (ref 0.5–1.3)
CREAT SERPL-MCNC: 0.69 MG/DL — SIGNIFICANT CHANGE UP (ref 0.5–1.3)
EGFR: 101 ML/MIN/1.73M2 — SIGNIFICANT CHANGE UP
EGFR: 101 ML/MIN/1.73M2 — SIGNIFICANT CHANGE UP
EOSINOPHIL # BLD AUTO: 0.14 K/UL — SIGNIFICANT CHANGE UP (ref 0–0.5)
EOSINOPHIL # BLD AUTO: 0.14 K/UL — SIGNIFICANT CHANGE UP (ref 0–0.5)
EOSINOPHIL NFR BLD AUTO: 1 % — SIGNIFICANT CHANGE UP (ref 0–6)
EOSINOPHIL NFR BLD AUTO: 1 % — SIGNIFICANT CHANGE UP (ref 0–6)
GLUCOSE SERPL-MCNC: 125 MG/DL — HIGH (ref 70–99)
GLUCOSE SERPL-MCNC: 125 MG/DL — HIGH (ref 70–99)
HCT VFR BLD CALC: 37.2 % — SIGNIFICANT CHANGE UP (ref 34.5–45)
HCT VFR BLD CALC: 37.2 % — SIGNIFICANT CHANGE UP (ref 34.5–45)
HGB BLD-MCNC: 12.6 G/DL — SIGNIFICANT CHANGE UP (ref 11.5–15.5)
HGB BLD-MCNC: 12.6 G/DL — SIGNIFICANT CHANGE UP (ref 11.5–15.5)
LG PLATELETS BLD QL AUTO: SLIGHT — SIGNIFICANT CHANGE UP
LG PLATELETS BLD QL AUTO: SLIGHT — SIGNIFICANT CHANGE UP
LYMPHOCYTES # BLD AUTO: 1 K/UL — SIGNIFICANT CHANGE UP (ref 1–3.3)
LYMPHOCYTES # BLD AUTO: 1 K/UL — SIGNIFICANT CHANGE UP (ref 1–3.3)
LYMPHOCYTES # BLD AUTO: 7 % — LOW (ref 13–44)
LYMPHOCYTES # BLD AUTO: 7 % — LOW (ref 13–44)
MAGNESIUM SERPL-MCNC: 2.2 MG/DL — SIGNIFICANT CHANGE UP (ref 1.6–2.6)
MAGNESIUM SERPL-MCNC: 2.2 MG/DL — SIGNIFICANT CHANGE UP (ref 1.6–2.6)
MCHC RBC-ENTMCNC: 29.4 PG — SIGNIFICANT CHANGE UP (ref 27–34)
MCHC RBC-ENTMCNC: 29.4 PG — SIGNIFICANT CHANGE UP (ref 27–34)
MCHC RBC-ENTMCNC: 33.9 GM/DL — SIGNIFICANT CHANGE UP (ref 32–36)
MCHC RBC-ENTMCNC: 33.9 GM/DL — SIGNIFICANT CHANGE UP (ref 32–36)
MCV RBC AUTO: 86.9 FL — SIGNIFICANT CHANGE UP (ref 80–100)
MCV RBC AUTO: 86.9 FL — SIGNIFICANT CHANGE UP (ref 80–100)
METAMYELOCYTES # FLD: 4 % — HIGH (ref 0–0)
METAMYELOCYTES # FLD: 4 % — HIGH (ref 0–0)
MONOCYTES # BLD AUTO: 1 K/UL — HIGH (ref 0–0.9)
MONOCYTES # BLD AUTO: 1 K/UL — HIGH (ref 0–0.9)
MONOCYTES NFR BLD AUTO: 7 % — SIGNIFICANT CHANGE UP (ref 2–14)
MONOCYTES NFR BLD AUTO: 7 % — SIGNIFICANT CHANGE UP (ref 2–14)
MYELOCYTES NFR BLD: 3 % — HIGH (ref 0–0)
MYELOCYTES NFR BLD: 3 % — HIGH (ref 0–0)
NEUTROPHILS # BLD AUTO: 11.14 K/UL — HIGH (ref 1.8–7.4)
NEUTROPHILS # BLD AUTO: 11.14 K/UL — HIGH (ref 1.8–7.4)
NEUTROPHILS NFR BLD AUTO: 78 % — HIGH (ref 43–77)
NEUTROPHILS NFR BLD AUTO: 78 % — HIGH (ref 43–77)
NRBC # BLD: 0 /100 — SIGNIFICANT CHANGE UP (ref 0–0)
NRBC # BLD: 0 /100 — SIGNIFICANT CHANGE UP (ref 0–0)
NRBC # BLD: SIGNIFICANT CHANGE UP /100 WBCS (ref 0–0)
NRBC # BLD: SIGNIFICANT CHANGE UP /100 WBCS (ref 0–0)
OVALOCYTES BLD QL SMEAR: SLIGHT — SIGNIFICANT CHANGE UP
OVALOCYTES BLD QL SMEAR: SLIGHT — SIGNIFICANT CHANGE UP
PHOSPHATE SERPL-MCNC: 3.6 MG/DL — SIGNIFICANT CHANGE UP (ref 2.5–4.5)
PHOSPHATE SERPL-MCNC: 3.6 MG/DL — SIGNIFICANT CHANGE UP (ref 2.5–4.5)
PLAT MORPH BLD: NORMAL — SIGNIFICANT CHANGE UP
PLAT MORPH BLD: NORMAL — SIGNIFICANT CHANGE UP
PLATELET # BLD AUTO: 129 K/UL — LOW (ref 150–400)
PLATELET # BLD AUTO: 129 K/UL — LOW (ref 150–400)
POIKILOCYTOSIS BLD QL AUTO: SLIGHT — SIGNIFICANT CHANGE UP
POIKILOCYTOSIS BLD QL AUTO: SLIGHT — SIGNIFICANT CHANGE UP
POTASSIUM SERPL-MCNC: 3.8 MMOL/L — SIGNIFICANT CHANGE UP (ref 3.5–5.3)
POTASSIUM SERPL-MCNC: 3.8 MMOL/L — SIGNIFICANT CHANGE UP (ref 3.5–5.3)
POTASSIUM SERPL-SCNC: 3.8 MMOL/L — SIGNIFICANT CHANGE UP (ref 3.5–5.3)
POTASSIUM SERPL-SCNC: 3.8 MMOL/L — SIGNIFICANT CHANGE UP (ref 3.5–5.3)
PROT SERPL-MCNC: 6.2 G/DL — SIGNIFICANT CHANGE UP (ref 6–8.3)
PROT SERPL-MCNC: 6.2 G/DL — SIGNIFICANT CHANGE UP (ref 6–8.3)
RBC # BLD: 4.28 M/UL — SIGNIFICANT CHANGE UP (ref 3.8–5.2)
RBC # BLD: 4.28 M/UL — SIGNIFICANT CHANGE UP (ref 3.8–5.2)
RBC # FLD: 15.4 % — HIGH (ref 10.3–14.5)
RBC # FLD: 15.4 % — HIGH (ref 10.3–14.5)
RBC BLD AUTO: NORMAL — SIGNIFICANT CHANGE UP
RBC BLD AUTO: NORMAL — SIGNIFICANT CHANGE UP
SODIUM SERPL-SCNC: 139 MMOL/L — SIGNIFICANT CHANGE UP (ref 135–145)
SODIUM SERPL-SCNC: 139 MMOL/L — SIGNIFICANT CHANGE UP (ref 135–145)
WBC # BLD: 14.28 K/UL — HIGH (ref 3.8–10.5)
WBC # BLD: 14.28 K/UL — HIGH (ref 3.8–10.5)
WBC # FLD AUTO: 14.28 K/UL — HIGH (ref 3.8–10.5)
WBC # FLD AUTO: 14.28 K/UL — HIGH (ref 3.8–10.5)

## 2023-12-27 DIAGNOSIS — R11.2 NAUSEA WITH VOMITING, UNSPECIFIED: ICD-10-CM

## 2023-12-27 DIAGNOSIS — Z51.11 ENCOUNTER FOR ANTINEOPLASTIC CHEMOTHERAPY: ICD-10-CM

## 2023-12-28 ENCOUNTER — APPOINTMENT (OUTPATIENT)
Dept: INFUSION THERAPY | Facility: CANCER CENTER | Age: 58
End: 2023-12-28

## 2024-01-02 ENCOUNTER — APPOINTMENT (OUTPATIENT)
Dept: ULTRASOUND IMAGING | Facility: CLINIC | Age: 59
End: 2024-01-02
Payer: MEDICAID

## 2024-01-02 PROCEDURE — 76705 ECHO EXAM OF ABDOMEN: CPT

## 2024-01-04 ENCOUNTER — RESULT REVIEW (OUTPATIENT)
Age: 59
End: 2024-01-04

## 2024-01-04 ENCOUNTER — APPOINTMENT (OUTPATIENT)
Dept: HEMATOLOGY ONCOLOGY | Facility: CLINIC | Age: 59
End: 2024-01-04

## 2024-01-04 DIAGNOSIS — R79.89 OTHER SPECIFIED ABNORMAL FINDINGS OF BLOOD CHEMISTRY: ICD-10-CM

## 2024-01-04 LAB
ANISOCYTOSIS BLD QL: SLIGHT — SIGNIFICANT CHANGE UP
ANISOCYTOSIS BLD QL: SLIGHT — SIGNIFICANT CHANGE UP
BASOPHILS # BLD AUTO: 0 K/UL — SIGNIFICANT CHANGE UP (ref 0–0.2)
BASOPHILS # BLD AUTO: 0 K/UL — SIGNIFICANT CHANGE UP (ref 0–0.2)
BASOPHILS NFR BLD AUTO: 0 % — SIGNIFICANT CHANGE UP (ref 0–2)
BASOPHILS NFR BLD AUTO: 0 % — SIGNIFICANT CHANGE UP (ref 0–2)
EOSINOPHIL # BLD AUTO: 0 K/UL — SIGNIFICANT CHANGE UP (ref 0–0.5)
EOSINOPHIL # BLD AUTO: 0 K/UL — SIGNIFICANT CHANGE UP (ref 0–0.5)
EOSINOPHIL NFR BLD AUTO: 0 % — SIGNIFICANT CHANGE UP (ref 0–6)
EOSINOPHIL NFR BLD AUTO: 0 % — SIGNIFICANT CHANGE UP (ref 0–6)
HCT VFR BLD CALC: 33.8 % — LOW (ref 34.5–45)
HCT VFR BLD CALC: 33.8 % — LOW (ref 34.5–45)
HGB BLD-MCNC: 11.7 G/DL — SIGNIFICANT CHANGE UP (ref 11.5–15.5)
HGB BLD-MCNC: 11.7 G/DL — SIGNIFICANT CHANGE UP (ref 11.5–15.5)
LG PLATELETS BLD QL AUTO: SLIGHT — SIGNIFICANT CHANGE UP
LG PLATELETS BLD QL AUTO: SLIGHT — SIGNIFICANT CHANGE UP
LYMPHOCYTES # BLD AUTO: 1.37 K/UL — SIGNIFICANT CHANGE UP (ref 1–3.3)
LYMPHOCYTES # BLD AUTO: 1.37 K/UL — SIGNIFICANT CHANGE UP (ref 1–3.3)
LYMPHOCYTES # BLD AUTO: 14 % — SIGNIFICANT CHANGE UP (ref 13–44)
LYMPHOCYTES # BLD AUTO: 14 % — SIGNIFICANT CHANGE UP (ref 13–44)
MCHC RBC-ENTMCNC: 30.2 PG — SIGNIFICANT CHANGE UP (ref 27–34)
MCHC RBC-ENTMCNC: 30.2 PG — SIGNIFICANT CHANGE UP (ref 27–34)
MCHC RBC-ENTMCNC: 34.6 GM/DL — SIGNIFICANT CHANGE UP (ref 32–36)
MCHC RBC-ENTMCNC: 34.6 GM/DL — SIGNIFICANT CHANGE UP (ref 32–36)
MCV RBC AUTO: 87.1 FL — SIGNIFICANT CHANGE UP (ref 80–100)
MCV RBC AUTO: 87.1 FL — SIGNIFICANT CHANGE UP (ref 80–100)
MONOCYTES # BLD AUTO: 1.56 K/UL — HIGH (ref 0–0.9)
MONOCYTES # BLD AUTO: 1.56 K/UL — HIGH (ref 0–0.9)
MONOCYTES NFR BLD AUTO: 16 % — HIGH (ref 2–14)
MONOCYTES NFR BLD AUTO: 16 % — HIGH (ref 2–14)
NEUTROPHILS # BLD AUTO: 6.85 K/UL — SIGNIFICANT CHANGE UP (ref 1.8–7.4)
NEUTROPHILS # BLD AUTO: 6.85 K/UL — SIGNIFICANT CHANGE UP (ref 1.8–7.4)
NEUTROPHILS NFR BLD AUTO: 70 % — SIGNIFICANT CHANGE UP (ref 43–77)
NEUTROPHILS NFR BLD AUTO: 70 % — SIGNIFICANT CHANGE UP (ref 43–77)
NRBC # BLD: 0 /100 WBCS — SIGNIFICANT CHANGE UP (ref 0–0)
NRBC # BLD: 0 /100 WBCS — SIGNIFICANT CHANGE UP (ref 0–0)
NRBC # BLD: SIGNIFICANT CHANGE UP /100 WBCS (ref 0–0)
NRBC # BLD: SIGNIFICANT CHANGE UP /100 WBCS (ref 0–0)
OVALOCYTES BLD QL SMEAR: SLIGHT — SIGNIFICANT CHANGE UP
OVALOCYTES BLD QL SMEAR: SLIGHT — SIGNIFICANT CHANGE UP
PLAT MORPH BLD: NORMAL — SIGNIFICANT CHANGE UP
PLAT MORPH BLD: NORMAL — SIGNIFICANT CHANGE UP
PLATELET # BLD AUTO: 113 K/UL — LOW (ref 150–400)
PLATELET # BLD AUTO: 113 K/UL — LOW (ref 150–400)
POIKILOCYTOSIS BLD QL AUTO: SLIGHT — SIGNIFICANT CHANGE UP
POIKILOCYTOSIS BLD QL AUTO: SLIGHT — SIGNIFICANT CHANGE UP
RBC # BLD: 3.88 M/UL — SIGNIFICANT CHANGE UP (ref 3.8–5.2)
RBC # BLD: 3.88 M/UL — SIGNIFICANT CHANGE UP (ref 3.8–5.2)
RBC # FLD: 15.2 % — HIGH (ref 10.3–14.5)
RBC # FLD: 15.2 % — HIGH (ref 10.3–14.5)
RBC BLD AUTO: NORMAL — SIGNIFICANT CHANGE UP
RBC BLD AUTO: NORMAL — SIGNIFICANT CHANGE UP
WBC # BLD: 9.78 K/UL — SIGNIFICANT CHANGE UP (ref 3.8–10.5)
WBC # BLD: 9.78 K/UL — SIGNIFICANT CHANGE UP (ref 3.8–10.5)
WBC # FLD AUTO: 9.78 K/UL — SIGNIFICANT CHANGE UP (ref 3.8–10.5)
WBC # FLD AUTO: 9.78 K/UL — SIGNIFICANT CHANGE UP (ref 3.8–10.5)

## 2024-01-05 LAB
ALBUMIN SERPL ELPH-MCNC: 4 G/DL
ALP BLD-CCNC: 298 U/L
ALT SERPL-CCNC: 69 U/L
ANION GAP SERPL CALC-SCNC: 10 MMOL/L
AST SERPL-CCNC: 46 U/L
BILIRUB SERPL-MCNC: 0.3 MG/DL
BUN SERPL-MCNC: 8 MG/DL
CALCIUM SERPL-MCNC: 8.8 MG/DL
CHLORIDE SERPL-SCNC: 104 MMOL/L
CO2 SERPL-SCNC: 25 MMOL/L
CREAT SERPL-MCNC: 0.63 MG/DL
EGFR: 103 ML/MIN/1.73M2
GLUCOSE SERPL-MCNC: 136 MG/DL
POTASSIUM SERPL-SCNC: 3.9 MMOL/L
PROT SERPL-MCNC: 6.1 G/DL
SODIUM SERPL-SCNC: 139 MMOL/L

## 2024-01-09 ENCOUNTER — APPOINTMENT (OUTPATIENT)
Age: 59
End: 2024-01-09

## 2024-01-09 ENCOUNTER — RESULT REVIEW (OUTPATIENT)
Age: 59
End: 2024-01-09

## 2024-01-09 LAB
ALBUMIN SERPL ELPH-MCNC: 4.4 G/DL — SIGNIFICANT CHANGE UP (ref 3.3–5)
ALBUMIN SERPL ELPH-MCNC: 4.4 G/DL — SIGNIFICANT CHANGE UP (ref 3.3–5)
ALP SERPL-CCNC: 239 U/L — HIGH (ref 40–120)
ALP SERPL-CCNC: 239 U/L — HIGH (ref 40–120)
ALT FLD-CCNC: 46 U/L — HIGH (ref 10–45)
ALT FLD-CCNC: 46 U/L — HIGH (ref 10–45)
ANION GAP SERPL CALC-SCNC: 13 MMOL/L — SIGNIFICANT CHANGE UP (ref 5–17)
ANION GAP SERPL CALC-SCNC: 13 MMOL/L — SIGNIFICANT CHANGE UP (ref 5–17)
AST SERPL-CCNC: 47 U/L — HIGH (ref 10–40)
AST SERPL-CCNC: 47 U/L — HIGH (ref 10–40)
BASOPHILS # BLD AUTO: 0.06 K/UL — SIGNIFICANT CHANGE UP (ref 0–0.2)
BASOPHILS # BLD AUTO: 0.06 K/UL — SIGNIFICANT CHANGE UP (ref 0–0.2)
BASOPHILS NFR BLD AUTO: 0.6 % — SIGNIFICANT CHANGE UP (ref 0–2)
BASOPHILS NFR BLD AUTO: 0.6 % — SIGNIFICANT CHANGE UP (ref 0–2)
BILIRUB SERPL-MCNC: 0.3 MG/DL — SIGNIFICANT CHANGE UP (ref 0.2–1.2)
BILIRUB SERPL-MCNC: 0.3 MG/DL — SIGNIFICANT CHANGE UP (ref 0.2–1.2)
BUN SERPL-MCNC: 9 MG/DL — SIGNIFICANT CHANGE UP (ref 7–23)
BUN SERPL-MCNC: 9 MG/DL — SIGNIFICANT CHANGE UP (ref 7–23)
CALCIUM SERPL-MCNC: 9.2 MG/DL — SIGNIFICANT CHANGE UP (ref 8.4–10.5)
CALCIUM SERPL-MCNC: 9.2 MG/DL — SIGNIFICANT CHANGE UP (ref 8.4–10.5)
CHLORIDE SERPL-SCNC: 104 MMOL/L — SIGNIFICANT CHANGE UP (ref 96–108)
CHLORIDE SERPL-SCNC: 104 MMOL/L — SIGNIFICANT CHANGE UP (ref 96–108)
CO2 SERPL-SCNC: 22 MMOL/L — SIGNIFICANT CHANGE UP (ref 22–31)
CO2 SERPL-SCNC: 22 MMOL/L — SIGNIFICANT CHANGE UP (ref 22–31)
CREAT SERPL-MCNC: 0.63 MG/DL — SIGNIFICANT CHANGE UP (ref 0.5–1.3)
CREAT SERPL-MCNC: 0.63 MG/DL — SIGNIFICANT CHANGE UP (ref 0.5–1.3)
EGFR: 103 ML/MIN/1.73M2 — SIGNIFICANT CHANGE UP
EGFR: 103 ML/MIN/1.73M2 — SIGNIFICANT CHANGE UP
EOSINOPHIL # BLD AUTO: 0.03 K/UL — SIGNIFICANT CHANGE UP (ref 0–0.5)
EOSINOPHIL # BLD AUTO: 0.03 K/UL — SIGNIFICANT CHANGE UP (ref 0–0.5)
EOSINOPHIL NFR BLD AUTO: 0.3 % — SIGNIFICANT CHANGE UP (ref 0–6)
EOSINOPHIL NFR BLD AUTO: 0.3 % — SIGNIFICANT CHANGE UP (ref 0–6)
GLUCOSE SERPL-MCNC: 101 MG/DL — HIGH (ref 70–99)
GLUCOSE SERPL-MCNC: 101 MG/DL — HIGH (ref 70–99)
HCT VFR BLD CALC: 39.3 % — SIGNIFICANT CHANGE UP (ref 34.5–45)
HCT VFR BLD CALC: 39.3 % — SIGNIFICANT CHANGE UP (ref 34.5–45)
HGB BLD-MCNC: 13.3 G/DL — SIGNIFICANT CHANGE UP (ref 11.5–15.5)
HGB BLD-MCNC: 13.3 G/DL — SIGNIFICANT CHANGE UP (ref 11.5–15.5)
IMM GRANULOCYTES NFR BLD AUTO: 2.7 % — HIGH (ref 0–0.9)
IMM GRANULOCYTES NFR BLD AUTO: 2.7 % — HIGH (ref 0–0.9)
LYMPHOCYTES # BLD AUTO: 1.87 K/UL — SIGNIFICANT CHANGE UP (ref 1–3.3)
LYMPHOCYTES # BLD AUTO: 1.87 K/UL — SIGNIFICANT CHANGE UP (ref 1–3.3)
LYMPHOCYTES # BLD AUTO: 18.7 % — SIGNIFICANT CHANGE UP (ref 13–44)
LYMPHOCYTES # BLD AUTO: 18.7 % — SIGNIFICANT CHANGE UP (ref 13–44)
MAGNESIUM SERPL-MCNC: 2.5 MG/DL — SIGNIFICANT CHANGE UP (ref 1.6–2.6)
MAGNESIUM SERPL-MCNC: 2.5 MG/DL — SIGNIFICANT CHANGE UP (ref 1.6–2.6)
MCHC RBC-ENTMCNC: 29.9 PG — SIGNIFICANT CHANGE UP (ref 27–34)
MCHC RBC-ENTMCNC: 29.9 PG — SIGNIFICANT CHANGE UP (ref 27–34)
MCHC RBC-ENTMCNC: 33.8 GM/DL — SIGNIFICANT CHANGE UP (ref 32–36)
MCHC RBC-ENTMCNC: 33.8 GM/DL — SIGNIFICANT CHANGE UP (ref 32–36)
MCV RBC AUTO: 88.3 FL — SIGNIFICANT CHANGE UP (ref 80–100)
MCV RBC AUTO: 88.3 FL — SIGNIFICANT CHANGE UP (ref 80–100)
MONOCYTES # BLD AUTO: 0.91 K/UL — HIGH (ref 0–0.9)
MONOCYTES # BLD AUTO: 0.91 K/UL — HIGH (ref 0–0.9)
MONOCYTES NFR BLD AUTO: 9.1 % — SIGNIFICANT CHANGE UP (ref 2–14)
MONOCYTES NFR BLD AUTO: 9.1 % — SIGNIFICANT CHANGE UP (ref 2–14)
NEUTROPHILS # BLD AUTO: 6.87 K/UL — SIGNIFICANT CHANGE UP (ref 1.8–7.4)
NEUTROPHILS # BLD AUTO: 6.87 K/UL — SIGNIFICANT CHANGE UP (ref 1.8–7.4)
NEUTROPHILS NFR BLD AUTO: 68.6 % — SIGNIFICANT CHANGE UP (ref 43–77)
NEUTROPHILS NFR BLD AUTO: 68.6 % — SIGNIFICANT CHANGE UP (ref 43–77)
NRBC # BLD: 0 /100 WBCS — SIGNIFICANT CHANGE UP (ref 0–0)
NRBC # BLD: 0 /100 WBCS — SIGNIFICANT CHANGE UP (ref 0–0)
PHOSPHATE SERPL-MCNC: 3.8 MG/DL — SIGNIFICANT CHANGE UP (ref 2.5–4.5)
PHOSPHATE SERPL-MCNC: 3.8 MG/DL — SIGNIFICANT CHANGE UP (ref 2.5–4.5)
PLATELET # BLD AUTO: 123 K/UL — LOW (ref 150–400)
PLATELET # BLD AUTO: 123 K/UL — LOW (ref 150–400)
POTASSIUM SERPL-MCNC: 4.3 MMOL/L — SIGNIFICANT CHANGE UP (ref 3.5–5.3)
POTASSIUM SERPL-MCNC: 4.3 MMOL/L — SIGNIFICANT CHANGE UP (ref 3.5–5.3)
POTASSIUM SERPL-SCNC: 4.3 MMOL/L — SIGNIFICANT CHANGE UP (ref 3.5–5.3)
POTASSIUM SERPL-SCNC: 4.3 MMOL/L — SIGNIFICANT CHANGE UP (ref 3.5–5.3)
PROT SERPL-MCNC: 6.5 G/DL — SIGNIFICANT CHANGE UP (ref 6–8.3)
PROT SERPL-MCNC: 6.5 G/DL — SIGNIFICANT CHANGE UP (ref 6–8.3)
RBC # BLD: 4.45 M/UL — SIGNIFICANT CHANGE UP (ref 3.8–5.2)
RBC # BLD: 4.45 M/UL — SIGNIFICANT CHANGE UP (ref 3.8–5.2)
RBC # FLD: 16 % — HIGH (ref 10.3–14.5)
RBC # FLD: 16 % — HIGH (ref 10.3–14.5)
SODIUM SERPL-SCNC: 139 MMOL/L — SIGNIFICANT CHANGE UP (ref 135–145)
SODIUM SERPL-SCNC: 139 MMOL/L — SIGNIFICANT CHANGE UP (ref 135–145)
WBC # BLD: 10.01 K/UL — SIGNIFICANT CHANGE UP (ref 3.8–10.5)
WBC # BLD: 10.01 K/UL — SIGNIFICANT CHANGE UP (ref 3.8–10.5)
WBC # FLD AUTO: 10.01 K/UL — SIGNIFICANT CHANGE UP (ref 3.8–10.5)
WBC # FLD AUTO: 10.01 K/UL — SIGNIFICANT CHANGE UP (ref 3.8–10.5)

## 2024-01-10 ENCOUNTER — OUTPATIENT (OUTPATIENT)
Dept: OUTPATIENT SERVICES | Facility: HOSPITAL | Age: 59
LOS: 1 days | Discharge: HOME | End: 2024-01-10

## 2024-01-10 DIAGNOSIS — Z98.890 OTHER SPECIFIED POSTPROCEDURAL STATES: Chronic | ICD-10-CM

## 2024-01-10 DIAGNOSIS — C20 MALIGNANT NEOPLASM OF RECTUM: ICD-10-CM

## 2024-01-11 ENCOUNTER — APPOINTMENT (OUTPATIENT)
Age: 59
End: 2024-01-11

## 2024-01-11 ENCOUNTER — APPOINTMENT (OUTPATIENT)
Dept: HEMATOLOGY ONCOLOGY | Facility: CLINIC | Age: 59
End: 2024-01-11
Payer: MEDICAID

## 2024-01-11 PROCEDURE — 99214 OFFICE O/P EST MOD 30 MIN: CPT

## 2024-01-11 PROCEDURE — T1013A: CUSTOM

## 2024-01-11 NOTE — RESULTS/DATA
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  Imaging was negative for metastatic disease. She is now s/p large bowel rectosigmoid excision 10/12/23 w/ Dr. Gutierrez which revealed Infiltrating moderately differentiated adenocarcinoma measuring 3.0 cm, infiltrates into the muscularis propria, 8/26 lymph nodes are positive for metastatic carcinoma.  LVI neg.   pT2N2b, prognostic stage IIIB Given multiple positive lymph nodes and stage III disease would recommend adjuvant chemotherapy with FOLFOX x6 months. In patients w/ high risk stage III colorectal cancer, 3 months of FOLFOX was inferior to 6 months of FOLFOX.  Risks, benefits and side effects of FOLFOX were discussed in detail at today's visit.  We had an informed discussion regarding the patients pathology, prognosis, and treatment options for stage III colorectal cancer. We discussed therapy with FOLFOX: 5- mg/m2 IV bolus on D1, 5-FU 1200 mg/m2 CI on D1 and D2 (total 2400mg/m2 over 46-48 hours) continuous infusion and oxaliplatin 85 mg/m2 on D1, of every 14 day cycle x 12 cycles. Side effects of FOLFOX were discussed including moderate emesis risk, myelosuppression, diarrhea, neuropathy, Qtc prolongation, neurotoxicity, hand-foot syndrome, hair thinning/alopecia, hypersensitivity reaction. A lower starting dose of oxaliplatin may be needed for severe renal impairment. A lower starting dose of 5FU may be needed for patients with liver impairment. Severe diarrhea, mucositis, and myelosuppression after FU should prompt evaluation for DPD deficiency. Standard antiemetic prophylaxis includes fosaprepitant, palonosetron, and dexamethasone. Received D1C1 FOLFOX 11/7/23 w/ good tolerance. Mild erythema of her hands and cold sensitivity, but denies neuropathy. ONPRO added for neutropenia.  Completed C5 this week, plan for 12 cycles total.  Can consider future screening with signatera ctDNA q 3-6 months.

## 2024-01-11 NOTE — REASON FOR VISIT
[Follow-Up Visit] : a follow-up [[employer] ] : provided by [employer]   [Time Spent: ____ minutes] : Total time spent using  services: [unfilled] minutes. The patient's primary language is not English thus required  services. [FreeTextEntry2] : Colorectal cancer [Interpreters_IDNumber] : [id number] [Interpreters_FullName] : Da  [TWNoteComboBox1] : Cook Islander

## 2024-01-11 NOTE — PHYSICAL EXAM
[Fully active, able to carry on all pre-disease performance without restriction] : Status 0 - Fully active, able to carry on all pre-disease performance without restriction [Normal] : affect appropriate [de-identified] : well appearing female, NAD, pleasant  [de-identified] : s/p colostomy placement, healing well, nontender

## 2024-01-11 NOTE — HISTORY OF PRESENT ILLNESS
[de-identified] : Referred by: Dr. Alphonso De León  PCP: Dr. Crump Here today w/ her daughter Crystal Jackson (daughter) -  [phone]  Cancer Summary:  DIAGNOSIS: Rectosigmoid cancer  PROCEDURE AND DATE: large bowel resection/rectosigmoid excision w/ Dr. De León on 10/12/23 PATHOLOGY:  infiltrating mod diff adenocarcinoma, measuring 3cm which infiltrates the muscularis propria. 8/26 LN+ STAGE: pT2N2b, prognostic stage IIIB POSTOPERATIVE TREATMENT: Chemotherapy:  C1 FOLFOX on 11/7/23 C2 FOLFOX 11/29/23 C3 FOLFOX  12/12/23 C4 FOLFOX 12/26/23 C5 FOLFOX 1/9/24 Radiation: n/a STATUS: FLORIN    Ms. Valdse presented at age 57 in October 2023 for evaluation of rectosigmoid cancer.  The patient has a medical history of pre-diabetes.  Surgical history: recent large bowel excision with colostomy placement.  Jess was undergoing evaluation for uterine prolapse- recommended her for screening colonoscopy during that workup which revealed a mass in the rectosigmoid colon. Colonoscopy was performed on 9/11/23 by Dr. De León- biopsy was performed which revealed severely ulcerated superficial fragments of adenocarcinoma, moderately differentiated, in a background of tubular adenoma with high-grade dysplasia, microsatellite stable.  Imaging with pelvic MRI on September 27, 2023 revealed a high rectal cancer, stage T1-T2, no concerning lymph nodes were seen at that time.  CT chest abdomen pelvis revealed no evidence of metastatic disease.  She was seen initially by Dr. Jatin Pedro at [facility]. She ultimately underwent large bowel resection/rectosigmoid excision w/ Dr. De León on 10/12/23 which revealed infiltrating mod diff adenocarcinoma, measuring 3cm which infiltrates the muscularis propria.  8 out of 26 lymph nodes were positive for metastatic carcinoma. She was referred today for discussion of adjuvant therapy.  At today's visit she is here with her daughter.  They are Bel[place]ian-speaking.  She denies recent headaches, visual changes, balance issues, CP, cough, SOB, n/v/d, constipation, unintentional weight loss or new bone or back pain.  She is healing well from surgery.  She does report that they are running out colostomy bags.   Imaging:  MR pelvis 9/27/23- high rectal cancer, stage T1/T2, N0 CT C/A/P 9/27/23- no evidence of metastatic disease in the CAP  Path: Sigmoid colon mass biopsy 9/11/23 Large bowel rectosigmoid excision 10/12/23- Infiltrating moderately differentiated adenocarcinoma measuring 3.0 cm, infiltrates into the muscularis propria -8/26 lymph nodes are positive for metastatic carcinoma.  LVI neg.  2.  Large bowel, proximal anastomosis, excision: Negative for malignancy 3. Large bowel, distal anastomosis, excision: Negative for malignancy  pT2N2b, prognostic stage IIIB  Genetics: not done    HCM: - COVID vaccination:  Moderna x2, no boosters - Flu shots - 10/12/23 - Colonoscopy:  9/11/23 -> rectosigmoid mass - Gyn:  2023 - no hx abnormal Paps - Mammo:  2023 - wnl - Lung cancer screen:  n/a - DEXA:  none prior   SH: - Occupation:  not currently working  - Living situation:  Lives in [place] with her son and family - Smoking/etoh/illicits:  Never smoker, rare etoh, no illicits  - Exercise: walking   FH: - no known family history of cancer    [de-identified] : Presents for follow up on 1/11/24 for rectosigmoid cancer. Accompanied by her son in law. Received D1C1 FOLFOX on 11/7/23  S/p C5 FOLFOX this week. She is generally  tolerating chemotherapy very well. She reports some mild erythema on her hands, she is using a moisturizing cream. She has sensitivity to cold in her hands. She denies fevers, chills, CP, SOB, nausea, vomiting, diarrhea, mouth sores, LE swelling or neuropathy at this time. Her ostomy remains in place.   Laboratory studies reviewed at today's visit and notable for: WBC 10.01, Hb 13.3, plt 123

## 2024-01-16 ENCOUNTER — APPOINTMENT (OUTPATIENT)
Dept: COLORECTAL SURGERY | Facility: CLINIC | Age: 59
End: 2024-01-16
Payer: MEDICAID

## 2024-01-16 VITALS — BODY MASS INDEX: 20.8 KG/M2 | WEIGHT: 113 LBS | HEIGHT: 61.81 IN

## 2024-01-16 PROCEDURE — 99214 OFFICE O/P EST MOD 30 MIN: CPT

## 2024-01-16 NOTE — PHYSICAL EXAM
[Abdomen Masses] : No abdominal masses [Abdomen Tenderness] : ~T No ~M abdominal tenderness [Normal Breath Sounds] : Normal breath sounds [Normal Heart Sounds] : normal heart sounds [Alert] : alert [Oriented to Person] : oriented to person [Oriented to Place] : oriented to place [Oriented to Time] : oriented to time [Calm] : calm [de-identified] : Ileostomy right lower quadrant [de-identified] : Looks well in no distress, of stated age.

## 2024-01-16 NOTE — HISTORY OF PRESENT ILLNESS
[FreeTextEntry1] : 58-year-old female with stage III rectosigmoid cancer.  Currently with ileostomy and undergoing chemotherapy doing very well has gained weight is feeling much better.

## 2024-01-16 NOTE — ASSESSMENT
[FreeTextEntry1] : 58-year-old female with stage III rectosigmoid cancer currently on chemotherapy.  Recommend continuation and completion of chemotherapy will then repeat a CAT scan of abdomen pelvis p.o. IV and rectal contrast make plans for ileostomy reversal.  Risk and benefits of the surgery have been discussed which include bleeding, infection, sepsis, multiorgan failure, inadvertent injury including hollow viscus, solid organ, ureter neurovascular and neurological structures, DVT PE, heart attack, stroke, hernias, recurrence of tumor and death.

## 2024-01-23 ENCOUNTER — RESULT REVIEW (OUTPATIENT)
Age: 59
End: 2024-01-23

## 2024-01-23 ENCOUNTER — APPOINTMENT (OUTPATIENT)
Age: 59
End: 2024-01-23

## 2024-01-23 LAB
ALBUMIN SERPL ELPH-MCNC: 4.4 G/DL — SIGNIFICANT CHANGE UP (ref 3.3–5)
ALP SERPL-CCNC: 309 U/L — HIGH (ref 40–120)
ALT FLD-CCNC: 91 U/L — HIGH (ref 10–45)
ANION GAP SERPL CALC-SCNC: 13 MMOL/L — SIGNIFICANT CHANGE UP (ref 5–17)
ANISOCYTOSIS BLD QL: SIGNIFICANT CHANGE UP
AST SERPL-CCNC: 93 U/L — HIGH (ref 10–40)
BASOPHILS # BLD AUTO: 0 K/UL — SIGNIFICANT CHANGE UP (ref 0–0.2)
BASOPHILS NFR BLD AUTO: 0 % — SIGNIFICANT CHANGE UP (ref 0–2)
BILIRUB SERPL-MCNC: 0.4 MG/DL — SIGNIFICANT CHANGE UP (ref 0.2–1.2)
BUN SERPL-MCNC: 7 MG/DL — SIGNIFICANT CHANGE UP (ref 7–23)
CALCIUM SERPL-MCNC: 10.1 MG/DL — SIGNIFICANT CHANGE UP (ref 8.4–10.5)
CHLORIDE SERPL-SCNC: 104 MMOL/L — SIGNIFICANT CHANGE UP (ref 96–108)
CO2 SERPL-SCNC: 22 MMOL/L — SIGNIFICANT CHANGE UP (ref 22–31)
CREAT SERPL-MCNC: 0.68 MG/DL — SIGNIFICANT CHANGE UP (ref 0.5–1.3)
DOHLE BOD BLD QL SMEAR: PRESENT — SIGNIFICANT CHANGE UP
EGFR: 102 ML/MIN/1.73M2 — SIGNIFICANT CHANGE UP
EOSINOPHIL # BLD AUTO: 0.26 K/UL — SIGNIFICANT CHANGE UP (ref 0–0.5)
EOSINOPHIL NFR BLD AUTO: 3 % — SIGNIFICANT CHANGE UP (ref 0–6)
GLUCOSE SERPL-MCNC: 116 MG/DL — HIGH (ref 70–99)
HCT VFR BLD CALC: 39.7 % — SIGNIFICANT CHANGE UP (ref 34.5–45)
HGB BLD-MCNC: 13.5 G/DL — SIGNIFICANT CHANGE UP (ref 11.5–15.5)
LG PLATELETS BLD QL AUTO: SLIGHT — SIGNIFICANT CHANGE UP
LYMPHOCYTES # BLD AUTO: 0.77 K/UL — LOW (ref 1–3.3)
LYMPHOCYTES # BLD AUTO: 9 % — LOW (ref 13–44)
MAGNESIUM SERPL-MCNC: 2.3 MG/DL — SIGNIFICANT CHANGE UP (ref 1.6–2.6)
MCHC RBC-ENTMCNC: 29.9 PG — SIGNIFICANT CHANGE UP (ref 27–34)
MCHC RBC-ENTMCNC: 34 GM/DL — SIGNIFICANT CHANGE UP (ref 32–36)
MCV RBC AUTO: 88 FL — SIGNIFICANT CHANGE UP (ref 80–100)
METAMYELOCYTES # FLD: 3 % — HIGH (ref 0–0)
MICROCYTES BLD QL: SLIGHT — SIGNIFICANT CHANGE UP
MONOCYTES # BLD AUTO: 0.6 K/UL — SIGNIFICANT CHANGE UP (ref 0–0.9)
MONOCYTES NFR BLD AUTO: 7 % — SIGNIFICANT CHANGE UP (ref 2–14)
MYELOCYTES NFR BLD: 3 % — HIGH (ref 0–0)
NEUTROPHILS # BLD AUTO: 5.3 K/UL — SIGNIFICANT CHANGE UP (ref 1.8–7.4)
NEUTROPHILS NFR BLD AUTO: 55 % — SIGNIFICANT CHANGE UP (ref 43–77)
NEUTS BAND # BLD: 7 % — SIGNIFICANT CHANGE UP (ref 0–8)
NRBC # BLD: 0 /100 WBCS — SIGNIFICANT CHANGE UP (ref 0–0)
NRBC # BLD: SIGNIFICANT CHANGE UP /100 WBCS (ref 0–0)
PHOSPHATE SERPL-MCNC: 4 MG/DL — SIGNIFICANT CHANGE UP (ref 2.5–4.5)
PLAT MORPH BLD: NORMAL — SIGNIFICANT CHANGE UP
PLATELET # BLD AUTO: 78 K/UL — LOW (ref 150–400)
POIKILOCYTOSIS BLD QL AUTO: SLIGHT — SIGNIFICANT CHANGE UP
POLYCHROMASIA BLD QL SMEAR: SLIGHT — SIGNIFICANT CHANGE UP
POTASSIUM SERPL-MCNC: 4 MMOL/L — SIGNIFICANT CHANGE UP (ref 3.5–5.3)
POTASSIUM SERPL-SCNC: 4 MMOL/L — SIGNIFICANT CHANGE UP (ref 3.5–5.3)
PROT SERPL-MCNC: 7.3 G/DL — SIGNIFICANT CHANGE UP (ref 6–8.3)
RBC # BLD: 4.51 M/UL — SIGNIFICANT CHANGE UP (ref 3.8–5.2)
RBC # FLD: 16.9 % — HIGH (ref 10.3–14.5)
RBC BLD AUTO: SIGNIFICANT CHANGE UP
SODIUM SERPL-SCNC: 138 MMOL/L — SIGNIFICANT CHANGE UP (ref 135–145)
VARIANT LYMPHS # BLD: 13 % — HIGH (ref 0–6)
WBC # BLD: 8.55 K/UL — SIGNIFICANT CHANGE UP (ref 3.8–10.5)
WBC # FLD AUTO: 8.55 K/UL — SIGNIFICANT CHANGE UP (ref 3.8–10.5)

## 2024-01-30 ENCOUNTER — RESULT REVIEW (OUTPATIENT)
Age: 59
End: 2024-01-30

## 2024-01-30 ENCOUNTER — APPOINTMENT (OUTPATIENT)
Age: 59
End: 2024-01-30

## 2024-01-30 LAB
BASOPHILS # BLD AUTO: 0.03 K/UL — SIGNIFICANT CHANGE UP (ref 0–0.2)
BASOPHILS NFR BLD AUTO: 0.6 % — SIGNIFICANT CHANGE UP (ref 0–2)
EOSINOPHIL # BLD AUTO: 0.02 K/UL — SIGNIFICANT CHANGE UP (ref 0–0.5)
EOSINOPHIL NFR BLD AUTO: 0.4 % — SIGNIFICANT CHANGE UP (ref 0–6)
HCT VFR BLD CALC: 37.2 % — SIGNIFICANT CHANGE UP (ref 34.5–45)
HGB BLD-MCNC: 12.5 G/DL — SIGNIFICANT CHANGE UP (ref 11.5–15.5)
IMM GRANULOCYTES NFR BLD AUTO: 0.4 % — SIGNIFICANT CHANGE UP (ref 0–0.9)
LYMPHOCYTES # BLD AUTO: 1.2 K/UL — SIGNIFICANT CHANGE UP (ref 1–3.3)
LYMPHOCYTES # BLD AUTO: 23.4 % — SIGNIFICANT CHANGE UP (ref 13–44)
MCHC RBC-ENTMCNC: 30.6 PG — SIGNIFICANT CHANGE UP (ref 27–34)
MCHC RBC-ENTMCNC: 33.6 GM/DL — SIGNIFICANT CHANGE UP (ref 32–36)
MCV RBC AUTO: 91.2 FL — SIGNIFICANT CHANGE UP (ref 80–100)
MONOCYTES # BLD AUTO: 0.52 K/UL — SIGNIFICANT CHANGE UP (ref 0–0.9)
MONOCYTES NFR BLD AUTO: 10.2 % — SIGNIFICANT CHANGE UP (ref 2–14)
NEUTROPHILS # BLD AUTO: 3.33 K/UL — SIGNIFICANT CHANGE UP (ref 1.8–7.4)
NEUTROPHILS NFR BLD AUTO: 65 % — SIGNIFICANT CHANGE UP (ref 43–77)
NRBC # BLD: 0 /100 WBCS — SIGNIFICANT CHANGE UP (ref 0–0)
PLATELET # BLD AUTO: 183 K/UL — SIGNIFICANT CHANGE UP (ref 150–400)
RBC # BLD: 4.08 M/UL — SIGNIFICANT CHANGE UP (ref 3.8–5.2)
RBC # FLD: 16.9 % — HIGH (ref 10.3–14.5)
WBC # BLD: 5.12 K/UL — SIGNIFICANT CHANGE UP (ref 3.8–10.5)
WBC # FLD AUTO: 5.12 K/UL — SIGNIFICANT CHANGE UP (ref 3.8–10.5)

## 2024-01-31 LAB
ALBUMIN SERPL ELPH-MCNC: 4 G/DL — SIGNIFICANT CHANGE UP (ref 3.3–5)
ALP SERPL-CCNC: 202 U/L — HIGH (ref 40–120)
ALT FLD-CCNC: 53 U/L — HIGH (ref 10–45)
ANION GAP SERPL CALC-SCNC: 10 MMOL/L — SIGNIFICANT CHANGE UP (ref 5–17)
AST SERPL-CCNC: 53 U/L — HIGH (ref 10–40)
BILIRUB SERPL-MCNC: 0.3 MG/DL — SIGNIFICANT CHANGE UP (ref 0.2–1.2)
BUN SERPL-MCNC: 13 MG/DL — SIGNIFICANT CHANGE UP (ref 7–23)
CALCIUM SERPL-MCNC: 9.2 MG/DL — SIGNIFICANT CHANGE UP (ref 8.4–10.5)
CHLORIDE SERPL-SCNC: 106 MMOL/L — SIGNIFICANT CHANGE UP (ref 96–108)
CO2 SERPL-SCNC: 21 MMOL/L — LOW (ref 22–31)
CREAT SERPL-MCNC: 0.57 MG/DL — SIGNIFICANT CHANGE UP (ref 0.5–1.3)
EGFR: 106 ML/MIN/1.73M2 — SIGNIFICANT CHANGE UP
GLUCOSE SERPL-MCNC: 116 MG/DL — HIGH (ref 70–99)
MAGNESIUM SERPL-MCNC: 2.1 MG/DL — SIGNIFICANT CHANGE UP (ref 1.6–2.6)
PHOSPHATE SERPL-MCNC: 4.3 MG/DL — SIGNIFICANT CHANGE UP (ref 2.5–4.5)
POTASSIUM SERPL-MCNC: 4.2 MMOL/L — SIGNIFICANT CHANGE UP (ref 3.5–5.3)
POTASSIUM SERPL-SCNC: 4.2 MMOL/L — SIGNIFICANT CHANGE UP (ref 3.5–5.3)
PROT SERPL-MCNC: 6.8 G/DL — SIGNIFICANT CHANGE UP (ref 6–8.3)
SODIUM SERPL-SCNC: 137 MMOL/L — SIGNIFICANT CHANGE UP (ref 135–145)

## 2024-02-01 ENCOUNTER — APPOINTMENT (OUTPATIENT)
Dept: HEMATOLOGY ONCOLOGY | Facility: CLINIC | Age: 59
End: 2024-02-01
Payer: COMMERCIAL

## 2024-02-01 ENCOUNTER — APPOINTMENT (OUTPATIENT)
Age: 59
End: 2024-02-01

## 2024-02-01 VITALS
WEIGHT: 115 LBS | BODY MASS INDEX: 21.16 KG/M2 | SYSTOLIC BLOOD PRESSURE: 117 MMHG | TEMPERATURE: 97.8 F | OXYGEN SATURATION: 98 % | DIASTOLIC BLOOD PRESSURE: 68 MMHG | HEART RATE: 69 BPM

## 2024-02-01 PROCEDURE — 84100 ASSAY OF PHOSPHORUS: CPT

## 2024-02-01 PROCEDURE — 96411 CHEMO IV PUSH ADDL DRUG: CPT

## 2024-02-01 PROCEDURE — 96377 APPLICATON ON-BODY INJECTOR: CPT

## 2024-02-01 PROCEDURE — 96415 CHEMO IV INFUSION ADDL HR: CPT

## 2024-02-01 PROCEDURE — 96375 TX/PRO/DX INJ NEW DRUG ADDON: CPT

## 2024-02-01 PROCEDURE — 96416 CHEMO PROLONG INFUSE W/PUMP: CPT

## 2024-02-01 PROCEDURE — 96368 THER/DIAG CONCURRENT INF: CPT

## 2024-02-01 PROCEDURE — 85027 COMPLETE CBC AUTOMATED: CPT

## 2024-02-01 PROCEDURE — 99214 OFFICE O/P EST MOD 30 MIN: CPT

## 2024-02-01 PROCEDURE — 80053 COMPREHEN METABOLIC PANEL: CPT

## 2024-02-01 PROCEDURE — 83735 ASSAY OF MAGNESIUM: CPT

## 2024-02-01 PROCEDURE — 96413 CHEMO IV INFUSION 1 HR: CPT

## 2024-02-01 NOTE — HISTORY OF PRESENT ILLNESS
[de-identified] : Referred by: Dr. Alphonso De León  PCP: Dr. Crump Here today w/ her daughter Crystal Jackson (daughter) -  [phone]  Cancer Summary:  DIAGNOSIS: Rectosigmoid cancer  PROCEDURE AND DATE: large bowel resection/rectosigmoid excision w/ Dr. De León on 10/12/23 PATHOLOGY:  infiltrating mod diff adenocarcinoma, measuring 3cm which infiltrates the muscularis propria. 8/26 LN+ STAGE: pT2N2b, prognostic stage IIIB POSTOPERATIVE TREATMENT: Chemotherapy:  C1 FOLFOX on 11/7/23 C2 FOLFOX 11/29/23 C3 FOLFOX  12/12/23 C4 FOLFOX 12/26/23 C5 FOLFOX 1/9/24 C6 FOLFOX 1/30/24 Radiation: n/a STATUS: FLORIN    Ms. Valdes presented at age 57 in October 2023 for evaluation of rectosigmoid cancer.  The patient has a medical history of pre-diabetes.  Surgical history: recent large bowel excision with colostomy placement.  Jess was undergoing evaluation for uterine prolapse- recommended her for screening colonoscopy during that workup which revealed a mass in the rectosigmoid colon. Colonoscopy was performed on 9/11/23 by Dr. De León- biopsy was performed which revealed severely ulcerated superficial fragments of adenocarcinoma, moderately differentiated, in a background of tubular adenoma with high-grade dysplasia, microsatellite stable.  Imaging with pelvic MRI on September 27, 2023 revealed a high rectal cancer, stage T1-T2, no concerning lymph nodes were seen at that time.  CT chest abdomen pelvis revealed no evidence of metastatic disease.  She was seen initially by Dr. Jatin Pedro at [facility]. She ultimately underwent large bowel resection/rectosigmoid excision w/ Dr. De León on 10/12/23 which revealed infiltrating mod diff adenocarcinoma, measuring 3cm which infiltrates the muscularis propria.  8 out of 26 lymph nodes were positive for metastatic carcinoma. She was referred today for discussion of adjuvant therapy.  She presented with her daughter.  They are German-speaking.  She denied recent headaches, visual changes, balance issues, CP, cough, SOB, n/v/d, constipation, unintentional weight loss or new bone or back pain.  Imaging:  MR pelvis 9/27/23- high rectal cancer, stage T1/T2, N0 CT C/A/P 9/27/23- no evidence of metastatic disease in the CAP  Path: Sigmoid colon mass biopsy 9/11/23 Large bowel rectosigmoid excision 10/12/23- Infiltrating moderately differentiated adenocarcinoma measuring 3.0 cm, infiltrates into the muscularis propria -8/26 lymph nodes are positive for metastatic carcinoma.  LVI neg.  2.  Large bowel, proximal anastomosis, excision: Negative for malignancy 3. Large bowel, distal anastomosis, excision: Negative for malignancy  pT2N2b, prognostic stage IIIB  Genetics: not done    HCM: - COVID vaccination:  Moderna x2, no boosters - Flu shots - 10/12/23 - Colonoscopy:  9/11/23 -> rectosigmoid mass - Gyn:  2023 - no hx abnormal Paps - Mammo:  2023 - wnl - Lung cancer screen:  n/a - DEXA:  none prior   SH: - Occupation:  not currently working  - Living situation:  Lives in [place] with her son and family - Smoking/etoh/illicits:  Never smoker, rare etoh, no illicits  - Exercise: walking   FH: - no known family history of cancer    [de-identified] : Presents for follow up on 2/1/24 for rectosigmoid cancer. Accompanied by her daughter. Received D1C1 FOLFOX on 11/7/23  Assisted by  Xavier (#[id number])  S/p C6 FOLFOX this week. At today's visit she reports feeling generally well.  Appetite and energy level stable.  No n/v.  Ostomy is functioning well.  She reports some mild erythema on her hands and face, she is using a moisturizing cream. She has sensitivity to cold in her hands. She denies fevers, chills, CP, SOB, nausea, vomiting, diarrhea, mouth sores, LE swelling or neuropathy at this time. Her ostomy remains in place.   Laboratory studies reviewed at today's visit and notable for: WBC 5.12, Hb 12.5, plt 183

## 2024-02-01 NOTE — PHYSICAL EXAM
[Fully active, able to carry on all pre-disease performance without restriction] : Status 0 - Fully active, able to carry on all pre-disease performance without restriction [Normal] : affect appropriate [de-identified] : well appearing female, NAD, pleasant, mild flushing of face/hands  [de-identified] : s/p colostomy placement, well healed, nontender

## 2024-02-01 NOTE — RESULTS/DATA
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  Imaging was negative for metastatic disease. She is now s/p large bowel rectosigmoid excision 10/12/23 w/ Dr. Gutierrez which revealed Infiltrating moderately differentiated adenocarcinoma measuring 3.0 cm, infiltrates into the muscularis propria, 8/26 lymph nodes are positive for metastatic carcinoma.  LVI neg.   pT2N2b, prognostic stage IIIB Given multiple positive lymph nodes and stage III disease would recommend adjuvant chemotherapy with FOLFOX x6 months. In patients w/ high risk stage III colorectal cancer, 3 months of FOLFOX was inferior to 6 months of FOLFOX.  Risks, benefits and side effects of FOLFOX were discussed in detail at today's visit.  We had an informed discussion regarding the patients pathology, prognosis, and treatment options for stage III colorectal cancer. We discussed therapy with FOLFOX: 5- mg/m2 IV bolus on D1, 5-FU 1200 mg/m2 CI on D1 and D2 (total 2400mg/m2 over 46-48 hours) continuous infusion and oxaliplatin 85 mg/m2 on D1, of every 14 day cycle x 12 cycles. Side effects of FOLFOX were discussed including moderate emesis risk, myelosuppression, diarrhea, neuropathy, Qtc prolongation, neurotoxicity, hand-foot syndrome, hair thinning/alopecia, hypersensitivity reaction. A lower starting dose of oxaliplatin may be needed for severe renal impairment. A lower starting dose of 5FU may be needed for patients with liver impairment. Severe diarrhea, mucositis, and myelosuppression after FU should prompt evaluation for DPD deficiency. Standard antiemetic prophylaxis includes fosaprepitant, palonosetron, and dexamethasone. Received D1C1 FOLFOX 11/7/23 w/ good tolerance. ONPRO added for neutropenia.  Completed C6 this week, plan for 12 cycles total.  Can consider future screening with signatera ctDNA q 3-6 months.  Schedule up upcoming visits: f/u visit w/ DAVID Asencio on 3/12/24 (C9) Then subsequent visit w/ Dr. Ugarte 4-6 weeks later (w/ C11 or C12)

## 2024-02-01 NOTE — REASON FOR VISIT
[Follow-Up Visit] : a follow-up [[employer] ] : provided by [employer]   [FreeTextEntry2] : Colorectal cancer [TWNoteComboBox1] : Canadian

## 2024-02-01 NOTE — END OF VISIT
[Time Spent: ___ minutes] : I have spent [unfilled] minutes of time on the encounter.
Side rails/Call bell/Position of comfort

## 2024-02-08 ENCOUNTER — OUTPATIENT (OUTPATIENT)
Dept: OUTPATIENT SERVICES | Facility: HOSPITAL | Age: 59
LOS: 1 days | End: 2024-02-08
Payer: COMMERCIAL

## 2024-02-08 DIAGNOSIS — C20 MALIGNANT NEOPLASM OF RECTUM: ICD-10-CM

## 2024-02-08 DIAGNOSIS — Z98.890 OTHER SPECIFIED POSTPROCEDURAL STATES: Chronic | ICD-10-CM

## 2024-02-08 DIAGNOSIS — Z51.89 ENCOUNTER FOR OTHER SPECIFIED AFTERCARE: ICD-10-CM

## 2024-02-13 ENCOUNTER — APPOINTMENT (OUTPATIENT)
Dept: INFUSION THERAPY | Facility: CANCER CENTER | Age: 59
End: 2024-02-13

## 2024-02-13 ENCOUNTER — RESULT REVIEW (OUTPATIENT)
Age: 59
End: 2024-02-13

## 2024-02-13 LAB
ALBUMIN SERPL ELPH-MCNC: 4.1 G/DL — SIGNIFICANT CHANGE UP (ref 3.3–5)
ALP SERPL-CCNC: 243 U/L — HIGH (ref 40–120)
ALT FLD-CCNC: 43 U/L — SIGNIFICANT CHANGE UP (ref 10–45)
ANION GAP SERPL CALC-SCNC: 10 MMOL/L — SIGNIFICANT CHANGE UP (ref 5–17)
AST SERPL-CCNC: 44 U/L — HIGH (ref 10–40)
BASOPHILS # BLD AUTO: 0.03 K/UL — SIGNIFICANT CHANGE UP (ref 0–0.2)
BASOPHILS NFR BLD AUTO: 0.5 % — SIGNIFICANT CHANGE UP (ref 0–2)
BILIRUB SERPL-MCNC: 0.4 MG/DL — SIGNIFICANT CHANGE UP (ref 0.2–1.2)
BUN SERPL-MCNC: 9 MG/DL — SIGNIFICANT CHANGE UP (ref 7–23)
CALCIUM SERPL-MCNC: 9.3 MG/DL — SIGNIFICANT CHANGE UP (ref 8.4–10.5)
CHLORIDE SERPL-SCNC: 108 MMOL/L — SIGNIFICANT CHANGE UP (ref 96–108)
CO2 SERPL-SCNC: 22 MMOL/L — SIGNIFICANT CHANGE UP (ref 22–31)
CREAT SERPL-MCNC: 0.59 MG/DL — SIGNIFICANT CHANGE UP (ref 0.5–1.3)
EGFR: 105 ML/MIN/1.73M2 — SIGNIFICANT CHANGE UP
EOSINOPHIL # BLD AUTO: 0.07 K/UL — SIGNIFICANT CHANGE UP (ref 0–0.5)
EOSINOPHIL NFR BLD AUTO: 1.3 % — SIGNIFICANT CHANGE UP (ref 0–6)
GLUCOSE SERPL-MCNC: 151 MG/DL — HIGH (ref 70–99)
HCT VFR BLD CALC: 37.3 % — SIGNIFICANT CHANGE UP (ref 34.5–45)
HGB BLD-MCNC: 12.5 G/DL — SIGNIFICANT CHANGE UP (ref 11.5–15.5)
IMM GRANULOCYTES NFR BLD AUTO: 0.7 % — SIGNIFICANT CHANGE UP (ref 0–0.9)
LYMPHOCYTES # BLD AUTO: 1.33 K/UL — SIGNIFICANT CHANGE UP (ref 1–3.3)
LYMPHOCYTES # BLD AUTO: 24.4 % — SIGNIFICANT CHANGE UP (ref 13–44)
MAGNESIUM SERPL-MCNC: 2.2 MG/DL — SIGNIFICANT CHANGE UP (ref 1.6–2.6)
MCHC RBC-ENTMCNC: 30.8 PG — SIGNIFICANT CHANGE UP (ref 27–34)
MCHC RBC-ENTMCNC: 33.5 GM/DL — SIGNIFICANT CHANGE UP (ref 32–36)
MCV RBC AUTO: 91.9 FL — SIGNIFICANT CHANGE UP (ref 80–100)
MONOCYTES # BLD AUTO: 0.49 K/UL — SIGNIFICANT CHANGE UP (ref 0–0.9)
MONOCYTES NFR BLD AUTO: 9 % — SIGNIFICANT CHANGE UP (ref 2–14)
NEUTROPHILS # BLD AUTO: 3.5 K/UL — SIGNIFICANT CHANGE UP (ref 1.8–7.4)
NEUTROPHILS NFR BLD AUTO: 64.1 % — SIGNIFICANT CHANGE UP (ref 43–77)
NRBC # BLD: 0 /100 WBCS — SIGNIFICANT CHANGE UP (ref 0–0)
PHOSPHATE SERPL-MCNC: 3.6 MG/DL — SIGNIFICANT CHANGE UP (ref 2.5–4.5)
PLATELET # BLD AUTO: 101 K/UL — LOW (ref 150–400)
POTASSIUM SERPL-MCNC: 4.2 MMOL/L — SIGNIFICANT CHANGE UP (ref 3.5–5.3)
POTASSIUM SERPL-SCNC: 4.2 MMOL/L — SIGNIFICANT CHANGE UP (ref 3.5–5.3)
PROT SERPL-MCNC: 6.1 G/DL — SIGNIFICANT CHANGE UP (ref 6–8.3)
RBC # BLD: 4.06 M/UL — SIGNIFICANT CHANGE UP (ref 3.8–5.2)
RBC # FLD: 16.9 % — HIGH (ref 10.3–14.5)
SODIUM SERPL-SCNC: 140 MMOL/L — SIGNIFICANT CHANGE UP (ref 135–145)
WBC # BLD: 5.46 K/UL — SIGNIFICANT CHANGE UP (ref 3.8–10.5)
WBC # FLD AUTO: 5.46 K/UL — SIGNIFICANT CHANGE UP (ref 3.8–10.5)

## 2024-02-14 DIAGNOSIS — Z51.11 ENCOUNTER FOR ANTINEOPLASTIC CHEMOTHERAPY: ICD-10-CM

## 2024-02-14 DIAGNOSIS — R11.2 NAUSEA WITH VOMITING, UNSPECIFIED: ICD-10-CM

## 2024-02-15 ENCOUNTER — APPOINTMENT (OUTPATIENT)
Dept: INFUSION THERAPY | Facility: CANCER CENTER | Age: 59
End: 2024-02-15

## 2024-02-27 ENCOUNTER — RESULT REVIEW (OUTPATIENT)
Age: 59
End: 2024-02-27

## 2024-02-27 ENCOUNTER — APPOINTMENT (OUTPATIENT)
Dept: INFUSION THERAPY | Facility: CANCER CENTER | Age: 59
End: 2024-02-27

## 2024-02-27 LAB
BASOPHILS # BLD AUTO: 0.03 K/UL — SIGNIFICANT CHANGE UP (ref 0–0.2)
BASOPHILS NFR BLD AUTO: 0.5 % — SIGNIFICANT CHANGE UP (ref 0–2)
EOSINOPHIL # BLD AUTO: 0.05 K/UL — SIGNIFICANT CHANGE UP (ref 0–0.5)
EOSINOPHIL NFR BLD AUTO: 0.8 % — SIGNIFICANT CHANGE UP (ref 0–6)
HCT VFR BLD CALC: 37.5 % — SIGNIFICANT CHANGE UP (ref 34.5–45)
HGB BLD-MCNC: 12.5 G/DL — SIGNIFICANT CHANGE UP (ref 11.5–15.5)
IMM GRANULOCYTES NFR BLD AUTO: 2 % — HIGH (ref 0–0.9)
LYMPHOCYTES # BLD AUTO: 1.53 K/UL — SIGNIFICANT CHANGE UP (ref 1–3.3)
LYMPHOCYTES # BLD AUTO: 23.1 % — SIGNIFICANT CHANGE UP (ref 13–44)
MCHC RBC-ENTMCNC: 30.9 PG — SIGNIFICANT CHANGE UP (ref 27–34)
MCHC RBC-ENTMCNC: 33.3 GM/DL — SIGNIFICANT CHANGE UP (ref 32–36)
MCV RBC AUTO: 92.8 FL — SIGNIFICANT CHANGE UP (ref 80–100)
MONOCYTES # BLD AUTO: 0.62 K/UL — SIGNIFICANT CHANGE UP (ref 0–0.9)
MONOCYTES NFR BLD AUTO: 9.4 % — SIGNIFICANT CHANGE UP (ref 2–14)
NEUTROPHILS # BLD AUTO: 4.27 K/UL — SIGNIFICANT CHANGE UP (ref 1.8–7.4)
NEUTROPHILS NFR BLD AUTO: 64.2 % — SIGNIFICANT CHANGE UP (ref 43–77)
NRBC # BLD: 0 /100 WBCS — SIGNIFICANT CHANGE UP (ref 0–0)
PLATELET # BLD AUTO: 98 K/UL — LOW (ref 150–400)
RBC # BLD: 4.04 M/UL — SIGNIFICANT CHANGE UP (ref 3.8–5.2)
RBC # FLD: 16.4 % — HIGH (ref 10.3–14.5)
WBC # BLD: 6.63 K/UL — SIGNIFICANT CHANGE UP (ref 3.8–10.5)
WBC # FLD AUTO: 6.63 K/UL — SIGNIFICANT CHANGE UP (ref 3.8–10.5)

## 2024-02-28 LAB
ALBUMIN SERPL ELPH-MCNC: 4 G/DL — SIGNIFICANT CHANGE UP (ref 3.3–5)
ALP SERPL-CCNC: 282 U/L — HIGH (ref 40–120)
ALT FLD-CCNC: 48 U/L — HIGH (ref 10–45)
ANION GAP SERPL CALC-SCNC: 14 MMOL/L — SIGNIFICANT CHANGE UP (ref 5–17)
AST SERPL-CCNC: 57 U/L — HIGH (ref 10–40)
BILIRUB SERPL-MCNC: 0.4 MG/DL — SIGNIFICANT CHANGE UP (ref 0.2–1.2)
BUN SERPL-MCNC: 10 MG/DL — SIGNIFICANT CHANGE UP (ref 7–23)
CALCIUM SERPL-MCNC: 9.2 MG/DL — SIGNIFICANT CHANGE UP (ref 8.4–10.5)
CHLORIDE SERPL-SCNC: 106 MMOL/L — SIGNIFICANT CHANGE UP (ref 96–108)
CO2 SERPL-SCNC: 20 MMOL/L — LOW (ref 22–31)
CREAT SERPL-MCNC: 0.6 MG/DL — SIGNIFICANT CHANGE UP (ref 0.5–1.3)
EGFR: 105 ML/MIN/1.73M2 — SIGNIFICANT CHANGE UP
GLUCOSE SERPL-MCNC: 142 MG/DL — HIGH (ref 70–99)
MAGNESIUM SERPL-MCNC: 2.3 MG/DL — SIGNIFICANT CHANGE UP (ref 1.6–2.6)
PHOSPHATE SERPL-MCNC: 4.2 MG/DL — SIGNIFICANT CHANGE UP (ref 2.5–4.5)
POTASSIUM SERPL-MCNC: 4.1 MMOL/L — SIGNIFICANT CHANGE UP (ref 3.5–5.3)
POTASSIUM SERPL-SCNC: 4.1 MMOL/L — SIGNIFICANT CHANGE UP (ref 3.5–5.3)
PROT SERPL-MCNC: 6.8 G/DL — SIGNIFICANT CHANGE UP (ref 6–8.3)
SODIUM SERPL-SCNC: 141 MMOL/L — SIGNIFICANT CHANGE UP (ref 135–145)

## 2024-02-29 ENCOUNTER — APPOINTMENT (OUTPATIENT)
Dept: HEMATOLOGY ONCOLOGY | Facility: CLINIC | Age: 59
End: 2024-02-29
Payer: COMMERCIAL

## 2024-02-29 ENCOUNTER — APPOINTMENT (OUTPATIENT)
Dept: INFUSION THERAPY | Facility: CANCER CENTER | Age: 59
End: 2024-02-29

## 2024-02-29 VITALS
HEART RATE: 78 BPM | BODY MASS INDEX: 21.35 KG/M2 | DIASTOLIC BLOOD PRESSURE: 75 MMHG | SYSTOLIC BLOOD PRESSURE: 129 MMHG | WEIGHT: 116 LBS | OXYGEN SATURATION: 99 % | TEMPERATURE: 97.9 F

## 2024-02-29 PROCEDURE — G2211 COMPLEX E/M VISIT ADD ON: CPT

## 2024-02-29 PROCEDURE — 99214 OFFICE O/P EST MOD 30 MIN: CPT

## 2024-02-29 NOTE — REASON FOR VISIT
[Follow-Up Visit] : a follow-up [[employer] ] : provided by [employer]   [FreeTextEntry2] : Colorectal cancer [TWNoteComboBox1] : [place]

## 2024-02-29 NOTE — HISTORY OF PRESENT ILLNESS
[de-identified] : Referred by: Dr. Alphonso De León  PCP: Dr. Crump Initially presented w/ her daughter Crystal Jackson (daughter) -  [phone]  Cancer Summary:  DIAGNOSIS: Rectosigmoid cancer  PROCEDURE AND DATE: large bowel resection/rectosigmoid excision w/ Dr. De León on 10/12/23 PATHOLOGY:  infiltrating mod diff adenocarcinoma, measuring 3cm which infiltrates the muscularis propria. 8/26 LN+ STAGE: pT2N2b, prognostic stage IIIB POSTOPERATIVE TREATMENT: Chemotherapy:  C1 FOLFOX on 11/7/23 C2 FOLFOX 11/29/23 C3 FOLFOX  12/12/23 C4 FOLFOX 12/26/23 C5 FOLFOX 1/9/24 C6 FOLFOX 1/30/24 C7 FOLFOX 2/13/24 C8 FOLFOX 2/27/24 Radiation: n/a STATUS: FLORIN    Ms. Valdes initially presented at age 57 in October 2023 for evaluation of rectosigmoid cancer.  The patient has a medical history of pre-diabetes.  Surgical history: recent large bowel excision with colostomy placement.  Jess was undergoing evaluation for uterine prolapse- recommended her for screening colonoscopy during that workup which revealed a mass in the rectosigmoid colon. Colonoscopy was performed on 9/11/23 by Dr. De León- biopsy was performed which revealed severely ulcerated superficial fragments of adenocarcinoma, moderately differentiated, in a background of tubular adenoma with high-grade dysplasia, microsatellite stable.  Imaging with pelvic MRI on September 27, 2023 revealed a high rectal cancer, stage T1-T2, no concerning lymph nodes were seen at that time.  CT chest abdomen pelvis revealed no evidence of metastatic disease.  She was seen initially by Dr. Jatin Pedro at [facility]. She ultimately underwent large bowel resection/rectosigmoid excision w/ Dr. De León on 10/12/23 which revealed infiltrating mod diff adenocarcinoma, measuring 3cm which infiltrates the muscularis propria.  8 out of 26 lymph nodes were positive for metastatic carcinoma. She was referred today for discussion of adjuvant therapy.  They are Kazakh-speaking.  She denied recent headaches, visual changes, balance issues, CP, cough, SOB, n/v/d, constipation, unintentional weight loss or new bone or back pain.  Imaging:  MR pelvis 9/27/23- high rectal cancer, stage T1/T2, N0 CT C/A/P 9/27/23- no evidence of metastatic disease in the CAP  Path: Sigmoid colon mass biopsy 9/11/23 Large bowel rectosigmoid excision 10/12/23- Infiltrating moderately differentiated adenocarcinoma measuring 3.0 cm, infiltrates into the muscularis propria -8/26 lymph nodes are positive for metastatic carcinoma.  LVI neg.  2.  Large bowel, proximal anastomosis, excision: Negative for malignancy 3. Large bowel, distal anastomosis, excision: Negative for malignancy  pT2N2b, prognostic stage IIIB  Genetics: not done    HCM: - COVID vaccination:  Moderna x2, no boosters - Flu shots - 10/12/23 - Colonoscopy:  9/11/23 -> rectosigmoid mass - Gyn:  2023 - no hx abnormal Paps - Mammo:  2023 - wnl - Lung cancer screen:  n/a - DEXA:  none prior   SH: - Occupation:  not currently working  - Living situation:  Lives in [place] with her son and family - Smoking/etoh/illicits:  Never smoker, rare etoh, no illicits  - Exercise: walking   FH: - no known family history of cancer    [de-identified] : Presents for follow up on 2/29/24 for rectosigmoid cancer. Accompanied by her son in law. Received D1C1 FOLFOX on 11/7/23  Assisted by  Paresh (#[id number])  S/p C8 FOLFOX this week. At today's visit she reports feeling generally well.  Appetite and energy level stable.  Having mild fatigue after treatment.  No n/v.  Ostomy is functioning well.  She continues to experience mild erythema on her hands and face, she is using a moisturizing cream. She has sensitivity to cold in her hands. She denies fevers, chills, CP, SOB, nausea, vomiting, diarrhea, mouth sores, LE swelling or neuropathy at this time. Her ostomy remains in place.   Laboratory studies reviewed at today's visit (from 2/27/24) and notable for: WBC 6.63, Hb 12.5, plt 98

## 2024-02-29 NOTE — PHYSICAL EXAM
[Fully active, able to carry on all pre-disease performance without restriction] : Status 0 - Fully active, able to carry on all pre-disease performance without restriction [Normal] : affect appropriate [de-identified] : s/p colostomy placement, well healed, nontender [de-identified] : well appearing female, NAD, pleasant, mild flushing of face/hands

## 2024-02-29 NOTE — RESULTS/DATA
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  Imaging was negative for metastatic disease. She is now s/p large bowel rectosigmoid excision 10/12/23 w/ Dr. Gutierrez which revealed Infiltrating moderately differentiated adenocarcinoma measuring 3.0 cm, infiltrates into the muscularis propria, 8/26 lymph nodes are positive for metastatic carcinoma.  LVI neg.   pT2N2b, prognostic stage IIIB Given multiple positive lymph nodes and stage III disease werecommended adjuvant chemotherapy with FOLFOX x6 months. In patients w/ high risk stage III colorectal cancer, 3 months of FOLFOX was inferior to 6 months of FOLFOX.  Risks, benefits and side effects of FOLFOX were discussed in detail at today's visit.  We had an informed discussion regarding the patients pathology, prognosis, and treatment options for stage III colorectal cancer. We discussed therapy with FOLFOX: 5- mg/m2 IV bolus on D1, 5-FU 1200 mg/m2 CI on D1 and D2 (total 2400mg/m2 over 46-48 hours) continuous infusion and oxaliplatin 85 mg/m2 on D1, of every 14 day cycle x 12 cycles. Side effects of FOLFOX were discussed including moderate emesis risk, myelosuppression, diarrhea, neuropathy, Qtc prolongation, neurotoxicity, hand-foot syndrome, hair thinning/alopecia, hypersensitivity reaction. A lower starting dose of oxaliplatin may be needed for severe renal impairment. A lower starting dose of 5FU may be needed for patients with liver impairment. Severe diarrhea, mucositis, and myelosuppression after FU should prompt evaluation for DPD deficiency. Standard antiemetic prophylaxis includes fosaprepitant, palonosetron, and dexamethasone. Received D1C1 FOLFOX 11/7/23 w/ good tolerance. ONPRO added for neutropenia.  Completed C8 this week, plan for 12 cycles total.  Can consider future screening with signatera ctDNA q 3-6 months.  Schedule up upcoming visits: C9 3/12 C10 3/26 C11 4/9 (visit w/ Dr. Ugarte on 4/11 w/ CADD disconnect) C12 4/23

## 2024-03-12 ENCOUNTER — RESULT REVIEW (OUTPATIENT)
Age: 59
End: 2024-03-12

## 2024-03-12 ENCOUNTER — APPOINTMENT (OUTPATIENT)
Dept: INFUSION THERAPY | Facility: CANCER CENTER | Age: 59
End: 2024-03-12

## 2024-03-12 LAB
ANISOCYTOSIS BLD QL: SLIGHT — SIGNIFICANT CHANGE UP
BASOPHILS # BLD AUTO: 0 K/UL — SIGNIFICANT CHANGE UP (ref 0–0.2)
BASOPHILS NFR BLD AUTO: 0 % — SIGNIFICANT CHANGE UP (ref 0–2)
EOSINOPHIL # BLD AUTO: 0 K/UL — SIGNIFICANT CHANGE UP (ref 0–0.5)
EOSINOPHIL NFR BLD AUTO: 0 % — SIGNIFICANT CHANGE UP (ref 0–6)
HCT VFR BLD CALC: 36.6 % — SIGNIFICANT CHANGE UP (ref 34.5–45)
HGB BLD-MCNC: 12.3 G/DL — SIGNIFICANT CHANGE UP (ref 11.5–15.5)
LYMPHOCYTES # BLD AUTO: 1.42 K/UL — SIGNIFICANT CHANGE UP (ref 1–3.3)
LYMPHOCYTES # BLD AUTO: 19 % — SIGNIFICANT CHANGE UP (ref 13–44)
MCHC RBC-ENTMCNC: 32.1 PG — SIGNIFICANT CHANGE UP (ref 27–34)
MCHC RBC-ENTMCNC: 33.6 GM/DL — SIGNIFICANT CHANGE UP (ref 32–36)
MCV RBC AUTO: 95.6 FL — SIGNIFICANT CHANGE UP (ref 80–100)
METAMYELOCYTES # FLD: 1 % — HIGH (ref 0–0)
MONOCYTES # BLD AUTO: 0.37 K/UL — SIGNIFICANT CHANGE UP (ref 0–0.9)
MONOCYTES NFR BLD AUTO: 5 % — SIGNIFICANT CHANGE UP (ref 2–14)
MYELOCYTES NFR BLD: 1 % — HIGH (ref 0–0)
NEUTROPHILS # BLD AUTO: 5.51 K/UL — SIGNIFICANT CHANGE UP (ref 1.8–7.4)
NEUTROPHILS NFR BLD AUTO: 74 % — SIGNIFICANT CHANGE UP (ref 43–77)
NRBC # BLD: 0 /100 WBCS — SIGNIFICANT CHANGE UP (ref 0–0)
NRBC # BLD: SIGNIFICANT CHANGE UP /100 WBCS (ref 0–0)
PLAT MORPH BLD: NORMAL — SIGNIFICANT CHANGE UP
PLATELET # BLD AUTO: 73 K/UL — LOW (ref 150–400)
POLYCHROMASIA BLD QL SMEAR: SLIGHT — SIGNIFICANT CHANGE UP
RBC # BLD: 3.83 M/UL — SIGNIFICANT CHANGE UP (ref 3.8–5.2)
RBC # FLD: 17.2 % — HIGH (ref 10.3–14.5)
RBC BLD AUTO: NORMAL — SIGNIFICANT CHANGE UP
WBC # BLD: 7.45 K/UL — SIGNIFICANT CHANGE UP (ref 3.8–10.5)
WBC # FLD AUTO: 7.45 K/UL — SIGNIFICANT CHANGE UP (ref 3.8–10.5)

## 2024-03-13 LAB
ALBUMIN SERPL ELPH-MCNC: 4 G/DL — SIGNIFICANT CHANGE UP (ref 3.3–5)
ALP SERPL-CCNC: 311 U/L — HIGH (ref 40–120)
ALT FLD-CCNC: 75 U/L — HIGH (ref 10–45)
ANION GAP SERPL CALC-SCNC: 14 MMOL/L — SIGNIFICANT CHANGE UP (ref 5–17)
AST SERPL-CCNC: 89 U/L — HIGH (ref 10–40)
BILIRUB SERPL-MCNC: 0.4 MG/DL — SIGNIFICANT CHANGE UP (ref 0.2–1.2)
BUN SERPL-MCNC: 9 MG/DL — SIGNIFICANT CHANGE UP (ref 7–23)
CALCIUM SERPL-MCNC: 9.3 MG/DL — SIGNIFICANT CHANGE UP (ref 8.4–10.5)
CHLORIDE SERPL-SCNC: 106 MMOL/L — SIGNIFICANT CHANGE UP (ref 96–108)
CO2 SERPL-SCNC: 18 MMOL/L — LOW (ref 22–31)
CREAT SERPL-MCNC: 0.6 MG/DL — SIGNIFICANT CHANGE UP (ref 0.5–1.3)
EGFR: 105 ML/MIN/1.73M2 — SIGNIFICANT CHANGE UP
GLUCOSE SERPL-MCNC: 206 MG/DL — HIGH (ref 70–99)
MAGNESIUM SERPL-MCNC: 2.2 MG/DL — SIGNIFICANT CHANGE UP (ref 1.6–2.6)
PHOSPHATE SERPL-MCNC: 3.6 MG/DL — SIGNIFICANT CHANGE UP (ref 2.5–4.5)
POTASSIUM SERPL-MCNC: 4.3 MMOL/L — SIGNIFICANT CHANGE UP (ref 3.5–5.3)
POTASSIUM SERPL-SCNC: 4.3 MMOL/L — SIGNIFICANT CHANGE UP (ref 3.5–5.3)
PROT SERPL-MCNC: 6.7 G/DL — SIGNIFICANT CHANGE UP (ref 6–8.3)
SODIUM SERPL-SCNC: 138 MMOL/L — SIGNIFICANT CHANGE UP (ref 135–145)

## 2024-03-14 ENCOUNTER — APPOINTMENT (OUTPATIENT)
Dept: INFUSION THERAPY | Facility: CANCER CENTER | Age: 59
End: 2024-03-14

## 2024-03-19 ENCOUNTER — RESULT REVIEW (OUTPATIENT)
Age: 59
End: 2024-03-19

## 2024-03-19 ENCOUNTER — APPOINTMENT (OUTPATIENT)
Dept: INFUSION THERAPY | Facility: CANCER CENTER | Age: 59
End: 2024-03-19

## 2024-03-19 LAB
ALBUMIN SERPL ELPH-MCNC: 4 G/DL — SIGNIFICANT CHANGE UP (ref 3.3–5)
ALP SERPL-CCNC: 227 U/L — HIGH (ref 40–120)
ALT FLD-CCNC: 41 U/L — SIGNIFICANT CHANGE UP (ref 10–45)
ANION GAP SERPL CALC-SCNC: 11 MMOL/L — SIGNIFICANT CHANGE UP (ref 5–17)
AST SERPL-CCNC: 49 U/L — HIGH (ref 10–40)
BASOPHILS # BLD AUTO: 0.01 K/UL — SIGNIFICANT CHANGE UP (ref 0–0.2)
BASOPHILS NFR BLD AUTO: 0.2 % — SIGNIFICANT CHANGE UP (ref 0–2)
BILIRUB SERPL-MCNC: 0.4 MG/DL — SIGNIFICANT CHANGE UP (ref 0.2–1.2)
BUN SERPL-MCNC: 10 MG/DL — SIGNIFICANT CHANGE UP (ref 7–23)
CALCIUM SERPL-MCNC: 8.7 MG/DL — SIGNIFICANT CHANGE UP (ref 8.4–10.5)
CHLORIDE SERPL-SCNC: 108 MMOL/L — SIGNIFICANT CHANGE UP (ref 96–108)
CO2 SERPL-SCNC: 22 MMOL/L — SIGNIFICANT CHANGE UP (ref 22–31)
CREAT SERPL-MCNC: 0.54 MG/DL — SIGNIFICANT CHANGE UP (ref 0.5–1.3)
EGFR: 107 ML/MIN/1.73M2 — SIGNIFICANT CHANGE UP
EOSINOPHIL # BLD AUTO: 0.02 K/UL — SIGNIFICANT CHANGE UP (ref 0–0.5)
EOSINOPHIL NFR BLD AUTO: 0.4 % — SIGNIFICANT CHANGE UP (ref 0–6)
GLUCOSE SERPL-MCNC: 156 MG/DL — HIGH (ref 70–99)
HCT VFR BLD CALC: 36.5 % — SIGNIFICANT CHANGE UP (ref 34.5–45)
HGB BLD-MCNC: 12 G/DL — SIGNIFICANT CHANGE UP (ref 11.5–15.5)
IMM GRANULOCYTES NFR BLD AUTO: 0.2 % — SIGNIFICANT CHANGE UP (ref 0–0.9)
LYMPHOCYTES # BLD AUTO: 0.96 K/UL — LOW (ref 1–3.3)
LYMPHOCYTES # BLD AUTO: 21.4 % — SIGNIFICANT CHANGE UP (ref 13–44)
MAGNESIUM SERPL-MCNC: 2.2 MG/DL — SIGNIFICANT CHANGE UP (ref 1.6–2.6)
MCHC RBC-ENTMCNC: 31.4 PG — SIGNIFICANT CHANGE UP (ref 27–34)
MCHC RBC-ENTMCNC: 32.9 GM/DL — SIGNIFICANT CHANGE UP (ref 32–36)
MCV RBC AUTO: 95.5 FL — SIGNIFICANT CHANGE UP (ref 80–100)
MONOCYTES # BLD AUTO: 0.59 K/UL — SIGNIFICANT CHANGE UP (ref 0–0.9)
MONOCYTES NFR BLD AUTO: 13.1 % — SIGNIFICANT CHANGE UP (ref 2–14)
NEUTROPHILS # BLD AUTO: 2.9 K/UL — SIGNIFICANT CHANGE UP (ref 1.8–7.4)
NEUTROPHILS NFR BLD AUTO: 64.7 % — SIGNIFICANT CHANGE UP (ref 43–77)
NRBC # BLD: 0 /100 WBCS — SIGNIFICANT CHANGE UP (ref 0–0)
PHOSPHATE SERPL-MCNC: 4 MG/DL — SIGNIFICANT CHANGE UP (ref 2.5–4.5)
PLATELET # BLD AUTO: 156 K/UL — SIGNIFICANT CHANGE UP (ref 150–400)
POTASSIUM SERPL-MCNC: 4.1 MMOL/L — SIGNIFICANT CHANGE UP (ref 3.5–5.3)
POTASSIUM SERPL-SCNC: 4.1 MMOL/L — SIGNIFICANT CHANGE UP (ref 3.5–5.3)
PROT SERPL-MCNC: 6.1 G/DL — SIGNIFICANT CHANGE UP (ref 6–8.3)
RBC # BLD: 3.82 M/UL — SIGNIFICANT CHANGE UP (ref 3.8–5.2)
RBC # FLD: 16.8 % — HIGH (ref 10.3–14.5)
SODIUM SERPL-SCNC: 142 MMOL/L — SIGNIFICANT CHANGE UP (ref 135–145)
WBC # BLD: 4.49 K/UL — SIGNIFICANT CHANGE UP (ref 3.8–10.5)
WBC # FLD AUTO: 4.49 K/UL — SIGNIFICANT CHANGE UP (ref 3.8–10.5)

## 2024-03-21 ENCOUNTER — APPOINTMENT (OUTPATIENT)
Dept: INFUSION THERAPY | Facility: CANCER CENTER | Age: 59
End: 2024-03-21

## 2024-03-26 ENCOUNTER — NON-APPOINTMENT (OUTPATIENT)
Age: 59
End: 2024-03-26

## 2024-03-28 ENCOUNTER — APPOINTMENT (OUTPATIENT)
Dept: HEMATOLOGY ONCOLOGY | Facility: CLINIC | Age: 59
End: 2024-03-28
Payer: COMMERCIAL

## 2024-03-28 VITALS
HEART RATE: 82 BPM | OXYGEN SATURATION: 97 % | BODY MASS INDEX: 21.6 KG/M2 | DIASTOLIC BLOOD PRESSURE: 82 MMHG | SYSTOLIC BLOOD PRESSURE: 132 MMHG | WEIGHT: 117.4 LBS | TEMPERATURE: 98.5 F

## 2024-03-28 PROCEDURE — 99214 OFFICE O/P EST MOD 30 MIN: CPT

## 2024-03-28 PROCEDURE — G2211 COMPLEX E/M VISIT ADD ON: CPT

## 2024-03-28 NOTE — RESULTS/DATA
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  Imaging was negative for metastatic disease. She is now s/p large bowel rectosigmoid excision 10/12/23 w/ Dr. Gutierrez which revealed Infiltrating moderately differentiated adenocarcinoma measuring 3.0 cm, infiltrates into the muscularis propria, 8/26 lymph nodes are positive for metastatic carcinoma.  LVI neg.   pT2N2b, prognostic stage IIIB Given multiple positive lymph nodes and stage III disease would recommend adjuvant chemotherapy with FOLFOX x6 months. In patients w/ high risk stage III colorectal cancer, 3 months of FOLFOX was inferior to 6 months of FOLFOX.  Risks, benefits and side effects of FOLFOX were discussed in detail. Received D1C1 FOLFOX 11/7/23 w/ good tolerance. Mild erythema of her hands and cold sensitivity, but denies neuropathy. ONPRO added for neutropenia.  Completed C9 this week, plan for 12 cycles total. Drop oxaliplatin after C10.  Pending 3 additional cycles. Next tx on 4/2. She was seen in the ER last weekend for mild erythema of the port site, was started on doxycycline with improvement in erythema.  If afebrile can proceed w/ tx on 4/2.  F/U with NP on 5/2/24.

## 2024-03-28 NOTE — REASON FOR VISIT
[Follow-Up Visit] : a follow-up [[employer] ] : provided by [employer]   [Time Spent: ____ minutes] : Total time spent using  services: [unfilled] minutes. The patient's primary language is not English thus required  services. [FreeTextEntry2] : Rectal Cancer  [Interpreters_IDNumber] : [id number]  [Interpreters_FullName] : Gustavo [TWNoteComboBox1] : Italian

## 2024-03-28 NOTE — HISTORY OF PRESENT ILLNESS
[de-identified] : Referred by: Dr. Alphonso De León  PCP: Dr. Crump Here today w/ her daughter Crystal Jackson (daughter) -  [phone]  Cancer Summary:  DIAGNOSIS: Rectosigmoid cancer  PROCEDURE AND DATE: large bowel resection/rectosigmoid excision w/ Dr. De León on 10/12/23 PATHOLOGY:  infiltrating mod diff adenocarcinoma, measuring 3cm which infiltrates the muscularis propria. 8/26 LN+ STAGE: pT2N2b, prognostic stage IIIB POSTOPERATIVE TREATMENT: Chemotherapy:  C1 FOLFOX on 11/7/23 C2 FOLFOX 11/29/23 C3 FOLFOX 12/12/23 C4 FOLFOX 12/26/23 C5 FOLFOX 1/9/24 C6 FOLFOX 1/30/24 C7 FOLFOX 2/13/24 C8 FOLFOX 2/27/24 C9 FOLFOX 3/19/24 Radiation: n/a STATUS: FLORIN    Ms. Valdes presented at age 57 in October 2023 for evaluation of rectosigmoid cancer.  The patient has a medical history of pre-diabetes.  Surgical history: recent large bowel excision with colostomy placement.  Jess was undergoing evaluation for uterine prolapse- recommended her for screening colonoscopy during that workup which revealed a mass in the rectosigmoid colon. Colonoscopy was performed on 9/11/23 by Dr. De León- biopsy was performed which revealed severely ulcerated superficial fragments of adenocarcinoma, moderately differentiated, in a background of tubular adenoma with high-grade dysplasia, microsatellite stable.  Imaging with pelvic MRI on September 27, 2023 revealed a high rectal cancer, stage T1-T2, no concerning lymph nodes were seen at that time.  CT chest abdomen pelvis revealed no evidence of metastatic disease.  She was seen initially by Dr. Jatin Pedro at [facility]. She ultimately underwent large bowel resection/rectosigmoid excision w/ Dr. De León on 10/12/23 which revealed infiltrating mod diff adenocarcinoma, measuring 3cm which infiltrates the muscularis propria.  8 out of 26 lymph nodes were positive for metastatic carcinoma. She was referred today for discussion of adjuvant therapy.  At today's visit she is here with her daughter.  They are Romanian-speaking.  She denies recent headaches, visual changes, balance issues, CP, cough, SOB, n/v/d, constipation, unintentional weight loss or new bone or back pain.  She is healing well from surgery.  She does report that they are running out colostomy bags.   Imaging:  MR pelvis 9/27/23- high rectal cancer, stage T1/T2, N0 CT C/A/P 9/27/23- no evidence of metastatic disease in the CAP  Path: Sigmoid colon mass biopsy 9/11/23 Large bowel rectosigmoid excision 10/12/23- Infiltrating moderately differentiated adenocarcinoma measuring 3.0 cm, infiltrates into the muscularis propria -8/26 lymph nodes are positive for metastatic carcinoma.  LVI neg.  2.  Large bowel, proximal anastomosis, excision: Negative for malignancy 3. Large bowel, distal anastomosis, excision: Negative for malignancy  pT2N2b, prognostic stage IIIB  Genetics: not done    HCM: - COVID vaccination:  Moderna x2, no boosters - Flu shots - 10/12/23 - Colonoscopy:  9/11/23 -> rectosigmoid mass - Gyn:  2023 - no hx abnormal Paps - Mammo:  2023 - wnl - Lung cancer screen:  n/a - DEXA:  none prior   SH: - Occupation:  not currently working  - Living situation:  Lives in [place] with her son and family - Smoking/etoh/illicits:  Never smoker, rare etoh, no illicits  - Exercise: walking   FH: - no known family history of cancer    [de-identified] : Presents for follow up on 3/28/24 for rectosigmoid cancer.  Received D1C1 FOLFOX on 11/7/23  Treatment held on 3/12 due to thrombocytopenia (platelet count 73). S/p C9 FOLFOX 3/19/24. Pending 3 additional cycles. Next tx on 4/2. She is generally tolerating chemotherapy very well. Reports mild cold sensitivity of her hands.  She was seen in the ER last weekend for mild erythema of the port site, was started on doxycycline with improvement in erythema. She denies any fevers or chills.  Treatment should end on April 30.

## 2024-03-28 NOTE — PHYSICAL EXAM
[Fully active, able to carry on all pre-disease performance without restriction] : Status 0 - Fully active, able to carry on all pre-disease performance without restriction [Normal] : affect appropriate [de-identified] : s/p colostomy placement, healing well, nontender [de-identified] : well appearing female, NAD, pleasant

## 2024-04-02 ENCOUNTER — RESULT REVIEW (OUTPATIENT)
Age: 59
End: 2024-04-02

## 2024-04-02 ENCOUNTER — APPOINTMENT (OUTPATIENT)
Dept: INFUSION THERAPY | Facility: CANCER CENTER | Age: 59
End: 2024-04-02

## 2024-04-02 LAB
ALBUMIN SERPL ELPH-MCNC: 3.8 G/DL — SIGNIFICANT CHANGE UP (ref 3.3–5)
ALP SERPL-CCNC: 266 U/L — HIGH (ref 40–120)
ALT FLD-CCNC: 33 U/L — SIGNIFICANT CHANGE UP (ref 10–45)
ANION GAP SERPL CALC-SCNC: 11 MMOL/L — SIGNIFICANT CHANGE UP (ref 5–17)
AST SERPL-CCNC: 43 U/L — HIGH (ref 10–40)
BASOPHILS # BLD AUTO: 0.03 K/UL — SIGNIFICANT CHANGE UP (ref 0–0.2)
BASOPHILS NFR BLD AUTO: 0.5 % — SIGNIFICANT CHANGE UP (ref 0–2)
BILIRUB SERPL-MCNC: 0.4 MG/DL — SIGNIFICANT CHANGE UP (ref 0.2–1.2)
BUN SERPL-MCNC: 10 MG/DL — SIGNIFICANT CHANGE UP (ref 7–23)
CALCIUM SERPL-MCNC: 9 MG/DL — SIGNIFICANT CHANGE UP (ref 8.4–10.5)
CHLORIDE SERPL-SCNC: 107 MMOL/L — SIGNIFICANT CHANGE UP (ref 96–108)
CO2 SERPL-SCNC: 22 MMOL/L — SIGNIFICANT CHANGE UP (ref 22–31)
CREAT SERPL-MCNC: 0.68 MG/DL — SIGNIFICANT CHANGE UP (ref 0.5–1.3)
EGFR: 102 ML/MIN/1.73M2 — SIGNIFICANT CHANGE UP
EOSINOPHIL # BLD AUTO: 0.08 K/UL — SIGNIFICANT CHANGE UP (ref 0–0.5)
EOSINOPHIL NFR BLD AUTO: 1.3 % — SIGNIFICANT CHANGE UP (ref 0–6)
GLUCOSE SERPL-MCNC: 128 MG/DL — HIGH (ref 70–99)
HCT VFR BLD CALC: 36.9 % — SIGNIFICANT CHANGE UP (ref 34.5–45)
HGB BLD-MCNC: 12.3 G/DL — SIGNIFICANT CHANGE UP (ref 11.5–15.5)
IMM GRANULOCYTES NFR BLD AUTO: 0.7 % — SIGNIFICANT CHANGE UP (ref 0–0.9)
LYMPHOCYTES # BLD AUTO: 0.97 K/UL — LOW (ref 1–3.3)
LYMPHOCYTES # BLD AUTO: 16 % — SIGNIFICANT CHANGE UP (ref 13–44)
MAGNESIUM SERPL-MCNC: 2.1 MG/DL — SIGNIFICANT CHANGE UP (ref 1.6–2.6)
MCHC RBC-ENTMCNC: 31.9 PG — SIGNIFICANT CHANGE UP (ref 27–34)
MCHC RBC-ENTMCNC: 33.3 GM/DL — SIGNIFICANT CHANGE UP (ref 32–36)
MCV RBC AUTO: 95.6 FL — SIGNIFICANT CHANGE UP (ref 80–100)
MONOCYTES # BLD AUTO: 0.41 K/UL — SIGNIFICANT CHANGE UP (ref 0–0.9)
MONOCYTES NFR BLD AUTO: 6.8 % — SIGNIFICANT CHANGE UP (ref 2–14)
NEUTROPHILS # BLD AUTO: 4.54 K/UL — SIGNIFICANT CHANGE UP (ref 1.8–7.4)
NEUTROPHILS NFR BLD AUTO: 74.7 % — SIGNIFICANT CHANGE UP (ref 43–77)
NRBC # BLD: 0 /100 WBCS — SIGNIFICANT CHANGE UP (ref 0–0)
PHOSPHATE SERPL-MCNC: 3.4 MG/DL — SIGNIFICANT CHANGE UP (ref 2.5–4.5)
PLATELET # BLD AUTO: 92 K/UL — LOW (ref 150–400)
POTASSIUM SERPL-MCNC: 4.3 MMOL/L — SIGNIFICANT CHANGE UP (ref 3.5–5.3)
POTASSIUM SERPL-SCNC: 4.3 MMOL/L — SIGNIFICANT CHANGE UP (ref 3.5–5.3)
PROT SERPL-MCNC: 6.1 G/DL — SIGNIFICANT CHANGE UP (ref 6–8.3)
RBC # BLD: 3.86 M/UL — SIGNIFICANT CHANGE UP (ref 3.8–5.2)
RBC # FLD: 15.9 % — HIGH (ref 10.3–14.5)
SODIUM SERPL-SCNC: 140 MMOL/L — SIGNIFICANT CHANGE UP (ref 135–145)
WBC # BLD: 6.07 K/UL — SIGNIFICANT CHANGE UP (ref 3.8–10.5)
WBC # FLD AUTO: 6.07 K/UL — SIGNIFICANT CHANGE UP (ref 3.8–10.5)

## 2024-04-04 ENCOUNTER — APPOINTMENT (OUTPATIENT)
Dept: INFUSION THERAPY | Facility: CANCER CENTER | Age: 59
End: 2024-04-04

## 2024-04-04 PROCEDURE — 96368 THER/DIAG CONCURRENT INF: CPT

## 2024-04-04 PROCEDURE — 83735 ASSAY OF MAGNESIUM: CPT

## 2024-04-04 PROCEDURE — 96375 TX/PRO/DX INJ NEW DRUG ADDON: CPT

## 2024-04-04 PROCEDURE — 84100 ASSAY OF PHOSPHORUS: CPT

## 2024-04-04 PROCEDURE — 96523 IRRIG DRUG DELIVERY DEVICE: CPT

## 2024-04-04 PROCEDURE — 96411 CHEMO IV PUSH ADDL DRUG: CPT

## 2024-04-04 PROCEDURE — 96377 APPLICATON ON-BODY INJECTOR: CPT

## 2024-04-04 PROCEDURE — 96416 CHEMO PROLONG INFUSE W/PUMP: CPT

## 2024-04-04 PROCEDURE — 96415 CHEMO IV INFUSION ADDL HR: CPT

## 2024-04-04 PROCEDURE — 85027 COMPLETE CBC AUTOMATED: CPT

## 2024-04-04 PROCEDURE — 96413 CHEMO IV INFUSION 1 HR: CPT

## 2024-04-04 PROCEDURE — 80053 COMPREHEN METABOLIC PANEL: CPT

## 2024-04-05 ENCOUNTER — OUTPATIENT (OUTPATIENT)
Dept: OUTPATIENT SERVICES | Facility: HOSPITAL | Age: 59
LOS: 1 days | End: 2024-04-05
Payer: COMMERCIAL

## 2024-04-05 DIAGNOSIS — C20 MALIGNANT NEOPLASM OF RECTUM: ICD-10-CM

## 2024-04-05 DIAGNOSIS — Z98.890 OTHER SPECIFIED POSTPROCEDURAL STATES: Chronic | ICD-10-CM

## 2024-04-05 DIAGNOSIS — R11.2 NAUSEA WITH VOMITING, UNSPECIFIED: ICD-10-CM

## 2024-04-05 DIAGNOSIS — Z51.11 ENCOUNTER FOR ANTINEOPLASTIC CHEMOTHERAPY: ICD-10-CM

## 2024-04-05 DIAGNOSIS — Z51.89 ENCOUNTER FOR OTHER SPECIFIED AFTERCARE: ICD-10-CM

## 2024-04-11 ENCOUNTER — APPOINTMENT (OUTPATIENT)
Dept: HEMATOLOGY ONCOLOGY | Facility: CLINIC | Age: 59
End: 2024-04-11

## 2024-04-16 ENCOUNTER — RESULT REVIEW (OUTPATIENT)
Age: 59
End: 2024-04-16

## 2024-04-16 ENCOUNTER — APPOINTMENT (OUTPATIENT)
Age: 59
End: 2024-04-16

## 2024-04-16 LAB
BASOPHILS # BLD AUTO: 0.02 K/UL — SIGNIFICANT CHANGE UP (ref 0–0.2)
BASOPHILS NFR BLD AUTO: 0.3 % — SIGNIFICANT CHANGE UP (ref 0–2)
EOSINOPHIL # BLD AUTO: 0.1 K/UL — SIGNIFICANT CHANGE UP (ref 0–0.5)
EOSINOPHIL NFR BLD AUTO: 1.4 % — SIGNIFICANT CHANGE UP (ref 0–6)
HCT VFR BLD CALC: 35.9 % — SIGNIFICANT CHANGE UP (ref 34.5–45)
HGB BLD-MCNC: 11.8 G/DL — SIGNIFICANT CHANGE UP (ref 11.5–15.5)
IMM GRANULOCYTES NFR BLD AUTO: 1.4 % — HIGH (ref 0–0.9)
LYMPHOCYTES # BLD AUTO: 1.24 K/UL — SIGNIFICANT CHANGE UP (ref 1–3.3)
LYMPHOCYTES # BLD AUTO: 17 % — SIGNIFICANT CHANGE UP (ref 13–44)
MCHC RBC-ENTMCNC: 31.4 PG — SIGNIFICANT CHANGE UP (ref 27–34)
MCHC RBC-ENTMCNC: 32.9 GM/DL — SIGNIFICANT CHANGE UP (ref 32–36)
MCV RBC AUTO: 95.5 FL — SIGNIFICANT CHANGE UP (ref 80–100)
MONOCYTES # BLD AUTO: 0.64 K/UL — SIGNIFICANT CHANGE UP (ref 0–0.9)
MONOCYTES NFR BLD AUTO: 8.8 % — SIGNIFICANT CHANGE UP (ref 2–14)
NEUTROPHILS # BLD AUTO: 5.2 K/UL — SIGNIFICANT CHANGE UP (ref 1.8–7.4)
NEUTROPHILS NFR BLD AUTO: 71.1 % — SIGNIFICANT CHANGE UP (ref 43–77)
NRBC # BLD: 0 /100 WBCS — SIGNIFICANT CHANGE UP (ref 0–0)
PLATELET # BLD AUTO: 97 K/UL — LOW (ref 150–400)
RBC # BLD: 3.76 M/UL — LOW (ref 3.8–5.2)
RBC # FLD: 15.6 % — HIGH (ref 10.3–14.5)
WBC # BLD: 7.3 K/UL — SIGNIFICANT CHANGE UP (ref 3.8–10.5)
WBC # FLD AUTO: 7.3 K/UL — SIGNIFICANT CHANGE UP (ref 3.8–10.5)

## 2024-04-17 LAB
ALBUMIN SERPL ELPH-MCNC: 4 G/DL — SIGNIFICANT CHANGE UP (ref 3.3–5)
ALP SERPL-CCNC: 280 U/L — HIGH (ref 40–120)
ALT FLD-CCNC: 39 U/L — SIGNIFICANT CHANGE UP (ref 10–45)
ANION GAP SERPL CALC-SCNC: 10 MMOL/L — SIGNIFICANT CHANGE UP (ref 5–17)
AST SERPL-CCNC: 52 U/L — HIGH (ref 10–40)
BILIRUB SERPL-MCNC: 0.3 MG/DL — SIGNIFICANT CHANGE UP (ref 0.2–1.2)
BUN SERPL-MCNC: 10 MG/DL — SIGNIFICANT CHANGE UP (ref 7–23)
CALCIUM SERPL-MCNC: 9.3 MG/DL — SIGNIFICANT CHANGE UP (ref 8.4–10.5)
CHLORIDE SERPL-SCNC: 106 MMOL/L — SIGNIFICANT CHANGE UP (ref 96–108)
CO2 SERPL-SCNC: 24 MMOL/L — SIGNIFICANT CHANGE UP (ref 22–31)
CREAT SERPL-MCNC: 0.6 MG/DL — SIGNIFICANT CHANGE UP (ref 0.5–1.3)
EGFR: 105 ML/MIN/1.73M2 — SIGNIFICANT CHANGE UP
GLUCOSE SERPL-MCNC: 156 MG/DL — HIGH (ref 70–99)
MAGNESIUM SERPL-MCNC: 2.2 MG/DL — SIGNIFICANT CHANGE UP (ref 1.6–2.6)
PHOSPHATE SERPL-MCNC: 4.1 MG/DL — SIGNIFICANT CHANGE UP (ref 2.5–4.5)
POTASSIUM SERPL-MCNC: 4.4 MMOL/L — SIGNIFICANT CHANGE UP (ref 3.5–5.3)
POTASSIUM SERPL-SCNC: 4.4 MMOL/L — SIGNIFICANT CHANGE UP (ref 3.5–5.3)
PROT SERPL-MCNC: 6.2 G/DL — SIGNIFICANT CHANGE UP (ref 6–8.3)
SODIUM SERPL-SCNC: 140 MMOL/L — SIGNIFICANT CHANGE UP (ref 135–145)

## 2024-04-18 ENCOUNTER — APPOINTMENT (OUTPATIENT)
Dept: INFUSION THERAPY | Facility: CANCER CENTER | Age: 59
End: 2024-04-18

## 2024-04-29 ENCOUNTER — NON-APPOINTMENT (OUTPATIENT)
Age: 59
End: 2024-04-29

## 2024-04-30 ENCOUNTER — RESULT REVIEW (OUTPATIENT)
Age: 59
End: 2024-04-30

## 2024-04-30 ENCOUNTER — APPOINTMENT (OUTPATIENT)
Age: 59
End: 2024-04-30

## 2024-04-30 LAB
BASOPHILS # BLD AUTO: 0.01 K/UL — SIGNIFICANT CHANGE UP (ref 0–0.2)
BASOPHILS NFR BLD AUTO: 0.3 % — SIGNIFICANT CHANGE UP (ref 0–2)
EOSINOPHIL # BLD AUTO: 0.04 K/UL — SIGNIFICANT CHANGE UP (ref 0–0.5)
EOSINOPHIL NFR BLD AUTO: 1.2 % — SIGNIFICANT CHANGE UP (ref 0–6)
HCT VFR BLD CALC: 36.4 % — SIGNIFICANT CHANGE UP (ref 34.5–45)
HGB BLD-MCNC: 12.2 G/DL — SIGNIFICANT CHANGE UP (ref 11.5–15.5)
IMM GRANULOCYTES NFR BLD AUTO: 0.3 % — SIGNIFICANT CHANGE UP (ref 0–0.9)
LYMPHOCYTES # BLD AUTO: 0.89 K/UL — LOW (ref 1–3.3)
LYMPHOCYTES # BLD AUTO: 27.7 % — SIGNIFICANT CHANGE UP (ref 13–44)
MCHC RBC-ENTMCNC: 31.4 PG — SIGNIFICANT CHANGE UP (ref 27–34)
MCHC RBC-ENTMCNC: 33.5 GM/DL — SIGNIFICANT CHANGE UP (ref 32–36)
MCV RBC AUTO: 93.8 FL — SIGNIFICANT CHANGE UP (ref 80–100)
MONOCYTES # BLD AUTO: 0.39 K/UL — SIGNIFICANT CHANGE UP (ref 0–0.9)
MONOCYTES NFR BLD AUTO: 12.1 % — SIGNIFICANT CHANGE UP (ref 2–14)
NEUTROPHILS # BLD AUTO: 1.87 K/UL — SIGNIFICANT CHANGE UP (ref 1.8–7.4)
NEUTROPHILS NFR BLD AUTO: 58.4 % — SIGNIFICANT CHANGE UP (ref 43–77)
NRBC # BLD: 0 /100 WBCS — SIGNIFICANT CHANGE UP (ref 0–0)
PLATELET # BLD AUTO: 105 K/UL — LOW (ref 150–400)
RBC # BLD: 3.88 M/UL — SIGNIFICANT CHANGE UP (ref 3.8–5.2)
RBC # FLD: 14.9 % — HIGH (ref 10.3–14.5)
WBC # BLD: 3.21 K/UL — LOW (ref 3.8–10.5)
WBC # FLD AUTO: 3.21 K/UL — LOW (ref 3.8–10.5)

## 2024-05-01 LAB
ALBUMIN SERPL ELPH-MCNC: 3.9 G/DL — SIGNIFICANT CHANGE UP (ref 3.3–5)
ALP SERPL-CCNC: 195 U/L — HIGH (ref 40–120)
ALT FLD-CCNC: 41 U/L — SIGNIFICANT CHANGE UP (ref 10–45)
ANION GAP SERPL CALC-SCNC: 10 MMOL/L — SIGNIFICANT CHANGE UP (ref 5–17)
AST SERPL-CCNC: 49 U/L — HIGH (ref 10–40)
BILIRUB SERPL-MCNC: 0.5 MG/DL — SIGNIFICANT CHANGE UP (ref 0.2–1.2)
BUN SERPL-MCNC: 10 MG/DL — SIGNIFICANT CHANGE UP (ref 7–23)
CALCIUM SERPL-MCNC: 9.2 MG/DL — SIGNIFICANT CHANGE UP (ref 8.4–10.5)
CHLORIDE SERPL-SCNC: 107 MMOL/L — SIGNIFICANT CHANGE UP (ref 96–108)
CO2 SERPL-SCNC: 23 MMOL/L — SIGNIFICANT CHANGE UP (ref 22–31)
CREAT SERPL-MCNC: 0.55 MG/DL — SIGNIFICANT CHANGE UP (ref 0.5–1.3)
EGFR: 107 ML/MIN/1.73M2 — SIGNIFICANT CHANGE UP
GLUCOSE SERPL-MCNC: 150 MG/DL — HIGH (ref 70–99)
MAGNESIUM SERPL-MCNC: 2.2 MG/DL — SIGNIFICANT CHANGE UP (ref 1.6–2.6)
PHOSPHATE SERPL-MCNC: 4.2 MG/DL — SIGNIFICANT CHANGE UP (ref 2.5–4.5)
POTASSIUM SERPL-MCNC: 4.1 MMOL/L — SIGNIFICANT CHANGE UP (ref 3.5–5.3)
POTASSIUM SERPL-SCNC: 4.1 MMOL/L — SIGNIFICANT CHANGE UP (ref 3.5–5.3)
PROT SERPL-MCNC: 6.1 G/DL — SIGNIFICANT CHANGE UP (ref 6–8.3)
SODIUM SERPL-SCNC: 141 MMOL/L — SIGNIFICANT CHANGE UP (ref 135–145)

## 2024-05-02 ENCOUNTER — APPOINTMENT (OUTPATIENT)
Dept: INFUSION THERAPY | Facility: CANCER CENTER | Age: 59
End: 2024-05-02

## 2024-05-02 ENCOUNTER — APPOINTMENT (OUTPATIENT)
Dept: HEMATOLOGY ONCOLOGY | Facility: CLINIC | Age: 59
End: 2024-05-02
Payer: COMMERCIAL

## 2024-05-02 VITALS
HEART RATE: 67 BPM | WEIGHT: 123 LBS | BODY MASS INDEX: 22.63 KG/M2 | OXYGEN SATURATION: 99 % | TEMPERATURE: 98.7 F | DIASTOLIC BLOOD PRESSURE: 62 MMHG | SYSTOLIC BLOOD PRESSURE: 99 MMHG

## 2024-05-02 PROCEDURE — 85027 COMPLETE CBC AUTOMATED: CPT

## 2024-05-02 PROCEDURE — 83735 ASSAY OF MAGNESIUM: CPT

## 2024-05-02 PROCEDURE — 96523 IRRIG DRUG DELIVERY DEVICE: CPT

## 2024-05-02 PROCEDURE — 84100 ASSAY OF PHOSPHORUS: CPT

## 2024-05-02 PROCEDURE — 80053 COMPREHEN METABOLIC PANEL: CPT

## 2024-05-02 PROCEDURE — 96416 CHEMO PROLONG INFUSE W/PUMP: CPT

## 2024-05-02 PROCEDURE — 96409 CHEMO IV PUSH SNGL DRUG: CPT

## 2024-05-02 PROCEDURE — 96367 TX/PROPH/DG ADDL SEQ IV INF: CPT

## 2024-05-02 PROCEDURE — 99214 OFFICE O/P EST MOD 30 MIN: CPT

## 2024-05-02 PROCEDURE — G2211 COMPLEX E/M VISIT ADD ON: CPT | Mod: NC,1L

## 2024-05-02 PROCEDURE — 96375 TX/PRO/DX INJ NEW DRUG ADDON: CPT

## 2024-05-02 NOTE — REASON FOR VISIT
[Follow-Up Visit] : a follow-up [[employer] ] : provided by [employer]   [Time Spent: ____ minutes] : Total time spent using  services: [unfilled] minutes. The patient's primary language is not English thus required  services. [FreeTextEntry2] : Colon cancer  [Interpreters_IDNumber] : [id number] [Interpreters_FullName] : Sai  [TWNoteComboBox1] : Slovak

## 2024-05-02 NOTE — RESULTS/DATA
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  Imaging was negative for metastatic disease. She is now s/p large bowel rectosigmoid excision 10/12/23 w/ Dr. Gutierrez which revealed Infiltrating moderately differentiated adenocarcinoma measuring 3.0 cm, infiltrates into the muscularis propria, 8/26 lymph nodes are positive for metastatic carcinoma.  LVI neg.  pT2N2b, prognostic stage IIIB Given multiple positive lymph nodes and stage III disease would recommend adjuvant chemotherapy with FOLFOX x6 months. In patients w/ high risk stage III colorectal cancer, 3 months of FOLFOX was inferior to 6 months of FOLFOX.   Received D1C1 FOLFOX 11/7/23 w/ good tolerance.  Completed 12 cycles of FOLFOX on 4/30/24 which she overall tolerated well.  Following w/ Dr. De León- will need CAT scan of abdomen pelvis p.o. IV and rectal contrast for ileostomy reversal. Ordered at today's visit.  Imaging scheduled for 5/15/24.  Follow up w/ Dr. De León 1 week after imaging.   Can consider future screening with signatera ctDNA q 3-6 months.

## 2024-05-02 NOTE — PHYSICAL EXAM
[Fully active, able to carry on all pre-disease performance without restriction] : Status 0 - Fully active, able to carry on all pre-disease performance without restriction [Normal] : affect appropriate [de-identified] : well appearing female, NAD, pleasant  [de-identified] : s/p ostomy placement, healing well, nontender

## 2024-05-02 NOTE — HISTORY OF PRESENT ILLNESS
[de-identified] : Referred by: Dr. Alphonso De León  PCP: Dr. Crump Here today w/ her daughter Crystal Jackson (daughter) -  [phone]  Cancer Summary:  DIAGNOSIS: Rectosigmoid cancer  PROCEDURE AND DATE: large bowel resection/rectosigmoid excision w/ Dr. De León on 10/12/23 PATHOLOGY:  infiltrating mod diff adenocarcinoma, measuring 3cm which infiltrates the muscularis propria. 8/26 LN+ STAGE: pT2N2b, prognostic stage IIIB POSTOPERATIVE TREATMENT: Chemotherapy:  C1 FOLFOX on 11/7/23 C2 FOLFOX 11/29/23 C3 FOLFOX 12/12/23 C4 FOLFOX 12/26/23 C5 FOLFOX 1/9/24 C6 FOLFOX 1/30/24 C7 FOLFOX 2/13/24 C8 FOLFOX 2/27/24 C9 FOLFOX 3/19/24 C10 FOLFOX 4/2/24 C11 5FU 4/16/24 C12 5FU 4/30/24 Radiation: n/a STATUS: FLORIN    Ms. Valdes presented at age 57 in October 2023 for evaluation of rectosigmoid cancer.  The patient has a medical history of pre-diabetes.  Surgical history: recent large bowel excision with colostomy placement.  Jess was undergoing evaluation for uterine prolapse- recommended her for screening colonoscopy during that workup which revealed a mass in the rectosigmoid colon. Colonoscopy was performed on 9/11/23 by Dr. De León- biopsy was performed which revealed severely ulcerated superficial fragments of adenocarcinoma, moderately differentiated, in a background of tubular adenoma with high-grade dysplasia, microsatellite stable.  Imaging with pelvic MRI on September 27, 2023 revealed a high rectal cancer, stage T1-T2, no concerning lymph nodes were seen at that time.  CT chest abdomen pelvis revealed no evidence of metastatic disease.  She was seen initially by Dr. Jatin Pedro at [facility]. She ultimately underwent large bowel resection/rectosigmoid excision w/ Dr. De León on 10/12/23 which revealed infiltrating mod diff adenocarcinoma, measuring 3cm which infiltrates the muscularis propria.  8 out of 26 lymph nodes were positive for metastatic carcinoma. She was referred today for discussion of adjuvant therapy.  At today's visit she is here with her daughter.  They are Faroese-speaking.  She denies recent headaches, visual changes, balance issues, CP, cough, SOB, n/v/d, constipation, unintentional weight loss or new bone or back pain.  She is healing well from surgery.  She does report that they are running out colostomy bags.   Imaging:  MR pelvis 9/27/23- high rectal cancer, stage T1/T2, N0 CT C/A/P 9/27/23- no evidence of metastatic disease in the CAP  Path: Sigmoid colon mass biopsy 9/11/23 Large bowel rectosigmoid excision 10/12/23- Infiltrating moderately differentiated adenocarcinoma measuring 3.0 cm, infiltrates into the muscularis propria -8/26 lymph nodes are positive for metastatic carcinoma.  LVI neg.  2.  Large bowel, proximal anastomosis, excision: Negative for malignancy 3. Large bowel, distal anastomosis, excision: Negative for malignancy  pT2N2b, prognostic stage IIIB  Genetics: not done    HCM: - COVID vaccination:  Moderna x2, no boosters - Flu shots - 10/12/23 - Colonoscopy:  9/11/23 -> rectosigmoid mass - Gyn:  2023 - no hx abnormal Paps - Mammo:  2023 - wnl - Lung cancer screen:  n/a - DEXA:  none prior   SH: - Occupation:  not currently working  - Living situation:  Lives in [place] with her son and family - Smoking/etoh/illicits:  Never smoker, rare etoh, no illicits  - Exercise: walking   FH: - no known family history of cancer    [de-identified] : Presents for follow up on 5/2/24 for rectosigmoid cancer.  Received D1C1 FOLFOX on 11/7/23  Completed 12 cycles of FOLFOX on 4/30/24.  She is feeling well at today's visit. She is very happy to have completed chemo. She remained active throughout her treatment with exercise and walking.  Following w/ Dr. De León- will need CAT scan of abdomen pelvis p.o. IV and rectal contrast for ileostomy reversal.  Laboratory studies reviewed at today's visit and notable for: WBC 3.21,  Hb 12.2, plt 105

## 2024-05-07 ENCOUNTER — APPOINTMENT (OUTPATIENT)
Dept: OBGYN | Facility: CLINIC | Age: 59
End: 2024-05-07

## 2024-05-07 ENCOUNTER — APPOINTMENT (OUTPATIENT)
Dept: OBGYN | Facility: CLINIC | Age: 59
End: 2024-05-07
Payer: COMMERCIAL

## 2024-05-07 ENCOUNTER — APPOINTMENT (OUTPATIENT)
Age: 59
End: 2024-05-07

## 2024-05-07 VITALS
DIASTOLIC BLOOD PRESSURE: 83 MMHG | WEIGHT: 122 LBS | BODY MASS INDEX: 23.03 KG/M2 | HEIGHT: 61 IN | SYSTOLIC BLOOD PRESSURE: 119 MMHG

## 2024-05-07 DIAGNOSIS — C19 MALIGNANT NEOPLASM OF RECTOSIGMOID JUNCTION: ICD-10-CM

## 2024-05-07 DIAGNOSIS — N95.0 POSTMENOPAUSAL BLEEDING: ICD-10-CM

## 2024-05-07 PROCEDURE — 58558Z: CUSTOM

## 2024-05-07 RX ORDER — ACETAMINOPHEN 325 MG/1
TABLET, FILM COATED ORAL
Refills: 0 | Status: ACTIVE | COMMUNITY

## 2024-05-07 RX ORDER — DOXYCYCLINE HYCLATE 100 MG/1
100 CAPSULE ORAL
Refills: 0 | Status: COMPLETED | COMMUNITY

## 2024-05-07 NOTE — PHYSICAL EXAM
[Chaperone Present] : A chaperone was present in the examining room during all aspects of the physical examination [FreeTextEntry2] : Nicolette [Appropriately responsive] : appropriately responsive [Alert] : alert [No Acute Distress] : no acute distress [Oriented x3] : oriented x3

## 2024-05-07 NOTE — PLAN
[FreeTextEntry1] : Endosee and EMBx obtained without difficulty  notify patient of results  follow up pap/annual June 2024  follow up with colorectal surgeon and then follow up with Uro Gyn for prolapsed uterus

## 2024-05-07 NOTE — PROCEDURE
[Hysteroscopy] : Hysteroscopy [Time out performed] : Pre-procedure time out performed.  Patient's name, date of birth and procedure confirmed. [Consent Obtained] : Consent obtained [Postmenopausal bleeding] : postmenopausal bleeding [Risks] : risks [Benefits] : benefits [Alternatives] : alternatives [Patient] : patient [Infection] : infection [Bleeding] : bleeding [Allergic Reaction] : allergic reaction [flexible] : Using aseptic technique a hysteroscopy was performed using a flexible hysteroscope [Sent to Pathology] : specimen was placed in buffered formalin and sent for pathology [Antibiotics given] : antibiotics not given [Hemostasis obtained] : hemostasis obtained [Tolerated Well] : Patient tolerated the procedure well [de-identified] : cervix prepped with betadine anterior lip of cervix grasped with single tooth tenaculum cervix prolapsed to introitus uterus sounded to  8.5   cm cervix dilated slightly  Endosee hysteroscopy inserted without difficulty Endometrial lining appears atrophic  no obvious lesion, fibroids, or polyps both ostia visualized  Pipelle used to obtain EMBx   scant tissue obtained   [de-identified] : endometrial biopsy via Pipelle [de-identified] : follow up results and notify patient  follow up with colorectal surgeon and then follow up with Uro Gyn for prolapsed uterus

## 2024-05-07 NOTE — HISTORY OF PRESENT ILLNESS
[FreeTextEntry1] : 59 yo postmenopausal female with vaginal bleeding x 2 days Patient s/p surgery and chemotherapy for colon cancer dx last year.  Patient recently completed chemotherapy  Patient counseled and consented for hysteroscopy and EMBx  Daughter in the room and both, patient and daughter, voiced understanding.

## 2024-05-09 ENCOUNTER — APPOINTMENT (OUTPATIENT)
Dept: INFUSION THERAPY | Facility: CANCER CENTER | Age: 59
End: 2024-05-09

## 2024-05-14 ENCOUNTER — APPOINTMENT (OUTPATIENT)
Dept: CT IMAGING | Facility: CLINIC | Age: 59
End: 2024-05-14

## 2024-05-14 DIAGNOSIS — Z78.9 OTHER SPECIFIED HEALTH STATUS: ICD-10-CM

## 2024-05-15 ENCOUNTER — RESULT REVIEW (OUTPATIENT)
Age: 59
End: 2024-05-15

## 2024-05-15 ENCOUNTER — APPOINTMENT (OUTPATIENT)
Dept: CT IMAGING | Facility: CLINIC | Age: 59
End: 2024-05-15
Payer: COMMERCIAL

## 2024-05-15 PROCEDURE — 74177 CT ABD & PELVIS W/CONTRAST: CPT

## 2024-05-21 ENCOUNTER — APPOINTMENT (OUTPATIENT)
Dept: COLORECTAL SURGERY | Facility: CLINIC | Age: 59
End: 2024-05-21
Payer: COMMERCIAL

## 2024-05-21 DIAGNOSIS — Z93.2 ILEOSTOMY STATUS: ICD-10-CM

## 2024-05-21 DIAGNOSIS — C20 MALIGNANT NEOPLASM OF RECTUM: ICD-10-CM

## 2024-05-21 PROCEDURE — 99215 OFFICE O/P EST HI 40 MIN: CPT

## 2024-05-21 NOTE — PHYSICAL EXAM
[Abdomen Masses] : No abdominal masses [Abdomen Tenderness] : ~T No ~M abdominal tenderness [JVD] : no jugular venous distention  [Normal Breath Sounds] : Normal breath sounds [Normal Heart Sounds] : normal heart sounds [No Rash or Lesion] : No rash or lesion [Alert] : alert [Oriented to Person] : oriented to person [Oriented to Place] : oriented to place [Oriented to Time] : oriented to time [Calm] : calm [de-identified] : Ileostomy right lower quadrant [de-identified] : Looks well in no distress, of stated age.

## 2024-05-21 NOTE — ASSESSMENT
[FreeTextEntry1] : 58-year-old female with history of rectal cancer treated with low anterior resection diverting loop ileostomy has recently completed chemotherapy and is doing well wishes to have her ileostomy reversed.  Recommend laparoscopic possible open reversal of ileostomy with small bowel resection and anastomosis.  Risk and benefits of the surgery have been discussed which include bleeding, infection, sepsis, multiorgan failure, inadvertent injury including hollow viscus, solid organ, ureter neurovascular and neurological structures, DVT PE, heart attack, stroke, hernias, recurrence of tumor leakage of anastomosis requiring reoperation possible stoma and death. Presurgical testing with CBC BMP PT PTT chest x-ray EKG prophylactic antibiotics

## 2024-05-21 NOTE — HISTORY OF PRESENT ILLNESS
[FreeTextEntry1] : 58-year-old female with previous rectal cancer treated with low anterior resection diverting loop ileostomy.  She is completing her chemotherapy wishes to have her ileostomy reversed.

## 2024-05-28 LAB — CORE LAB BIOPSY: NORMAL

## 2024-05-30 ENCOUNTER — RESULT REVIEW (OUTPATIENT)
Age: 59
End: 2024-05-30

## 2024-05-30 ENCOUNTER — OUTPATIENT (OUTPATIENT)
Dept: OUTPATIENT SERVICES | Facility: HOSPITAL | Age: 59
LOS: 1 days | End: 2024-05-30
Payer: COMMERCIAL

## 2024-05-30 VITALS
TEMPERATURE: 98 F | HEART RATE: 71 BPM | HEIGHT: 61.5 IN | SYSTOLIC BLOOD PRESSURE: 140 MMHG | OXYGEN SATURATION: 98 % | WEIGHT: 123.46 LBS | DIASTOLIC BLOOD PRESSURE: 90 MMHG | RESPIRATION RATE: 16 BRPM

## 2024-05-30 DIAGNOSIS — Z90.49 ACQUIRED ABSENCE OF OTHER SPECIFIED PARTS OF DIGESTIVE TRACT: Chronic | ICD-10-CM

## 2024-05-30 DIAGNOSIS — Z98.890 OTHER SPECIFIED POSTPROCEDURAL STATES: Chronic | ICD-10-CM

## 2024-05-30 DIAGNOSIS — Z29.9 ENCOUNTER FOR PROPHYLACTIC MEASURES, UNSPECIFIED: ICD-10-CM

## 2024-05-30 DIAGNOSIS — Z01.818 ENCOUNTER FOR OTHER PREPROCEDURAL EXAMINATION: ICD-10-CM

## 2024-05-30 DIAGNOSIS — Z95.828 PRESENCE OF OTHER VASCULAR IMPLANTS AND GRAFTS: Chronic | ICD-10-CM

## 2024-05-30 LAB
A1C WITH ESTIMATED AVERAGE GLUCOSE RESULT: 5.7 % — HIGH (ref 4–5.6)
ALBUMIN SERPL ELPH-MCNC: 4 G/DL — SIGNIFICANT CHANGE UP (ref 3.3–5)
ALP SERPL-CCNC: 199 U/L — HIGH (ref 40–120)
ALT FLD-CCNC: 49 U/L — SIGNIFICANT CHANGE UP (ref 12–78)
ANION GAP SERPL CALC-SCNC: 4 MMOL/L — LOW (ref 5–17)
APTT BLD: 34 SEC — SIGNIFICANT CHANGE UP (ref 24.5–35.6)
AST SERPL-CCNC: 57 U/L — HIGH (ref 15–37)
BASOPHILS # BLD AUTO: 0.04 K/UL — SIGNIFICANT CHANGE UP (ref 0–0.2)
BASOPHILS NFR BLD AUTO: 0.6 % — SIGNIFICANT CHANGE UP (ref 0–2)
BILIRUB SERPL-MCNC: 0.9 MG/DL — SIGNIFICANT CHANGE UP (ref 0.2–1.2)
BLD GP AB SCN SERPL QL: SIGNIFICANT CHANGE UP
BUN SERPL-MCNC: 12 MG/DL — SIGNIFICANT CHANGE UP (ref 7–23)
CALCIUM SERPL-MCNC: 9.8 MG/DL — SIGNIFICANT CHANGE UP (ref 8.5–10.1)
CHLORIDE SERPL-SCNC: 110 MMOL/L — HIGH (ref 96–108)
CO2 SERPL-SCNC: 25 MMOL/L — SIGNIFICANT CHANGE UP (ref 22–31)
CREAT SERPL-MCNC: 0.64 MG/DL — SIGNIFICANT CHANGE UP (ref 0.5–1.3)
EGFR: 103 ML/MIN/1.73M2 — SIGNIFICANT CHANGE UP
EOSINOPHIL # BLD AUTO: 0.06 K/UL — SIGNIFICANT CHANGE UP (ref 0–0.5)
EOSINOPHIL NFR BLD AUTO: 1 % — SIGNIFICANT CHANGE UP (ref 0–6)
ESTIMATED AVERAGE GLUCOSE: 117 MG/DL — HIGH (ref 68–114)
GLUCOSE SERPL-MCNC: 110 MG/DL — HIGH (ref 70–99)
HCT VFR BLD CALC: 42.5 % — SIGNIFICANT CHANGE UP (ref 34.5–45)
HCV AB S/CO SERPL IA: 0.17 S/CO — SIGNIFICANT CHANGE UP (ref 0–0.99)
HCV AB SERPL-IMP: SIGNIFICANT CHANGE UP
HGB BLD-MCNC: 14.5 G/DL — SIGNIFICANT CHANGE UP (ref 11.5–15.5)
IMM GRANULOCYTES NFR BLD AUTO: 0.3 % — SIGNIFICANT CHANGE UP (ref 0–0.9)
INR BLD: 0.95 RATIO — SIGNIFICANT CHANGE UP (ref 0.85–1.18)
LYMPHOCYTES # BLD AUTO: 1.22 K/UL — SIGNIFICANT CHANGE UP (ref 1–3.3)
LYMPHOCYTES # BLD AUTO: 19.3 % — SIGNIFICANT CHANGE UP (ref 13–44)
MANUAL SMEAR VERIFICATION: SIGNIFICANT CHANGE UP
MCHC RBC-ENTMCNC: 31 PG — SIGNIFICANT CHANGE UP (ref 27–34)
MCHC RBC-ENTMCNC: 34.1 GM/DL — SIGNIFICANT CHANGE UP (ref 32–36)
MCV RBC AUTO: 90.8 FL — SIGNIFICANT CHANGE UP (ref 80–100)
MICROCYTES BLD QL: SLIGHT — SIGNIFICANT CHANGE UP
MONOCYTES # BLD AUTO: 0.52 K/UL — SIGNIFICANT CHANGE UP (ref 0–0.9)
MONOCYTES NFR BLD AUTO: 8.2 % — SIGNIFICANT CHANGE UP (ref 2–14)
NEUTROPHILS # BLD AUTO: 4.45 K/UL — SIGNIFICANT CHANGE UP (ref 1.8–7.4)
NEUTROPHILS NFR BLD AUTO: 70.6 % — SIGNIFICANT CHANGE UP (ref 43–77)
PLAT MORPH BLD: NORMAL — SIGNIFICANT CHANGE UP
PLATELET # BLD AUTO: 189 K/UL — SIGNIFICANT CHANGE UP (ref 150–400)
POTASSIUM SERPL-MCNC: 4.2 MMOL/L — SIGNIFICANT CHANGE UP (ref 3.5–5.3)
POTASSIUM SERPL-SCNC: 4.2 MMOL/L — SIGNIFICANT CHANGE UP (ref 3.5–5.3)
PROT SERPL-MCNC: 8 GM/DL — SIGNIFICANT CHANGE UP (ref 6–8.3)
PROTHROM AB SERPL-ACNC: 10.8 SEC — SIGNIFICANT CHANGE UP (ref 9.5–13)
RBC # BLD: 4.68 M/UL — SIGNIFICANT CHANGE UP (ref 3.8–5.2)
RBC # FLD: 13.5 % — SIGNIFICANT CHANGE UP (ref 10.3–14.5)
RBC BLD AUTO: SIGNIFICANT CHANGE UP
SODIUM SERPL-SCNC: 139 MMOL/L — SIGNIFICANT CHANGE UP (ref 135–145)
WBC # BLD: 6.31 K/UL — SIGNIFICANT CHANGE UP (ref 3.8–10.5)
WBC # FLD AUTO: 6.31 K/UL — SIGNIFICANT CHANGE UP (ref 3.8–10.5)

## 2024-05-30 PROCEDURE — 36415 COLL VENOUS BLD VENIPUNCTURE: CPT

## 2024-05-30 PROCEDURE — 85025 COMPLETE CBC W/AUTO DIFF WBC: CPT

## 2024-05-30 PROCEDURE — 86850 RBC ANTIBODY SCREEN: CPT

## 2024-05-30 PROCEDURE — 86900 BLOOD TYPING SEROLOGIC ABO: CPT

## 2024-05-30 PROCEDURE — 93010 ELECTROCARDIOGRAM REPORT: CPT

## 2024-05-30 PROCEDURE — T1013: CPT

## 2024-05-30 PROCEDURE — 85730 THROMBOPLASTIN TIME PARTIAL: CPT

## 2024-05-30 PROCEDURE — 83036 HEMOGLOBIN GLYCOSYLATED A1C: CPT

## 2024-05-30 PROCEDURE — 85610 PROTHROMBIN TIME: CPT

## 2024-05-30 PROCEDURE — 86803 HEPATITIS C AB TEST: CPT

## 2024-05-30 PROCEDURE — 93005 ELECTROCARDIOGRAM TRACING: CPT

## 2024-05-30 PROCEDURE — 71046 X-RAY EXAM CHEST 2 VIEWS: CPT | Mod: 26

## 2024-05-30 PROCEDURE — 71046 X-RAY EXAM CHEST 2 VIEWS: CPT

## 2024-05-30 PROCEDURE — 99214 OFFICE O/P EST MOD 30 MIN: CPT | Mod: 25

## 2024-05-30 PROCEDURE — 80053 COMPREHEN METABOLIC PANEL: CPT

## 2024-05-30 PROCEDURE — 86901 BLOOD TYPING SEROLOGIC RH(D): CPT

## 2024-05-30 RX ORDER — OMEPRAZOLE 10 MG/1
1 CAPSULE, DELAYED RELEASE ORAL
Refills: 0 | DISCHARGE

## 2024-05-30 NOTE — H&P PST ADULT - NSICDXPASTMEDICALHX_GEN_ALL_CORE_FT
PAST MEDICAL HISTORY:  Bilateral cataracts     GERD (gastroesophageal reflux disease)     Prediabetes     Rash of face     Rectal bleeding     Rectosigmoid cancer     Uterine prolapse      PAST MEDICAL HISTORY:  Bilateral cataracts     GERD (gastroesophageal reflux disease)     History of chemotherapy     Ileostomy present     Prediabetes     Rash of face     Rectal bleeding     Rectosigmoid cancer     Uterine prolapse

## 2024-05-30 NOTE — H&P PST ADULT - HISTORY OF PRESENT ILLNESS
58 y.o WD, WN Indonesian speaking female presents to [facility] with recent dx of rectal cancer. Patient reports a change in her bowel habits and rectal bleeding. She had a colonoscopy which revealed a rectal mass with +biopsy.  Patient has followed with colorectal surgeon and scheduled for a Laparoscopic possible Open Low Anterior Resection, Mobilization of Splenic Flexure, Rigid Proctosigmoidoscopy, Lymph Node Dissection, and possible Ileostomy  58 y.o WD, WN Gibraltarian speaking female [facility] with recent dx of rectal cancer. Patient reports a change in her bowel habits and rectal bleeding. She had a colonoscopy which revealed a rectal mass with +biopsy.  Patient followed with colorectal surgeon and had a colon resection, ileostomy and chemotherapy. She has done well, completed treatment and now scheduled for a Laparoscopic possible Open Reversal of Ileostomy, Small Bowel Resection, Anastomosis

## 2024-05-30 NOTE — H&P PST ADULT - PROBLEM SELECTOR PLAN 1
Caprini score 7 or higher: High risk, pharmocologic VTE prophylaxis indicated for most patients (in the absence of contraindications)

## 2024-05-30 NOTE — H&P PST ADULT - ASSESSMENT
58 y.o female scheduled for  Laparoscopic possible Open Reversal of Ileostomy, Small Bowel Resection, Anastomosis   Plan  1. Stop all NSAIDS, herbal supplements and vitamins for 7 days.  2. NPO at midnight.  3. Take the following medications ( ) with small sips of water on the morning of your procedure/surgery.  4. Use EZ sponges as directed  5. Labs, EKG as per surgeon  6. PMD visit for optimization prior to surgery as per surgeon       58 y.o female scheduled for  Laparoscopic possible Open Reversal of Ileostomy, Small Bowel Resection, Anastomosis   Plan  1. Stop all NSAIDS, herbal supplements and vitamins for 7 days.  2. NPO at midnight.  3. Take the following medications --none--- with small sips of water on the morning of your procedure/surgery.  4. Use EZ sponges as directed  5. Labs, EKG, CXR  as per surgeon  6. PMD DANE Cha visit for optimization prior to surgery as per surgeon  7. Preprocedure instructions provided; Instructed to follow any bowel prep instructions given by surgeon     [facility] SCORE Version 2013    AGE RELATED RISK FACTORS                                                             [x ] Age 41-60 years             [1 point]  [ ] Age: 61-74 years            [2 points]                 [ ] Age 75 years or over     [3 points]             DISEASE RELATED RISK FACTORS                                                       [ ] Current swollen legs (pitting edema of any level)     [1 point]                     [x ] Varicose veins (visible, bulging vein, not spider veins or surgically removed veins)   [1 point]                                 [x ] BMI > 25 Kg/m2                       [1 point]  [ ] BMI > 40 kg/m2                       [1 point]                                 [ ] Serious infection (within past month, requiring hospitalization and IV antibiotics)    [1 point]                     [ ] Lung disease ( interstitial: COPD, emphysema, sarcoid, 9-11 illness, NOT asthma)       [1 point]                                                                          [ ] Acute myocardial infarction (within past month)      [1 point]                  [ ] Congestive heart failure (episode within past month or taking CHF meds)                   [1 point]         [ ] Inflammatory bowel disease (Crohns disease or ulcerative colitis, not irritable bowel syndrome)   [1 point]                  [ ] Central venous access, PICC line or Port (within past month)     [2 points]                                                             [ ] Stroke (within past month)     [5 points]    [x ] Previous or present malignancy (includes melanoma but not basal cell carcinoma, each incidence of cancer scores 2 points-not metastases)  [2 points]                                                                                                                                                         HEMATOLOGY RELATED FACTORS                                                         [ ] History of DVT or PE (including superficial venous thrombosis)    [3 points]                    [ ] Positive family history for DVT or PE (includes first-, second-, and third-degree relatives)   [3 points]   [ ] Personal or family history of genetic thrombophilia (family history counts only if it has not been confirmed that patient does not have this genetic marker)                       [ ] Prothrombin 99585V mutation                   [3 points]                           [ ] Factor V Leiden                                               [3 points]            [ ] Antithrombin III deficiency                           [3 points]         [ ] Protein C & S deficiency                                [3 points]              [ ] Dysfibrinogenemia                                         [3 points]  [ ] Personal history of acquired thrombophilia                       [ ] Lupus anticoagulant                                       [3 points]                                                                  [ ] Anticardiolipin antibodies                             [3 points]              [ ] Antiphospholipid antibodies                         [3 points]                                                         [ ] High homocysteine in the blood                  [3 points]                                                    [ ] Myeloproliferative disorders (including thrombocytosis)    [3 points]            [ ] HIV                                                                     [3 points]                                                    [ ] Heparin induced thrombocytopenia            [3 points]                                        MOBILITY RELATED FACTORS  [ ] Bed rest or restricted mobility (inability to ambulate 30 feet continuously or removable leg brace) for less than 72 hours    [1 point]  [ ] Nonremovable plaster cast or mold that prevents calf muscle use   [2 points]  [ ] Bed bound  or restricted mobility for more than 72 hours                  [2 points]    GENDER SPECIFIC FACTORS  [ ] Pregnancy or had a baby within the last month   [1 point]  [ ] Hormone therapy  or oral contraception               [1 point]  [ ] Current use of estrogen-like drugs (raloxifine, tamoxifen, anastrozole, letrozole)                                [1 point]  [ ] History of unexplained stillborn infant, premature birth with toxemia or growth-restricted infant   [1 point]  [ ] Recurrent spontaneous abortions (3 or more)      [1 point]    OTHER RISK FACTORS                                         [ ] Current smoker (includes vaping and smoking marijuana)      [1 point]  [ ] Diabetes requiring insulin     [1 point]                   [x ] Chemotherapy (includes methotrexate for rheumatoid arthritis, hydroxyurea for thrombocytosis)    [1 point]  [ ] Blood transfusion(s)               [1 point]    SURGERY RELATED RISK FACTORS  [ ]  section within the last month                    [1 point]  [ ] Minor surgery is planned (less than 45 minutes)     [1 point]  [ ] Past major surgery (longer than 45 minutes) within past month  [2 points]  [ x] Planned major surgery lasting more than 45 minutes (includes laparoscopic and arthroscopic; do not add to the "5" for hip and knee replacement)    [2 points]  [ ] Length of surgery over 2 hours (includes anesthesia time; do not add to the "5" for hip and knee replacement)   [1 point]  [ ] Elective hip or knee joint replacement surgery         [5 points]                                               TRAUMA RELATED RISK FACTORS  [ ] Fracture of the hip, pelvis, or leg                       [5 points]  [ ] Spinal cord injury resulting in paralysis (within the past month)    [5 points]  [ ] Paralysis  (within the past month)                      [5 points]  [ ] Multiple trauma (within the past month)         [5 Points]    Total Score [    8    ]    Total hip and total knee replacement:  Caprini score 9 or less: LOW risk  Caprini score 10 or highter: HIGH RISK    Caprini score 0-2: Low Risk, NO VTE prophylaxis required for most patients, encourage ambulation  Caprini score 3-6: Moderate Risk , pharmacologic VTE prophylaxis is indicated for most patients (in the absence of contraindications)  Caprini score 7 or higher: High risk, pharmocologic VTE prophylaxis indicated for most patients (in the absence of contraindications)

## 2024-05-30 NOTE — H&P PST ADULT - NSICDXPASTSURGICALHX_GEN_ALL_CORE_FT
PAST SURGICAL HISTORY:  H/O colonoscopy      PAST SURGICAL HISTORY:  H/O colonoscopy     History of colon resection     History of ileostomy     Port-A-Cath in place

## 2024-05-31 DIAGNOSIS — Z01.818 ENCOUNTER FOR OTHER PREPROCEDURAL EXAMINATION: ICD-10-CM

## 2024-06-05 RX ORDER — SODIUM CHLORIDE 9 MG/ML
1000 INJECTION, SOLUTION INTRAVENOUS
Refills: 0 | Status: DISCONTINUED | OUTPATIENT
Start: 2024-06-06 | End: 2024-06-06

## 2024-06-05 RX ORDER — FENTANYL CITRATE 50 UG/ML
50 INJECTION INTRAVENOUS
Refills: 0 | Status: DISCONTINUED | OUTPATIENT
Start: 2024-06-06 | End: 2024-06-06

## 2024-06-05 RX ORDER — ONDANSETRON 8 MG/1
4 TABLET, FILM COATED ORAL ONCE
Refills: 0 | Status: DISCONTINUED | OUTPATIENT
Start: 2024-06-06 | End: 2024-06-06

## 2024-06-05 RX ORDER — OXYCODONE HYDROCHLORIDE 5 MG/1
5 TABLET ORAL ONCE
Refills: 0 | Status: DISCONTINUED | OUTPATIENT
Start: 2024-06-06 | End: 2024-06-06

## 2024-06-06 ENCOUNTER — RESULT REVIEW (OUTPATIENT)
Age: 59
End: 2024-06-06

## 2024-06-06 ENCOUNTER — APPOINTMENT (OUTPATIENT)
Dept: COLORECTAL SURGERY | Facility: HOSPITAL | Age: 59
End: 2024-06-06

## 2024-06-06 ENCOUNTER — INPATIENT (INPATIENT)
Facility: HOSPITAL | Age: 59
LOS: 2 days | Discharge: HOME | DRG: 861 | End: 2024-06-09
Attending: COLON & RECTAL SURGERY | Admitting: COLON & RECTAL SURGERY
Payer: COMMERCIAL

## 2024-06-06 VITALS
SYSTOLIC BLOOD PRESSURE: 154 MMHG | DIASTOLIC BLOOD PRESSURE: 97 MMHG | TEMPERATURE: 98 F | RESPIRATION RATE: 16 BRPM | HEIGHT: 62 IN | OXYGEN SATURATION: 100 % | HEART RATE: 73 BPM | WEIGHT: 147.71 LBS

## 2024-06-06 DIAGNOSIS — Z95.828 PRESENCE OF OTHER VASCULAR IMPLANTS AND GRAFTS: Chronic | ICD-10-CM

## 2024-06-06 DIAGNOSIS — C20 MALIGNANT NEOPLASM OF RECTUM: ICD-10-CM

## 2024-06-06 DIAGNOSIS — Z92.3 PERSONAL HISTORY OF IRRADIATION: ICD-10-CM

## 2024-06-06 DIAGNOSIS — Z90.49 ACQUIRED ABSENCE OF OTHER SPECIFIED PARTS OF DIGESTIVE TRACT: Chronic | ICD-10-CM

## 2024-06-06 DIAGNOSIS — Z98.890 OTHER SPECIFIED POSTPROCEDURAL STATES: Chronic | ICD-10-CM

## 2024-06-06 LAB
GLUCOSE BLDC GLUCOMTR-MCNC: 80 MG/DL — SIGNIFICANT CHANGE UP (ref 70–99)
GLUCOSE BLDC GLUCOMTR-MCNC: 97 MG/DL — SIGNIFICANT CHANGE UP (ref 70–99)
HCG UR QL: NEGATIVE — SIGNIFICANT CHANGE UP

## 2024-06-06 PROCEDURE — 36415 COLL VENOUS BLD VENIPUNCTURE: CPT

## 2024-06-06 PROCEDURE — 86923 COMPATIBILITY TEST ELECTRIC: CPT

## 2024-06-06 PROCEDURE — C9399: CPT

## 2024-06-06 PROCEDURE — P9040: CPT

## 2024-06-06 PROCEDURE — 85025 COMPLETE CBC W/AUTO DIFF WBC: CPT

## 2024-06-06 PROCEDURE — 82962 GLUCOSE BLOOD TEST: CPT

## 2024-06-06 PROCEDURE — 84100 ASSAY OF PHOSPHORUS: CPT

## 2024-06-06 PROCEDURE — 81025 URINE PREGNANCY TEST: CPT

## 2024-06-06 PROCEDURE — 97162 PT EVAL MOD COMPLEX 30 MIN: CPT | Mod: GP

## 2024-06-06 PROCEDURE — 88304 TISSUE EXAM BY PATHOLOGIST: CPT

## 2024-06-06 PROCEDURE — 44227 LAP CLOSE ENTEROSTOMY: CPT

## 2024-06-06 PROCEDURE — 36430 TRANSFUSION BLD/BLD COMPNT: CPT

## 2024-06-06 PROCEDURE — 86901 BLOOD TYPING SEROLOGIC RH(D): CPT

## 2024-06-06 PROCEDURE — 97116 GAIT TRAINING THERAPY: CPT | Mod: GP

## 2024-06-06 PROCEDURE — 80048 BASIC METABOLIC PNL TOTAL CA: CPT

## 2024-06-06 PROCEDURE — 85027 COMPLETE CBC AUTOMATED: CPT

## 2024-06-06 PROCEDURE — 86900 BLOOD TYPING SEROLOGIC ABO: CPT

## 2024-06-06 PROCEDURE — C9290: CPT

## 2024-06-06 PROCEDURE — 99222 1ST HOSP IP/OBS MODERATE 55: CPT

## 2024-06-06 PROCEDURE — 83735 ASSAY OF MAGNESIUM: CPT

## 2024-06-06 PROCEDURE — 86850 RBC ANTIBODY SCREEN: CPT

## 2024-06-06 PROCEDURE — 88304 TISSUE EXAM BY PATHOLOGIST: CPT | Mod: 26

## 2024-06-06 PROCEDURE — C9113: CPT

## 2024-06-06 PROCEDURE — C1889: CPT

## 2024-06-06 RX ORDER — OXYCODONE HYDROCHLORIDE 5 MG/1
5 TABLET ORAL EVERY 4 HOURS
Refills: 0 | Status: DISCONTINUED | OUTPATIENT
Start: 2024-06-06 | End: 2024-06-09

## 2024-06-06 RX ORDER — KETOROLAC TROMETHAMINE 30 MG/ML
15 SYRINGE (ML) INJECTION EVERY 6 HOURS
Refills: 0 | Status: DISCONTINUED | OUTPATIENT
Start: 2024-06-06 | End: 2024-06-07

## 2024-06-06 RX ORDER — CEFOTETAN DISODIUM 1 G
2 VIAL (EA) INJECTION ONCE
Refills: 0 | Status: COMPLETED | OUTPATIENT
Start: 2024-06-07 | End: 2024-06-07

## 2024-06-06 RX ORDER — ACETAMINOPHEN 500 MG
1000 TABLET ORAL EVERY 6 HOURS
Refills: 0 | Status: DISCONTINUED | OUTPATIENT
Start: 2024-06-06 | End: 2024-06-09

## 2024-06-06 RX ORDER — ALVIMOPAN 12 MG/1
12 CAPSULE ORAL EVERY 12 HOURS
Refills: 0 | Status: DISCONTINUED | OUTPATIENT
Start: 2024-06-07 | End: 2024-06-07

## 2024-06-06 RX ORDER — SODIUM CHLORIDE 9 MG/ML
1000 INJECTION, SOLUTION INTRAVENOUS
Refills: 0 | Status: DISCONTINUED | OUTPATIENT
Start: 2024-06-06 | End: 2024-06-06

## 2024-06-06 RX ORDER — PANTOPRAZOLE SODIUM 20 MG/1
40 TABLET, DELAYED RELEASE ORAL EVERY 24 HOURS
Refills: 0 | Status: DISCONTINUED | OUTPATIENT
Start: 2024-06-06 | End: 2024-06-09

## 2024-06-06 RX ORDER — ONDANSETRON 8 MG/1
4 TABLET, FILM COATED ORAL EVERY 8 HOURS
Refills: 0 | Status: DISCONTINUED | OUTPATIENT
Start: 2024-06-06 | End: 2024-06-09

## 2024-06-06 RX ORDER — ENOXAPARIN SODIUM 100 MG/ML
40 INJECTION SUBCUTANEOUS EVERY 24 HOURS
Refills: 0 | Status: DISCONTINUED | OUTPATIENT
Start: 2024-06-06 | End: 2024-06-08

## 2024-06-06 RX ORDER — HEPARIN SODIUM 5000 [USP'U]/ML
5000 INJECTION INTRAVENOUS; SUBCUTANEOUS ONCE
Refills: 0 | Status: COMPLETED | OUTPATIENT
Start: 2024-06-06 | End: 2024-06-06

## 2024-06-06 RX ORDER — ALVIMOPAN 12 MG/1
12 CAPSULE ORAL ONCE
Refills: 0 | Status: COMPLETED | OUTPATIENT
Start: 2024-06-06 | End: 2024-06-06

## 2024-06-06 RX ADMIN — ENOXAPARIN SODIUM 40 MILLIGRAM(S): 100 INJECTION SUBCUTANEOUS at 21:41

## 2024-06-06 RX ADMIN — SODIUM CHLORIDE 75 MILLILITER(S): 9 INJECTION, SOLUTION INTRAVENOUS at 12:21

## 2024-06-06 RX ADMIN — Medication 15 MILLIGRAM(S): at 17:56

## 2024-06-06 RX ADMIN — PANTOPRAZOLE SODIUM 40 MILLIGRAM(S): 20 TABLET, DELAYED RELEASE ORAL at 21:42

## 2024-06-06 RX ADMIN — FENTANYL CITRATE 50 MICROGRAM(S): 50 INJECTION INTRAVENOUS at 12:20

## 2024-06-06 RX ADMIN — FENTANYL CITRATE 50 MICROGRAM(S): 50 INJECTION INTRAVENOUS at 14:00

## 2024-06-06 RX ADMIN — Medication 15 MILLIGRAM(S): at 17:57

## 2024-06-06 RX ADMIN — HEPARIN SODIUM 5000 UNIT(S): 5000 INJECTION INTRAVENOUS; SUBCUTANEOUS at 10:03

## 2024-06-06 RX ADMIN — ALVIMOPAN 12 MILLIGRAM(S): 12 CAPSULE ORAL at 10:03

## 2024-06-06 NOTE — CONSULT NOTE ADULT - NS ATTEND AMEND GEN_ALL_CORE FT
Chart and meds reviewed.  Case d/w DAVID Mcdowell.  Agree with the plan of care as outlined by DAVID Mcdowell.    58 F with Hx of HTN, HLD, GERD, pre-DM, rosacea, h/o rectosigmoid CA, S/P LAR, ileostomy, finished chemotherapy, now admitted for elective ileostomy reversal.      PLAN:  -  pain control  -  incentive spirometry  -  OOB to chair / ambulate  -  IVFs  -  wound care per colorectal  -  Entereg  -  full liquid diet  -  complete perioperative ABXs  -  check labs in am  -  strict Is/Os  -  DVT prophylaxis    d/w DAVID Mcdowell

## 2024-06-06 NOTE — BRIEF OPERATIVE NOTE - OPERATION/FINDINGS
ileostomy reversed, primary anastomosis intact with no bleeding in ileum, no liver metastasis, no abnormal finding , hemostasis established

## 2024-06-06 NOTE — CONSULT NOTE ADULT - SUBJECTIVE AND OBJECTIVE BOX
PCP:  Dr. 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 Pt is seen in PACU,  awake, Prydeinig speaking, no complaints presently, VSS, we are consulted for medical management    PMH: as above  PSH: LAR with ilesotomy, colonoscopy, port a cath placement  FAMILY HISTORY: father : unknown, Mother alive: HTN  Social History: never a smoker, rare ETOH, denies rec drugs  Allergies: No Known Allergies  Home Medications:  takes no meds:  (2024 09:51)      Vital Signs Last 24 Hrs  T(C): 36.6 (2024 10:11), Max: 36.6 (2024 10:11)  T(F): 97.8 (2024 10:11), Max: 97.8 (2024 10:11)  HR: 73 (2024 10:11) (73 - 73)  BP: 154/97 (2024 10:11) (154/97 - 154/97)  BP(mean): --  RR: 16 (2024 10:11) (16 - 16)  SpO2: 100% (2024 10:11) (100% - 100%)    REVIEW OF SYSTEMS:   All 10 systems reviewed in detailed and found to be negative with the exception of what has already been described above      PHYSICAL EXAM:    GENERAL: Comfortable, no acute distress   HEAD:  Normocephalic, atraumatic  EYES: EOMI, PERRLA  HEENT: Moist mucous membranes  NECK: Supple, No JVD  NERVOUS SYSTEM:  Alert & Oriented X3, Motor Strength 5/5 B/L upper and lower extremities  CHEST/LUNG: Clear to auscultation bilaterally  HEART: Regular rate and rhythm  ABDOMEN: Soft, + post op tenderness, minimal bowel sounds, + NP seal in place with scant bloody drainage  GENITOURINARY: Voiding, no palpable bladder  EXTREMITIES:   No clubbing, cyanosis, or edema  MUSCULOSKELETAL- No muscle tenderness, no joint tenderness  SKIN-no rash    MEDICATIONS  (STANDING):  lactated ringers. 1000 milliLiter(s) (75 mL/Hr) IV Continuous <Continuous>    MEDICATIONS  (PRN):  fentaNYL    Injectable 50 MICROGram(s) IV Push every 5 minutes PRN Severe Pain (7 - 10)  ondansetron Injectable 4 milliGRAM(s) IV Push once PRN Nausea and/or Vomiting  oxyCODONE    IR 5 milliGRAM(s) Oral once PRN Moderate Pain (4 - 6)    LABS: preop labs reviewed      CAPRINI SCORE    AGE RELATED RISK FACTORS                                                             [x ] Age 41-60 years                                            (1 Point)  [ ] Age: 61-74 years                                           (2 Points)                 [ ] Age= 75 years                                                (3 Points)             DISEASE RELATED RISK FACTORS                                                       [ ] Edema in the lower extremities                 (1 Point)                     [ ] Varicose veins                                               (1 Point)                                 [ x] BMI > 25 Kg/m2                                            (1 Point)                                  [ ] Serious infection (ie PNA)                            (1 Point)                     [ ] Lung disease ( COPD, Emphysema)            (1 Point)                                                                          [ ] Acute myocardial infarction                         (1 Point)                  [ ] Congestive heart failure (in the previous month)  (1 Point)         [ ] Inflammatory bowel disease                            (1 Point)                  [ ] Central venous access, PICC or Port               (2 points)       (within the last month)                                                                [ ] Stroke (in the previous month)                        (5 Points)    [x ] Previous or present malignancy                       (2 points)                                                                                                                                                         HEMATOLOGY RELATED FACTORS                                                         [ ] Prior episodes of VTE                                     (3 Points)                     [ ] Positive family history for VTE                      (3 Points)                  [ ] Prothrombin 75932 A                                     (3 Points)                     [ ] Factor V Leiden                                                (3 Points)                        [ ] Lupus anticoagulants                                      (3 Points)                                                           [ ] Anticardiolipin antibodies                              (3 Points)                                                       [ ] High homocysteine in the blood                   (3 Points)                                             [ ] Other congenital or acquired thrombophilia      (3 Points)                                                [ ] Heparin induced thrombocytopenia                  (3 Points)                                        MOBILITY RELATED FACTORS  [ ] Bed rest                                                         (1 Point)  [ ] Plaster cast                                                    (2 points)  [ ] Bed bound for more than 72 hours           (2 Points)    GENDER SPECIFIC FACTORS  [ ] Pregnancy or had a baby within the last month   (1 Point)  [ ] Post-partum < 6 weeks                                   (1 Point)  [ ] Hormonal therapy  or oral contraception   (1 Point)  [ ] History of pregnancy complications              (1 point)  [ ] Unexplained or recurrent              (1 Point)    OTHER RISK FACTORS                                           (1 Point)  [ ] BMI >40, smoking, diabetes requiring insulin, chemotherapy  blood transfusions and length of surgery over 2 hours    SURGERY RELATED RISK FACTORS  [ ]  Section within the last month     (1 Point)  [ ] Minor surgery                                                  (1 Point)  [ ] Arthroscopic surgery                                       (2 Points)  x[ ] Planned major surgery lasting more            (2 Points)      than 45 minutes     [ ] Elective hip or knee joint replacement       (5 points)       surgery                                                TRAUMA RELATED RISK FACTORS  [ ] Fracture of the hip, pelvis, or leg                       (5 Points)  [ ] Spinal cord injury resulting in paralysis             (5 points)       (in the previous month)    [ ] Paralysis  (less than 1 month)                             (5 Points)  [ ] Multiple Trauma within 1 month                        (5 Points)    Total Score [ 6   ]    Caprini Score 0-2: Low Risk, NO VTE prophylaxis required for most patients, encourage ambulation  Caprini Score 3-6: Moderate Risk , pharmacologic VTE prophylaxis is indicated for most patients (in the absence of contraindications)  Caprini Score Greater than or =7: High risk, pharmocologic VTE prophylaxis indicated for most patients (in the absence of contraindications)        IMPRESSION: 57 yo female with PMH: HTN, HLD, GERD, pre-dm, rosacea, with h/o rectosigmoid Ca, S/P LAR, ileostomy, finished chemotherapy, now here for ileostomy reversal.  Rectosigmoid ca, s/p ileotomy reversal  -pain control with oxycodone  -IVF's  -Monitor I& O  -Monitor Cbc, BMP  -BGM per CRS protocol  -Cont Abxs  -diet per CRS  -Enterag    # H/O HTN/HLD mentioned in chart review    was on lisinopril and atorvastatin   not clear if she is on these meds  current med rec states pt is not on these meds currently  will d/w pt when she is more awake.    #GERD  ppi    #Pre-dm  A1C 5.8  dietary/lifestyle modifications    #VTE prophylaxis  Cap score #6  lovenox  Venodynes  Amb      Thank you for the consult, will follow

## 2024-06-07 ENCOUNTER — TRANSCRIPTION ENCOUNTER (OUTPATIENT)
Age: 59
End: 2024-06-07

## 2024-06-07 LAB
ANION GAP SERPL CALC-SCNC: 3 MMOL/L — LOW (ref 5–17)
BUN SERPL-MCNC: 15 MG/DL — SIGNIFICANT CHANGE UP (ref 7–23)
CALCIUM SERPL-MCNC: 8.5 MG/DL — SIGNIFICANT CHANGE UP (ref 8.5–10.1)
CHLORIDE SERPL-SCNC: 108 MMOL/L — SIGNIFICANT CHANGE UP (ref 96–108)
CO2 SERPL-SCNC: 27 MMOL/L — SIGNIFICANT CHANGE UP (ref 22–31)
CREAT SERPL-MCNC: 0.94 MG/DL — SIGNIFICANT CHANGE UP (ref 0.5–1.3)
EGFR: 71 ML/MIN/1.73M2 — SIGNIFICANT CHANGE UP
GLUCOSE SERPL-MCNC: 138 MG/DL — HIGH (ref 70–99)
HCT VFR BLD CALC: 29.9 % — LOW (ref 34.5–45)
HGB BLD-MCNC: 10.4 G/DL — LOW (ref 11.5–15.5)
MAGNESIUM SERPL-MCNC: 2.1 MG/DL — SIGNIFICANT CHANGE UP (ref 1.6–2.6)
MCHC RBC-ENTMCNC: 31.4 PG — SIGNIFICANT CHANGE UP (ref 27–34)
MCHC RBC-ENTMCNC: 34.8 GM/DL — SIGNIFICANT CHANGE UP (ref 32–36)
MCV RBC AUTO: 90.3 FL — SIGNIFICANT CHANGE UP (ref 80–100)
PHOSPHATE SERPL-MCNC: 3.7 MG/DL — SIGNIFICANT CHANGE UP (ref 2.5–4.5)
PLATELET # BLD AUTO: 183 K/UL — SIGNIFICANT CHANGE UP (ref 150–400)
POTASSIUM SERPL-MCNC: 3.7 MMOL/L — SIGNIFICANT CHANGE UP (ref 3.5–5.3)
POTASSIUM SERPL-SCNC: 3.7 MMOL/L — SIGNIFICANT CHANGE UP (ref 3.5–5.3)
RBC # BLD: 3.31 M/UL — LOW (ref 3.8–5.2)
RBC # FLD: 13.2 % — SIGNIFICANT CHANGE UP (ref 10.3–14.5)
SODIUM SERPL-SCNC: 138 MMOL/L — SIGNIFICANT CHANGE UP (ref 135–145)
WBC # BLD: 7.35 K/UL — SIGNIFICANT CHANGE UP (ref 3.8–10.5)
WBC # FLD AUTO: 7.35 K/UL — SIGNIFICANT CHANGE UP (ref 3.8–10.5)

## 2024-06-07 PROCEDURE — 99232 SBSQ HOSP IP/OBS MODERATE 35: CPT

## 2024-06-07 RX ORDER — OXYCODONE AND ACETAMINOPHEN 5; 325 MG/1; MG/1
1 TABLET ORAL
Qty: 20 | Refills: 0
Start: 2024-06-07

## 2024-06-07 RX ADMIN — OXYCODONE HYDROCHLORIDE 5 MILLIGRAM(S): 5 TABLET ORAL at 16:18

## 2024-06-07 RX ADMIN — Medication 15 MILLIGRAM(S): at 01:38

## 2024-06-07 RX ADMIN — OXYCODONE HYDROCHLORIDE 5 MILLIGRAM(S): 5 TABLET ORAL at 07:50

## 2024-06-07 RX ADMIN — Medication 15 MILLIGRAM(S): at 06:40

## 2024-06-07 RX ADMIN — Medication 100 GRAM(S): at 01:38

## 2024-06-07 RX ADMIN — ENOXAPARIN SODIUM 40 MILLIGRAM(S): 100 INJECTION SUBCUTANEOUS at 22:37

## 2024-06-07 RX ADMIN — OXYCODONE HYDROCHLORIDE 5 MILLIGRAM(S): 5 TABLET ORAL at 08:50

## 2024-06-07 RX ADMIN — PANTOPRAZOLE SODIUM 40 MILLIGRAM(S): 20 TABLET, DELAYED RELEASE ORAL at 22:37

## 2024-06-07 RX ADMIN — Medication 15 MILLIGRAM(S): at 02:10

## 2024-06-07 RX ADMIN — Medication 15 MILLIGRAM(S): at 06:07

## 2024-06-07 NOTE — DISCHARGE NOTE PROVIDER - NSDCCPGOAL_GEN_ALL_CORE_FT
To get better and follow your care plan as instructed. To get better and follow your care plan as instructed.  Take off pressure dressing before your 1st shower. Reapply abdominal binder afterwards.

## 2024-06-07 NOTE — PHYSICAL THERAPY INITIAL EVALUATION ADULT - PERTINENT HX OF CURRENT PROBLEM, REHAB EVAL
Pt is a 59 y/o F, To  for  Lap ileostomy reversal on 6/6, Rectal CA  PMH: HTN, HLD, GERD, pre-dm, rosacea, with h/o rectosigmoid Ca, S/P LAR, ileostomy, finished chemotherapy,

## 2024-06-07 NOTE — PHYSICAL THERAPY INITIAL EVALUATION ADULT - WEIGHT-BEARING RESTRICTIONS: GAIT, REHAB EVAL
Dr. Aranda Office requesting to have patient seen today for left knee aspiration and cortisone      full weight-bearing

## 2024-06-07 NOTE — DISCHARGE NOTE PROVIDER - CARE PROVIDER_API CALL
Alphonso De León  Colon/Rectal Surgery  [address], [facility] [place] [address]  Phone: [phone]  Fax: [phone]  Follow Up Time: 2 weeks

## 2024-06-07 NOTE — PHYSICAL THERAPY INITIAL EVALUATION ADULT - MODALITIES TREATMENT COMMENTS
Pt brought back to supine for rest, +alarm, WILLIAM, Rn aware, BP stable, feels much better in bed at this time, pain decreased to 5/10 in supine.

## 2024-06-07 NOTE — PHYSICAL THERAPY INITIAL EVALUATION ADULT - NSPTDMEREC_GEN_A_CORE
Pt will need a RW at home due to her dx of reversal of ostomy/weakness to complete MRADL's at home/rolling walker

## 2024-06-07 NOTE — DISCHARGE NOTE PROVIDER - NSDCFUSCHEDAPPT_GEN_ALL_CORE_FT
Mikel Elizabeth  [facility]  OBGYNGEN [address]  Scheduled Appointment: 06/25/2024    Sheila Yang  [facility]  Scheduled Appointment: 08/05/2024    [facility]  Scheduled Appointment: 08/05/2024

## 2024-06-07 NOTE — PHYSICAL THERAPY INITIAL EVALUATION ADULT - LIVES WITH, PROFILE
Pt lives w/ her dgt who assists her during her care, states she has 1 HALIE home and her dgt assists her./children

## 2024-06-07 NOTE — PHYSICAL THERAPY INITIAL EVALUATION ADULT - GENERAL OBSERVATIONS, REHAB EVAL
Pt seen on 1N, NAD, Stateless speaking used PI ID #[id number]. Pt very calm and cooperative, reports 5/10 pain in abd at rest and 8/10 pain w/ ambulation, Pt also became very dizzy after 25 ft of ambulation and looked as if she would faint, sat pt in a chair and brought back to supine in bed, pt felt much better, RN aware and BP taken 114/63 O2 92% RA

## 2024-06-07 NOTE — DISCHARGE NOTE PROVIDER - NSDCACTIVITY_GEN_ALL_CORE
Showering allowed/Stairs allowed/Driving allowed/Walking - Indoors allowed/No heavy lifting/straining/Walking - Outdoors allowed/Follow Instructions Provided by your Surgical Team/Activity as tolerated

## 2024-06-07 NOTE — DISCHARGE NOTE PROVIDER - NSDCFUADDINST_GEN_ALL_CORE_FT
May take tylenol and/or motrin for mild pain.  may shower daily  cover old ileostomy site with dry gauze daily and change as needed  may take percocet for moderate pain  warm compress to area as needed.

## 2024-06-07 NOTE — DISCHARGE NOTE PROVIDER - NSDCMRMEDTOKEN_GEN_ALL_CORE_FT
oxycodone-acetaminophen 5 mg-325 mg oral tablet: 1 tab(s) orally every 6 hours as needed for  moderate pain MDD: 004  takes no meds:

## 2024-06-07 NOTE — DISCHARGE NOTE PROVIDER - HOSPITAL COURSE
Pt presented electively for ileostomy reversal. Postop course uneventful, with stable vital signs, pain control, and bowel function return.

## 2024-06-08 LAB
ANION GAP SERPL CALC-SCNC: 4 MMOL/L — LOW (ref 5–17)
BUN SERPL-MCNC: 9 MG/DL — SIGNIFICANT CHANGE UP (ref 7–23)
CALCIUM SERPL-MCNC: 8.2 MG/DL — LOW (ref 8.5–10.1)
CHLORIDE SERPL-SCNC: 106 MMOL/L — SIGNIFICANT CHANGE UP (ref 96–108)
CO2 SERPL-SCNC: 26 MMOL/L — SIGNIFICANT CHANGE UP (ref 22–31)
CREAT SERPL-MCNC: 0.75 MG/DL — SIGNIFICANT CHANGE UP (ref 0.5–1.3)
EGFR: 93 ML/MIN/1.73M2 — SIGNIFICANT CHANGE UP
GLUCOSE SERPL-MCNC: 105 MG/DL — HIGH (ref 70–99)
HCT VFR BLD CALC: 22.4 % — LOW (ref 34.5–45)
HCT VFR BLD CALC: 25.3 % — LOW (ref 34.5–45)
HGB BLD-MCNC: 7.6 G/DL — LOW (ref 11.5–15.5)
HGB BLD-MCNC: 8.5 G/DL — LOW (ref 11.5–15.5)
MAGNESIUM SERPL-MCNC: 2.4 MG/DL — SIGNIFICANT CHANGE UP (ref 1.6–2.6)
MCHC RBC-ENTMCNC: 30.5 PG — SIGNIFICANT CHANGE UP (ref 27–34)
MCHC RBC-ENTMCNC: 31.3 PG — SIGNIFICANT CHANGE UP (ref 27–34)
MCHC RBC-ENTMCNC: 33.6 GM/DL — SIGNIFICANT CHANGE UP (ref 32–36)
MCHC RBC-ENTMCNC: 33.9 GM/DL — SIGNIFICANT CHANGE UP (ref 32–36)
MCV RBC AUTO: 90.7 FL — SIGNIFICANT CHANGE UP (ref 80–100)
MCV RBC AUTO: 92.2 FL — SIGNIFICANT CHANGE UP (ref 80–100)
PHOSPHATE SERPL-MCNC: 2.7 MG/DL — SIGNIFICANT CHANGE UP (ref 2.5–4.5)
PLATELET # BLD AUTO: 136 K/UL — LOW (ref 150–400)
PLATELET # BLD AUTO: 163 K/UL — SIGNIFICANT CHANGE UP (ref 150–400)
POTASSIUM SERPL-MCNC: 4.1 MMOL/L — SIGNIFICANT CHANGE UP (ref 3.5–5.3)
POTASSIUM SERPL-SCNC: 4.1 MMOL/L — SIGNIFICANT CHANGE UP (ref 3.5–5.3)
RBC # BLD: 2.43 M/UL — LOW (ref 3.8–5.2)
RBC # BLD: 2.79 M/UL — LOW (ref 3.8–5.2)
RBC # FLD: 13.3 % — SIGNIFICANT CHANGE UP (ref 10.3–14.5)
RBC # FLD: 13.4 % — SIGNIFICANT CHANGE UP (ref 10.3–14.5)
SODIUM SERPL-SCNC: 136 MMOL/L — SIGNIFICANT CHANGE UP (ref 135–145)
WBC # BLD: 5.78 K/UL — SIGNIFICANT CHANGE UP (ref 3.8–10.5)
WBC # BLD: 6.07 K/UL — SIGNIFICANT CHANGE UP (ref 3.8–10.5)
WBC # FLD AUTO: 5.78 K/UL — SIGNIFICANT CHANGE UP (ref 3.8–10.5)
WBC # FLD AUTO: 6.07 K/UL — SIGNIFICANT CHANGE UP (ref 3.8–10.5)

## 2024-06-08 PROCEDURE — 99232 SBSQ HOSP IP/OBS MODERATE 35: CPT

## 2024-06-08 RX ADMIN — PANTOPRAZOLE SODIUM 40 MILLIGRAM(S): 20 TABLET, DELAYED RELEASE ORAL at 22:09

## 2024-06-08 RX ADMIN — OXYCODONE HYDROCHLORIDE 5 MILLIGRAM(S): 5 TABLET ORAL at 08:22

## 2024-06-08 RX ADMIN — OXYCODONE HYDROCHLORIDE 5 MILLIGRAM(S): 5 TABLET ORAL at 07:25

## 2024-06-09 ENCOUNTER — TRANSCRIPTION ENCOUNTER (OUTPATIENT)
Age: 59
End: 2024-06-09

## 2024-06-09 VITALS
TEMPERATURE: 99 F | DIASTOLIC BLOOD PRESSURE: 66 MMHG | RESPIRATION RATE: 18 BRPM | HEART RATE: 86 BPM | OXYGEN SATURATION: 98 % | SYSTOLIC BLOOD PRESSURE: 123 MMHG

## 2024-06-09 LAB
ANION GAP SERPL CALC-SCNC: 3 MMOL/L — LOW (ref 5–17)
BASOPHILS # BLD AUTO: 0.01 K/UL — SIGNIFICANT CHANGE UP (ref 0–0.2)
BASOPHILS NFR BLD AUTO: 0.2 % — SIGNIFICANT CHANGE UP (ref 0–2)
BUN SERPL-MCNC: 7 MG/DL — SIGNIFICANT CHANGE UP (ref 7–23)
CALCIUM SERPL-MCNC: 8.6 MG/DL — SIGNIFICANT CHANGE UP (ref 8.5–10.1)
CHLORIDE SERPL-SCNC: 110 MMOL/L — HIGH (ref 96–108)
CO2 SERPL-SCNC: 25 MMOL/L — SIGNIFICANT CHANGE UP (ref 22–31)
CREAT SERPL-MCNC: 0.52 MG/DL — SIGNIFICANT CHANGE UP (ref 0.5–1.3)
EGFR: 108 ML/MIN/1.73M2 — SIGNIFICANT CHANGE UP
EOSINOPHIL # BLD AUTO: 0.22 K/UL — SIGNIFICANT CHANGE UP (ref 0–0.5)
EOSINOPHIL NFR BLD AUTO: 4.2 % — SIGNIFICANT CHANGE UP (ref 0–6)
GLUCOSE SERPL-MCNC: 102 MG/DL — HIGH (ref 70–99)
HCT VFR BLD CALC: 26.4 % — LOW (ref 34.5–45)
HCT VFR BLD CALC: 27.7 % — LOW (ref 34.5–45)
HGB BLD-MCNC: 9 G/DL — LOW (ref 11.5–15.5)
HGB BLD-MCNC: 9.6 G/DL — LOW (ref 11.5–15.5)
IMM GRANULOCYTES NFR BLD AUTO: 0.2 % — SIGNIFICANT CHANGE UP (ref 0–0.9)
LYMPHOCYTES # BLD AUTO: 1.16 K/UL — SIGNIFICANT CHANGE UP (ref 1–3.3)
LYMPHOCYTES # BLD AUTO: 21.9 % — SIGNIFICANT CHANGE UP (ref 13–44)
MAGNESIUM SERPL-MCNC: 2.3 MG/DL — SIGNIFICANT CHANGE UP (ref 1.6–2.6)
MCHC RBC-ENTMCNC: 30.4 PG — SIGNIFICANT CHANGE UP (ref 27–34)
MCHC RBC-ENTMCNC: 31 PG — SIGNIFICANT CHANGE UP (ref 27–34)
MCHC RBC-ENTMCNC: 34.1 GM/DL — SIGNIFICANT CHANGE UP (ref 32–36)
MCHC RBC-ENTMCNC: 34.7 GM/DL — SIGNIFICANT CHANGE UP (ref 32–36)
MCV RBC AUTO: 89.2 FL — SIGNIFICANT CHANGE UP (ref 80–100)
MCV RBC AUTO: 89.4 FL — SIGNIFICANT CHANGE UP (ref 80–100)
MONOCYTES # BLD AUTO: 0.56 K/UL — SIGNIFICANT CHANGE UP (ref 0–0.9)
MONOCYTES NFR BLD AUTO: 10.6 % — SIGNIFICANT CHANGE UP (ref 2–14)
NEUTROPHILS # BLD AUTO: 3.34 K/UL — SIGNIFICANT CHANGE UP (ref 1.8–7.4)
NEUTROPHILS NFR BLD AUTO: 62.9 % — SIGNIFICANT CHANGE UP (ref 43–77)
PHOSPHATE SERPL-MCNC: 3 MG/DL — SIGNIFICANT CHANGE UP (ref 2.5–4.5)
PLATELET # BLD AUTO: 152 K/UL — SIGNIFICANT CHANGE UP (ref 150–400)
PLATELET # BLD AUTO: 169 K/UL — SIGNIFICANT CHANGE UP (ref 150–400)
POTASSIUM SERPL-MCNC: 3.8 MMOL/L — SIGNIFICANT CHANGE UP (ref 3.5–5.3)
POTASSIUM SERPL-SCNC: 3.8 MMOL/L — SIGNIFICANT CHANGE UP (ref 3.5–5.3)
RBC # BLD: 2.96 M/UL — LOW (ref 3.8–5.2)
RBC # BLD: 3.1 M/UL — LOW (ref 3.8–5.2)
RBC # FLD: 14.6 % — HIGH (ref 10.3–14.5)
RBC # FLD: 14.8 % — HIGH (ref 10.3–14.5)
SODIUM SERPL-SCNC: 138 MMOL/L — SIGNIFICANT CHANGE UP (ref 135–145)
WBC # BLD: 5.3 K/UL — SIGNIFICANT CHANGE UP (ref 3.8–10.5)
WBC # BLD: 6.74 K/UL — SIGNIFICANT CHANGE UP (ref 3.8–10.5)
WBC # FLD AUTO: 5.3 K/UL — SIGNIFICANT CHANGE UP (ref 3.8–10.5)
WBC # FLD AUTO: 6.74 K/UL — SIGNIFICANT CHANGE UP (ref 3.8–10.5)

## 2024-06-09 PROCEDURE — 99232 SBSQ HOSP IP/OBS MODERATE 35: CPT

## 2024-06-09 NOTE — DISCHARGE NOTE NURSING/CASE MANAGEMENT/SOCIAL WORK - NSDCPEFALRISK_GEN_ALL_CORE
For information on Fall & Injury Prevention, visit: [email or web address] OR  [email or web address] OR  https://www.cdc.gov/steadi/patient.html

## 2024-06-09 NOTE — DISCHARGE NOTE NURSING/CASE MANAGEMENT/SOCIAL WORK - PATIENT PORTAL LINK FT
You can access the FollowMyHealth Patient Portal offered by [facility] by registering at the following website: [email or web address]. By joining [employer]’s FollowMyHealth portal, you will also be able to view your health information using other applications (apps) compatible with our system.

## 2024-06-09 NOTE — PROGRESS NOTE ADULT - REASON FOR ADMISSION
Ileostomy reversal
h/o rectal Ca, with ileostomy, now s/P ileostomy reversal

## 2024-06-09 NOTE — PROGRESS NOTE ADULT - SUBJECTIVE AND OBJECTIVE BOX
PCP:  Dr. 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  Now s/p ileostomy reversal.      pt see at bedside, awake, alert, [place] speaking only. This NP able to speak with pt,  + BM, denies any blood in stool, + flatus, mouna CLD, no N/V, denies any CP or sob  6/8 - tolerating diet. no complaints. minimal pain     REVIEW OF SYSTEMS:   All 10 systems reviewed in detailed and found to be negative with the exception of what has already been described above    PHYSICAL EXAM:    Vital Signs Last 24 Hrs  T(C): 37.1 (08 Jun 2024 09:00), Max: 37.4 (07 Jun 2024 22:13)  T(F): 98.7 (08 Jun 2024 09:00), Max: 99.3 (07 Jun 2024 22:13)  HR: 89 (08 Jun 2024 09:00) (84 - 94)  BP: 103/59 (08 Jun 2024 09:00) (92/53 - 105/51)  BP(mean): --  RR: 18 (08 Jun 2024 09:00) (18 - 18)  SpO2: 99% (08 Jun 2024 09:00) (94% - 99%)    Parameters below as of 08 Jun 2024 09:00  Patient On (Oxygen Delivery Method): room air        GENERAL: Comfortable, no acute distress   HEAD:  Normocephalic, atraumatic  EYES: EOMI, PERRLA  HEENT: Moist mucous membranes  NECK: Supple, No JVD  NERVOUS SYSTEM:  Alert & Oriented X3, Motor Strength 5/5 B/L upper and lower extremities  CHEST/LUNG: Clear to auscultation bilaterally  HEART: Regular rate and rhythm  ABDOMEN: Soft, +BS.  left side echhymosis  GENITOURINARY: Voiding, no palpable bladder  EXTREMITIES:   No clubbing, cyanosis, or edema  MUSCULOSKELETAL- No muscle tenderness, no joint tenderness  SKIN-no rash    LABS:                                        8.5    5.78  )-----------( 163      ( 08 Jun 2024 06:55 )             25.3     06-08    136  |  106  |  9   ----------------------------<  105<H>  4.1   |  26  |  0.75    Ca    8.2<L>      08 Jun 2024 06:55  Phos  2.7     06-08  Mg     2.4     06-08              Urinalysis Basic - ( 08 Jun 2024 06:55 )    Color: x / Appearance: x / SG: x / pH: x  Gluc: 105 mg/dL / Ketone: x  / Bili: x / Urobili: x   Blood: x / Protein: x / Nitrite: x   Leuk Esterase: x / RBC: x / WBC x   Sq Epi: x / Non Sq Epi: x / Bacteria: x                IMPRESSION: 57 yo female with PMH: HTN, HLD, GERD, pre-dm, rosacea, with h/o rectosigmoid Ca, S/P LAR, ileostomy, finished chemotherapy, now here for ileostomy reversal.  Rectosigmoid ca, s/p ileotomy reversal  -pain control with oxycodone  -IVF's  -Monitor I& O  -Monitor Cbc, BMP  -BGM per CRS protocol  -Cont Abxs  -diet per CRS  -Enterag    # acute blood loss anemia  d/t surgery. monitor hgb   no need for transfusion presently    # H/O HTN/HLD mentioned in chart review   pt states she is no longer taking these meds  normotensive here    #GERD  ppi    #Pre-dm  A1C 5.8  dietary/lifestyle modifications    #VTE prophylaxis  Cap score #6  lovenox  Venodynes  Amb      dispo: per surgery, most likely today vs tomorrow.  monitor hgb        
  PCP:  Dr. 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  Now s/p ileostomy reversal.      pt see at bedside, awake, alert, Haitian speaking only. This NP able to speak with pt,  + BM, denies any blood in stool, + flatus, mouna CLD, no N/V, denies any CP or sob  6/8 - tolerating diet. no complaints. minimal pain   6/9 - ambulaing in hallway. minimal pain tolerated transfusion     REVIEW OF SYSTEMS:   All 10 systems reviewed in detailed and found to be negative with the exception of what has already been described above    PHYSICAL EXAM:    Vital Signs Last 24 Hrs  T(C): 37.1 (09 Jun 2024 08:30), Max: 37.6 (08 Jun 2024 22:31)  T(F): 98.8 (09 Jun 2024 08:30), Max: 99.7 (08 Jun 2024 22:31)  HR: 77 (09 Jun 2024 08:30) (77 - 101)  BP: 112/61 (09 Jun 2024 08:30) (102/51 - 132/73)  BP(mean): --  RR: 17 (09 Jun 2024 08:30) (17 - 18)  SpO2: 97% (09 Jun 2024 08:30) (95% - 97%)    Parameters below as of 09 Jun 2024 08:30  Patient On (Oxygen Delivery Method): room air          GENERAL: Comfortable, no acute distress   HEAD:  Normocephalic, atraumatic  EYES: EOMI, PERRLA  HEENT: Moist mucous membranes  NECK: Supple, No JVD  NERVOUS SYSTEM:  Alert & Oriented X3, Motor Strength 5/5 B/L upper and lower extremities  CHEST/LUNG: Clear to auscultation bilaterally  HEART: Regular rate and rhythm  ABDOMEN: Soft, +BS.  left side echhymosis with abdominal compression dressing in place   GENITOURINARY: Voiding, no palpable bladder  EXTREMITIES:   No clubbing, cyanosis, or edema  MUSCULOSKELETAL- No muscle tenderness, no joint tenderness  SKIN-no rash    LABS:                                        9.0    5.30  )-----------( 152      ( 09 Jun 2024 07:26 )             26.4     06-09    138  |  110<H>  |  7   ----------------------------<  102<H>  3.8   |  25  |  0.52    Ca    8.6      09 Jun 2024 07:26  Phos  3.0     06-09  Mg     2.3     06-09    Urinalysis Basic - ( 09 Jun 2024 07:26 )    Color: x / Appearance: x / SG: x / pH: x  Gluc: 102 mg/dL / Ketone: x  / Bili: x / Urobili: x   Blood: x / Protein: x / Nitrite: x   Leuk Esterase: x / RBC: x / WBC x   Sq Epi: x / Non Sq Epi: x / Bacteria: x                IMPRESSION: 57 yo female with PMH: HTN, HLD, GERD, pre-dm, rosacea, with h/o rectosigmoid Ca, S/P LAR, ileostomy, finished chemotherapy, now here for ileostomy reversal.  Rectosigmoid ca, s/p ileotomy reversal   -pain control with oxycodone  -Monitor I& O  -Monitor Cbc, BMP  -BGM per CRS protocol  -Cont Abxs  -diet per CRS  -Enterag    # acute blood loss anemia  - case d/w surgery and abdominal compression dressing in place  - s/p 1 unit prbc yesterday.  monitor h/h and transfuse hgb < 8      # H/O HTN/HLD mentioned in chart review   pt states she is no longer taking these meds  normotensive here    #GERD  ppi    #Pre-dm  A1C 5.8  dietary/lifestyle modifications    #VTE prophylaxis  Cap score #6  lovenox  Venodynes  Amb      dispo: per surgery, most likely today vs tomorrow.  monitor hgb        
  PCP:  Dr. 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  Now s/p ileostomy reversal.      pt see at bedside, awake, alert, Tanzanian speaking only. This NP able to speak with pt,  + BM, denies any blood in stool, + flatus, mouna CLD, no N/V, denies any CP or sob    REVIEW OF SYSTEMS:   All 10 systems reviewed in detailed and found to be negative with the exception of what has already been described above    PHYSICAL EXAM:    GENERAL: Comfortable, no acute distress   HEAD:  Normocephalic, atraumatic  EYES: EOMI, PERRLA  HEENT: Moist mucous membranes  NECK: Supple, No JVD  NERVOUS SYSTEM:  Alert & Oriented X3, Motor Strength 5/5 B/L upper and lower extremities  CHEST/LUNG: Clear to auscultation bilaterally  HEART: Regular rate and rhythm  ABDOMEN: Soft, + post op tenderness, +l bowel sounds, NP seal removed now with dressing/ tegaderm, scant amt serosanguinous drainage noted on dressing  GENITOURINARY: Voiding, no palpable bladder  EXTREMITIES:   No clubbing, cyanosis, or edema  MUSCULOSKELETAL- No muscle tenderness, no joint tenderness  SKIN-no rash    MEDICATIONS  (STANDING):  enoxaparin Injectable 40 milliGRAM(s) SubCutaneous every 24 hours  pantoprazole  Injectable 40 milliGRAM(s) IV Push every 24 hours    MEDICATIONS  (PRN):  acetaminophen   IVPB .. 1000 milliGRAM(s) IV Intermittent every 6 hours PRN Mild Pain (1 - 3)  ondansetron Injectable 4 milliGRAM(s) IV Push every 8 hours PRN Nausea and/or Vomiting  oxyCODONE    IR 5 milliGRAM(s) Oral every 4 hours PRN Moderate Pain (4 - 6)    LABS:                       10.4   7.35  )-----------( 183      ( 2024 07:26 )             29.9     06-07    138  |  108  |  15  ----------------------------<  138<H>  3.7   |  27  |  0.94    Ca    8.5      2024 07:26  Phos  3.7     06-07  Mg     2.1     06-07      CAPRINI SCORE    AGE RELATED RISK FACTORS                                                             [x ] Age 41-60 years                                            (1 Point)  [ ] Age: 61-74 years                                           (2 Points)                 [ ] Age= 75 years                                                (3 Points)             DISEASE RELATED RISK FACTORS                                                       [ ] Edema in the lower extremities                 (1 Point)                     [ ] Varicose veins                                               (1 Point)                                 [ x] BMI > 25 Kg/m2                                            (1 Point)                                  [ ] Serious infection (ie PNA)                            (1 Point)                     [ ] Lung disease ( COPD, Emphysema)            (1 Point)                                                                          [ ] Acute myocardial infarction                         (1 Point)                  [ ] Congestive heart failure (in the previous month)  (1 Point)         [ ] Inflammatory bowel disease                            (1 Point)                  [ ] Central venous access, PICC or Port               (2 points)       (within the last month)                                                                [ ] Stroke (in the previous month)                        (5 Points)    [x ] Previous or present malignancy                       (2 points)                                                                                                                                                         HEMATOLOGY RELATED FACTORS                                                         [ ] Prior episodes of VTE                                     (3 Points)                     [ ] Positive family history for VTE                      (3 Points)                  [ ] Prothrombin 34054 A                                     (3 Points)                     [ ] Factor V Leiden                                                (3 Points)                        [ ] Lupus anticoagulants                                      (3 Points)                                                           [ ] Anticardiolipin antibodies                              (3 Points)                                                       [ ] High homocysteine in the blood                   (3 Points)                                             [ ] Other congenital or acquired thrombophilia      (3 Points)                                                [ ] Heparin induced thrombocytopenia                  (3 Points)                                        MOBILITY RELATED FACTORS  [ ] Bed rest                                                         (1 Point)  [ ] Plaster cast                                                    (2 points)  [ ] Bed bound for more than 72 hours           (2 Points)    GENDER SPECIFIC FACTORS  [ ] Pregnancy or had a baby within the last month   (1 Point)  [ ] Post-partum < 6 weeks                                   (1 Point)  [ ] Hormonal therapy  or oral contraception   (1 Point)  [ ] History of pregnancy complications              (1 point)  [ ] Unexplained or recurrent              (1 Point)    OTHER RISK FACTORS                                           (1 Point)  [ ] BMI >40, smoking, diabetes requiring insulin, chemotherapy  blood transfusions and length of surgery over 2 hours    SURGERY RELATED RISK FACTORS  [ ]  Section within the last month     (1 Point)  [ ] Minor surgery                                                  (1 Point)  [ ] Arthroscopic surgery                                       (2 Points)  x[ ] Planned major surgery lasting more            (2 Points)      than 45 minutes     [ ] Elective hip or knee joint replacement       (5 points)       surgery                                                TRAUMA RELATED RISK FACTORS  [ ] Fracture of the hip, pelvis, or leg                       (5 Points)  [ ] Spinal cord injury resulting in paralysis             (5 points)       (in the previous month)    [ ] Paralysis  (less than 1 month)                             (5 Points)  [ ] Multiple Trauma within 1 month                        (5 Points)    Total Score [ 6   ]    Caprini Score 0-2: Low Risk, NO VTE prophylaxis required for most patients, encourage ambulation  Caprini Score 3-6: Moderate Risk , pharmacologic VTE prophylaxis is indicated for most patients (in the absence of contraindications)  Caprini Score Greater than or =7: High risk, pharmocologic VTE prophylaxis indicated for most patients (in the absence of contraindications)        IMPRESSION: 57 yo female with PMH: HTN, HLD, GERD, pre-dm, rosacea, with h/o rectosigmoid Ca, S/P LAR, ileostomy, finished chemotherapy, now here for ileostomy reversal.  Rectosigmoid ca, s/p ileotomy reversal  -pain control with oxycodone  -IVF's  -Monitor I& O  -Monitor Cbc, BMP  -BGM per CRS protocol  -Cont Abxs  -diet per CRS  -Enterag    # acute blood loss anemia  d/t surgery  no need for transfusion presently    # H/O HTN/HLD mentioned in chart review   pt states she is no longer taking these meds  normotensive here    #GERD  ppi    #Pre-dm  A1C 5.8  dietary/lifestyle modifications    #VTE prophylaxis  Cap score #6  lovenox  Venodynes  Amb      dispo: per surgery, most likely tomorrow        
[facility]   SURGERY DAILY PROGRESS NOTE    Subjective:  Patient seen and examined on morning rounds.   Resting in bed, sleeping. No acute distress.      MEDICATIONS  (STANDING):  alvimopan 12 milliGRAM(s) Oral every 12 hours  enoxaparin Injectable 40 milliGRAM(s) SubCutaneous every 24 hours  ketorolac   Injectable 15 milliGRAM(s) IV Push every 6 hours  pantoprazole  Injectable 40 milliGRAM(s) IV Push every 24 hours    MEDICATIONS  (PRN):  acetaminophen   IVPB .. 1000 milliGRAM(s) IV Intermittent every 6 hours PRN Mild Pain (1 - 3)  ondansetron Injectable 4 milliGRAM(s) IV Push every 8 hours PRN Nausea and/or Vomiting  oxyCODONE    IR 5 milliGRAM(s) Oral every 4 hours PRN Moderate Pain (4 - 6)    Vital Signs Last 24 Hrs  T(C): 36.9 (07 Jun 2024 04:36), Max: 37.1 (06 Jun 2024 20:28)  T(F): 98.4 (07 Jun 2024 04:36), Max: 98.8 (06 Jun 2024 20:28)  HR: 67 (07 Jun 2024 04:36) (58 - 81)  BP: 111/56 (07 Jun 2024 04:36) (111/56 - 154/97)  BP(mean): --  RR: 17 (07 Jun 2024 04:36) (14 - 20)  SpO2: 97% (07 Jun 2024 04:36) (95% - 100%)    Parameters below as of 07 Jun 2024 04:36  Patient On (Oxygen Delivery Method): room air      I&O's Detail    06 Jun 2024 07:01  -  07 Jun 2024 05:19  --------------------------------------------------------  IN:    Lactated Ringers: 600 mL    Oral Fluid: 270 mL    Other (mL): 1100 mL  Total IN: 1970 mL    OUT:    Voided (mL): 1600 mL  Total OUT: 1600 mL    Total NET: 370 mL        Daily Height in cm: 157.48 (06 Jun 2024 10:11)    Daily     LABS:                Physical Exam:   Gen: NAD  CVS: RRR  Respiratory: CTA B/L  Abdomen: soft, approp TTP, ND, NP seal in place  Ext: no edema, gross sensation intact, gross motor function intact    
Pt seen at bedside, resting comfortable  Tolerating diet, passing gas  Pt denies pain, nausea, vomiting, fever, chills    Vitals:  T(C): 37.6 ( @ 22:31), Max: 37.6 ( @ 22:31)  HR: 101 ( @ :31) (89 - 101)  BP: 112/62 ( @ 22:31) (102/51 - 132/73)  RR: 18 ( 22:31) (18 - 18)  SpO2: 95% (:31) (95% - 99%)     @ 07:01  -  0609 @ 01:50  --------------------------------------------------------  IN:    PRBCs (Packed Red Blood Cells): 318 mL  Total IN: 318 mL    OUT:  Total OUT: 0 mL    Total NET: 318 mL      Physical Exam:  General: AAOx3, Well developed, NAD  Chest: Normal respiratory effort  Heart: RRR  Abdomen: Soft, NTND, no masses, incision clean and dry, Left flank ecchymosis with fullness   Neuro/Psych: No localized deficits. Normal spech, normal tone  Skin: Normal, no rashes, no lesions noted.   Extremities: Warm, well perfused, no edema, Pulses intact    0608 @ 11:53                    7.6  CBC: 6.07>)-------(<136                     22.4                 - | - | -    CMP:  ----------------------< -               - | - | -                      Ca:-  Phos:-  Mg:-               -|      |-        LFTs:  ------|-|-----             -|      |-  0608 @ 06:55                    8.5  CBC: 5.78>)-------(<163                     25.3                 136 | 106 | 9    CMP:  ----------------------< 105               4.1 | 26 | 0.75                      Ca:8.2  Phos:2.7  M.4               -|      |-        LFTs:  ------|-|-----             -|      |-      Current Inpatient Medications:  acetaminophen   IVPB .. 1000 milliGRAM(s) IV Intermittent every 6 hours PRN  ondansetron Injectable 4 milliGRAM(s) IV Push every 8 hours PRN  oxyCODONE    IR 5 milliGRAM(s) Oral every 4 hours PRN  pantoprazole  Injectable 40 milliGRAM(s) IV Push every 24 hours        
SURGERY DAILY PROGRESS NOTE:     Subjective:  Patient seen and examined this AM at bedside. No acute events overnight and patient resting comfortably. Hay solids. Pain controlled. Ambulating. Denies fever/chills, shortness of breath, chest pain. VS reviewed    Objective:    MEDICATIONS  (STANDING):  enoxaparin Injectable 40 milliGRAM(s) SubCutaneous every 24 hours  pantoprazole  Injectable 40 milliGRAM(s) IV Push every 24 hours    MEDICATIONS  (PRN):  acetaminophen   IVPB .. 1000 milliGRAM(s) IV Intermittent every 6 hours PRN Mild Pain (1 - 3)  ondansetron Injectable 4 milliGRAM(s) IV Push every 8 hours PRN Nausea and/or Vomiting  oxyCODONE    IR 5 milliGRAM(s) Oral every 4 hours PRN Moderate Pain (4 - 6)      Vital Signs Last 24 Hrs  T(C): 37 (08 Jun 2024 00:05), Max: 37.4 (07 Jun 2024 22:13)  T(F): 98.6 (08 Jun 2024 00:05), Max: 99.3 (07 Jun 2024 22:13)  HR: 94 (08 Jun 2024 00:05) (72 - 94)  BP: 105/51 (08 Jun 2024 00:05) (92/53 - 114/55)  BP(mean): --  RR: 18 (08 Jun 2024 00:05) (17 - 18)  SpO2: 94% (08 Jun 2024 00:05) (94% - 97%)    Parameters below as of 08 Jun 2024 00:05  Patient On (Oxygen Delivery Method): room air          PHYSICAL EXAM   GENERAL: NAD, well developed  HEAD: Atraumatic, normocephalic  EYES: EOMI, PERRLA, conjunctiva and sclera clear  ENT: moist mucous membrane  NECK: supple, No JVD, midline trachea  CHEST/LUNG: No increased WOB, symmetric excursions  Heart: RRR ppp, no peripheral edema  ABDOMEN: Round, nondistended, soft, appropriately TTP, dressing c/d/i  EXTREMITIES: Brisk cap refill. no clubbing or cyanosis  NERVOUS SYSTEM: AOx4, speech clear, no neuro-deficits  MSK: full ROM, no deformities  SKIN: warm to touch, no rash or lesions      I&O's Detail      Daily     Daily     LABS:                        8.5    5.78  )-----------( 163      ( 08 Jun 2024 06:55 )             25.3     06-08    136  |  106  |  9   ----------------------------<  105<H>  4.1   |  26  |  0.75    Ca    8.2<L>      08 Jun 2024 06:55  Phos  2.7     06-08  Mg     2.4     06-08        Urinalysis Basic - ( 08 Jun 2024 06:55 )    Color: x / Appearance: x / SG: x / pH: x  Gluc: 105 mg/dL / Ketone: x  / Bili: x / Urobili: x   Blood: x / Protein: x / Nitrite: x   Leuk Esterase: x / RBC: x / WBC x   Sq Epi: x / Non Sq Epi: x / Bacteria: x

## 2024-06-09 NOTE — PROGRESS NOTE ADULT - ASSESSMENT
58 F s/p Lap ileostomy reversal on 6/6  Pt tolerating diet, had a hematoma in the left flank with decreased in HH, received 1 PRBC, hemodynamically stable  Binder and pressure dressing applied     #Rectal CA    Plan:   trend HH  Solid diet  Ambulation  DVT/GI ppx  Pain control  hospitalist consult appreciated  f/u daily labs     Will d/w CRS team and update accordingly
58 F s/p Lap ileostomy reversal on 6/6  #Rectal CA    Plan:   ERAS  FLD, ADAT  Ambulation  DVT/GI ppx  Pain control  hospitalist consult appreciated  D/c penrose on 6/8  f/u daily labs     Plan discussed with CRS team 
58 F s/p Lap ileostomy reversal on 6/6  #Rectal CA    Plan:   Likely d/c home today pending AM labs and exam  Solid diet  Ambulation  DVT/GI ppx  Pain control  hospitalist consult appreciated  D/c penrose on 6/8  f/u daily labs     Will d/w CRS team and update accordingly

## 2024-06-09 NOTE — PROGRESS NOTE ADULT - ATTENDING COMMENTS
Patient seen and examined at bedside this morning  She is doing well, tolerating a diet, having bowel function, denies N.V  Abdomen is soft, ND, AT, flank ecchymosis and port site hematoma stable. Transfused 1 PRBC yesterday. Hgb remains stable today  A/P: Continue with diet, follow up repeat H&H, continue abdominal binder. Plan for discharge home today once repeat Hgb stable                          9.6    6.74  )-----------( 169      ( 09 Jun 2024 13:16 )             27.7
Patient seen and examined at bedside this morning  She is doing well, tolerating a diet, having bowel function, denies N.V  Abdomen is soft, ND, AT, flank ecchymosis and port site hematoma  A/P: Continue with diet, repeat H&H, place abdominal binder. Hold dvt ppx if Hgb continues to trend downward. Potential discharge in 24-48hrs.    Vital Signs Last 24 Hrs  T(C): 37.1 (08 Jun 2024 09:00), Max: 37.4 (07 Jun 2024 22:13)  T(F): 98.7 (08 Jun 2024 09:00), Max: 99.3 (07 Jun 2024 22:13)  HR: 89 (08 Jun 2024 09:00) (84 - 94)  BP: 103/59 (08 Jun 2024 09:00) (92/53 - 105/51)  BP(mean): --  RR: 18 (08 Jun 2024 09:00) (18 - 18)  SpO2: 99% (08 Jun 2024 09:00) (94% - 99%)    Parameters below as of 08 Jun 2024 09:00  Patient On (Oxygen Delivery Method): room air                          7.6    6.07  )-----------( 136      ( 08 Jun 2024 11:53 )             22.4
S&E  Looks well. Hay low fiber diet, passing gas and stool.  AFVSS  NAD  soft, NTND  Labs reviewed  A/P -  Anticipate d/c home tomorrow.

## 2024-06-12 ENCOUNTER — EMERGENCY (EMERGENCY)
Facility: HOSPITAL | Age: 59
LOS: 0 days | Discharge: HOME | End: 2024-06-12
Attending: EMERGENCY MEDICINE
Payer: COMMERCIAL

## 2024-06-12 VITALS
HEART RATE: 83 BPM | SYSTOLIC BLOOD PRESSURE: 120 MMHG | TEMPERATURE: 99 F | OXYGEN SATURATION: 98 % | RESPIRATION RATE: 19 BRPM | DIASTOLIC BLOOD PRESSURE: 75 MMHG

## 2024-06-12 VITALS
OXYGEN SATURATION: 98 % | WEIGHT: 121.7 LBS | DIASTOLIC BLOOD PRESSURE: 86 MMHG | HEIGHT: 62 IN | TEMPERATURE: 98 F | SYSTOLIC BLOOD PRESSURE: 127 MMHG | RESPIRATION RATE: 18 BRPM | HEART RATE: 91 BPM

## 2024-06-12 DIAGNOSIS — Z95.828 PRESENCE OF OTHER VASCULAR IMPLANTS AND GRAFTS: Chronic | ICD-10-CM

## 2024-06-12 DIAGNOSIS — Z90.49 ACQUIRED ABSENCE OF OTHER SPECIFIED PARTS OF DIGESTIVE TRACT: Chronic | ICD-10-CM

## 2024-06-12 DIAGNOSIS — R11.2 NAUSEA WITH VOMITING, UNSPECIFIED: ICD-10-CM

## 2024-06-12 DIAGNOSIS — Z98.890 OTHER SPECIFIED POSTPROCEDURAL STATES: Chronic | ICD-10-CM

## 2024-06-12 DIAGNOSIS — Z85.038 PERSONAL HISTORY OF OTHER MALIGNANT NEOPLASM OF LARGE INTESTINE: ICD-10-CM

## 2024-06-12 DIAGNOSIS — K21.9 GASTRO-ESOPHAGEAL REFLUX DISEASE WITHOUT ESOPHAGITIS: ICD-10-CM

## 2024-06-12 DIAGNOSIS — L76.21 POSTPROCEDURAL HEMORRHAGE OF SKIN AND SUBCUTANEOUS TISSUE FOLLOWING A DERMATOLOGIC PROCEDURE: ICD-10-CM

## 2024-06-12 DIAGNOSIS — Z90.49 ACQUIRED ABSENCE OF OTHER SPECIFIED PARTS OF DIGESTIVE TRACT: ICD-10-CM

## 2024-06-12 DIAGNOSIS — G89.18 OTHER ACUTE POSTPROCEDURAL PAIN: ICD-10-CM

## 2024-06-12 DIAGNOSIS — R10.9 UNSPECIFIED ABDOMINAL PAIN: ICD-10-CM

## 2024-06-12 DIAGNOSIS — R19.7 DIARRHEA, UNSPECIFIED: ICD-10-CM

## 2024-06-12 PROBLEM — Z92.21 PERSONAL HISTORY OF ANTINEOPLASTIC CHEMOTHERAPY: Chronic | Status: ACTIVE | Noted: 2024-05-30

## 2024-06-12 PROBLEM — Z93.2 ILEOSTOMY STATUS: Chronic | Status: ACTIVE | Noted: 2024-05-30

## 2024-06-12 LAB
ALBUMIN SERPL ELPH-MCNC: 3.8 G/DL — SIGNIFICANT CHANGE UP (ref 3.3–5)
ALP SERPL-CCNC: 242 U/L — HIGH (ref 40–120)
ALT FLD-CCNC: 62 U/L — SIGNIFICANT CHANGE UP (ref 12–78)
ANION GAP SERPL CALC-SCNC: 6 MMOL/L — SIGNIFICANT CHANGE UP (ref 5–17)
APTT BLD: 30.9 SEC — SIGNIFICANT CHANGE UP (ref 24.5–35.6)
AST SERPL-CCNC: 64 U/L — HIGH (ref 15–37)
BASOPHILS # BLD AUTO: 0.01 K/UL — SIGNIFICANT CHANGE UP (ref 0–0.2)
BASOPHILS NFR BLD AUTO: 0.1 % — SIGNIFICANT CHANGE UP (ref 0–2)
BILIRUB SERPL-MCNC: 2.9 MG/DL — HIGH (ref 0.2–1.2)
BLD GP AB SCN SERPL QL: SIGNIFICANT CHANGE UP
BUN SERPL-MCNC: 20 MG/DL — SIGNIFICANT CHANGE UP (ref 7–23)
CALCIUM SERPL-MCNC: 9.3 MG/DL — SIGNIFICANT CHANGE UP (ref 8.5–10.1)
CHLORIDE SERPL-SCNC: 105 MMOL/L — SIGNIFICANT CHANGE UP (ref 96–108)
CO2 SERPL-SCNC: 27 MMOL/L — SIGNIFICANT CHANGE UP (ref 22–31)
CREAT SERPL-MCNC: 0.61 MG/DL — SIGNIFICANT CHANGE UP (ref 0.5–1.3)
EGFR: 104 ML/MIN/1.73M2 — SIGNIFICANT CHANGE UP
EOSINOPHIL # BLD AUTO: 0.01 K/UL — SIGNIFICANT CHANGE UP (ref 0–0.5)
EOSINOPHIL NFR BLD AUTO: 0.1 % — SIGNIFICANT CHANGE UP (ref 0–6)
GLUCOSE SERPL-MCNC: 141 MG/DL — HIGH (ref 70–99)
HCT VFR BLD CALC: 33.5 % — LOW (ref 34.5–45)
HGB BLD-MCNC: 11.5 G/DL — SIGNIFICANT CHANGE UP (ref 11.5–15.5)
IMM GRANULOCYTES NFR BLD AUTO: 0.3 % — SIGNIFICANT CHANGE UP (ref 0–0.9)
INR BLD: 0.99 RATIO — SIGNIFICANT CHANGE UP (ref 0.85–1.18)
LIDOCAIN IGE QN: 17 U/L — SIGNIFICANT CHANGE UP (ref 13–75)
LYMPHOCYTES # BLD AUTO: 0.36 K/UL — LOW (ref 1–3.3)
LYMPHOCYTES # BLD AUTO: 4.7 % — LOW (ref 13–44)
MANUAL SMEAR VERIFICATION: SIGNIFICANT CHANGE UP
MCHC RBC-ENTMCNC: 30.4 PG — SIGNIFICANT CHANGE UP (ref 27–34)
MCHC RBC-ENTMCNC: 34.3 GM/DL — SIGNIFICANT CHANGE UP (ref 32–36)
MCV RBC AUTO: 88.6 FL — SIGNIFICANT CHANGE UP (ref 80–100)
MONOCYTES # BLD AUTO: 0.54 K/UL — SIGNIFICANT CHANGE UP (ref 0–0.9)
MONOCYTES NFR BLD AUTO: 7 % — SIGNIFICANT CHANGE UP (ref 2–14)
NEUTROPHILS # BLD AUTO: 6.79 K/UL — SIGNIFICANT CHANGE UP (ref 1.8–7.4)
NEUTROPHILS NFR BLD AUTO: 87.8 % — HIGH (ref 43–77)
PLAT MORPH BLD: NORMAL — SIGNIFICANT CHANGE UP
PLATELET # BLD AUTO: 218 K/UL — SIGNIFICANT CHANGE UP (ref 150–400)
POTASSIUM SERPL-MCNC: 4.3 MMOL/L — SIGNIFICANT CHANGE UP (ref 3.5–5.3)
POTASSIUM SERPL-SCNC: 4.3 MMOL/L — SIGNIFICANT CHANGE UP (ref 3.5–5.3)
PROT SERPL-MCNC: 7.8 GM/DL — SIGNIFICANT CHANGE UP (ref 6–8.3)
PROTHROM AB SERPL-ACNC: 11.2 SEC — SIGNIFICANT CHANGE UP (ref 9.5–13)
RBC # BLD: 3.78 M/UL — LOW (ref 3.8–5.2)
RBC # FLD: 14.4 % — SIGNIFICANT CHANGE UP (ref 10.3–14.5)
RBC BLD AUTO: NORMAL — SIGNIFICANT CHANGE UP
SODIUM SERPL-SCNC: 138 MMOL/L — SIGNIFICANT CHANGE UP (ref 135–145)
WBC # BLD: 7.73 K/UL — SIGNIFICANT CHANGE UP (ref 3.8–10.5)
WBC # FLD AUTO: 7.73 K/UL — SIGNIFICANT CHANGE UP (ref 3.8–10.5)

## 2024-06-12 PROCEDURE — 85610 PROTHROMBIN TIME: CPT

## 2024-06-12 PROCEDURE — 74019 RADEX ABDOMEN 2 VIEWS: CPT | Mod: 26

## 2024-06-12 PROCEDURE — 80053 COMPREHEN METABOLIC PANEL: CPT

## 2024-06-12 PROCEDURE — 74019 RADEX ABDOMEN 2 VIEWS: CPT

## 2024-06-12 PROCEDURE — 99285 EMERGENCY DEPT VISIT HI MDM: CPT

## 2024-06-12 PROCEDURE — 36415 COLL VENOUS BLD VENIPUNCTURE: CPT

## 2024-06-12 PROCEDURE — 86850 RBC ANTIBODY SCREEN: CPT

## 2024-06-12 PROCEDURE — 74177 CT ABD & PELVIS W/CONTRAST: CPT | Mod: MC

## 2024-06-12 PROCEDURE — 96374 THER/PROPH/DIAG INJ IV PUSH: CPT | Mod: XU

## 2024-06-12 PROCEDURE — 83690 ASSAY OF LIPASE: CPT

## 2024-06-12 PROCEDURE — 86900 BLOOD TYPING SEROLOGIC ABO: CPT

## 2024-06-12 PROCEDURE — 86901 BLOOD TYPING SEROLOGIC RH(D): CPT

## 2024-06-12 PROCEDURE — 85025 COMPLETE CBC W/AUTO DIFF WBC: CPT

## 2024-06-12 PROCEDURE — 74177 CT ABD & PELVIS W/CONTRAST: CPT | Mod: 26,MC

## 2024-06-12 PROCEDURE — 85730 THROMBOPLASTIN TIME PARTIAL: CPT

## 2024-06-12 PROCEDURE — 99284 EMERGENCY DEPT VISIT MOD MDM: CPT | Mod: 25

## 2024-06-12 RX ORDER — ONDANSETRON 8 MG/1
1 TABLET, FILM COATED ORAL
Qty: 1 | Refills: 0
Start: 2024-06-12 | End: 2024-06-14

## 2024-06-12 RX ORDER — ONDANSETRON 8 MG/1
4 TABLET, FILM COATED ORAL ONCE
Refills: 0 | Status: COMPLETED | OUTPATIENT
Start: 2024-06-12 | End: 2024-06-12

## 2024-06-12 RX ORDER — SODIUM CHLORIDE 9 MG/ML
1000 INJECTION INTRAMUSCULAR; INTRAVENOUS; SUBCUTANEOUS ONCE
Refills: 0 | Status: COMPLETED | OUTPATIENT
Start: 2024-06-12 | End: 2024-06-12

## 2024-06-12 RX ADMIN — SODIUM CHLORIDE 1000 MILLILITER(S): 9 INJECTION INTRAMUSCULAR; INTRAVENOUS; SUBCUTANEOUS at 15:31

## 2024-06-12 RX ADMIN — ONDANSETRON 4 MILLIGRAM(S): 8 TABLET, FILM COATED ORAL at 12:25

## 2024-06-12 RX ADMIN — SODIUM CHLORIDE 1000 MILLILITER(S): 9 INJECTION INTRAMUSCULAR; INTRAVENOUS; SUBCUTANEOUS at 12:23

## 2024-06-12 NOTE — ED ADULT NURSE NOTE - NSFALLHARMRISKINTERV_ED_ALL_ED
Assistance OOB with selected safe patient handling equipment if applicable/Communicate risk of Fall with Harm to all staff, patient, and family/Provide visual cue: red socks, yellow wristband, yellow gown, etc/Reinforce activity limits and safety measures with patient and family/Bed in lowest position, wheels locked, appropriate side rails in place/Call bell, personal items and telephone in reach/Instruct patient to call for assistance before getting out of bed/chair/stretcher/Non-slip footwear applied when patient is off stretcher/[place] to call system/Physically safe environment - no spills, clutter or unnecessary equipment/Purposeful Proactive Rounding/Room/bathroom lighting operational, light cord in reach

## 2024-06-12 NOTE — ED STATDOCS - NS ED ATTENDING STATEMENT MOD
I have seen and examined this patient and fully participated in the care of this patient as the teaching attending.  The service was shared with the LUIS A.  I reviewed and verified the documentation.

## 2024-06-12 NOTE — ED STATDOCS - OBJECTIVE STATEMENT
59 yo female w/PMHx rectosigmoid CA s/p resection, GERD, prediabetes, ileostomy in place presents to the ED with multiple medical complaints. Pt states last night she was having persistent nausea with vomiting, developed abdominal pain as well. Pt has also been having consistent diarrhea and belching. Does not know if she has had a fever or not.   used, id# [id number]

## 2024-06-12 NOTE — ED ADULT TRIAGE NOTE - HEIGHT IN FEET
INSTRUCTIONS FOLLOWING GYN LAPAROSCOPY      ACTIVITY   Limit activity today; increase activity tomorrow, but no vigorous exercise   Shower only; no tub baths   No douches, tampons or intercourse until your doctor releases you (at least 2 weeks)   May return to work or school as directed by your doctor    DIET   Clear liquids until no nausea or vomiting   Advance to regular diet as tolerated    PAIN   Expect a moderate amount of pain.  Take pain medication as directed by your doctor. If no prescription for pain is sent home with you, take the appropriate dose of your commonly used pain medication.  Call you doctor if pain is NOT relieved by medication.  DO NOT take aspirin or blood thinners until directed by your doctor. DRESSING CARE *** Does Not Apply   Change dressing / band aids as directed by your doctor. FOLLOW PHONE [facility] will be made by nursing staff.  If you have any problems or concerns, call your doctor as needed. CALL YOUR DOCTOR IF   Excessive bleeding that does not stop after holding mild pressure over the area for 15 minutes   You soak a pad in an hour   Temperature of 101°F or above   Green or yellow, smelly drainage or discharge   You are unable to urinate by bedtime   Nausea and vomiting that does not stop by bedtime    AFTER ANESTHESIA   For the next 24 hours: DO NOT Drive, Drink alcoholic beverages, or Make important decisions.  Be aware of dizziness following anesthesia and while taking pain medication.    Plan to stay tonight within 1 hours drive of the hospital.
5

## 2024-06-12 NOTE — ED STATDOCS - PATIENT PORTAL LINK FT
You can access the FollowMyHealth Patient Portal offered by [facility] by registering at the following website: [email or web address]. By joining [facility]’s FollowMyHealth portal, you will also be able to view your health information using other applications (apps) compatible with our system.

## 2024-06-12 NOTE — ED STATDOCS - PROGRESS NOTE DETAILS
Using , 58-year-old female with a past medical history of colon cancer status post partial colon resection and ileostomy placement, recent ileostomy reversal with Dr. Mcdowell with an area, presents with nausea, vomiting, diarrhea yesterday.  Patient states that anything that she ate she would vomit and she has not had anything to eat or drink since yesterday.  Associated abdominal pain.  Surgical sites noted with surrounding ecchymosis.  Staples in place.  No surrounding erythema.  No active discharge.  Will check labs, CT, surgery consult and reevaluate. -Yair Coburn PA-C Spoke with surgery resident for Dr Bland.  States CT does not show signs of having an obstruction; however, since the contrast did not past the anastomosis site, they recommended doing a plain abdominal x-ray at approximately 5:30 PM to reevaluate.  Will reconsult once x-ray is performed. -Yair Coburn PA-C Using  [id number]. Discussed plan with patient and daughter at bedside.  Per surgery resident for Dr. Eric at the [facility] patient can be discharged home.  CT and x-ray unremarkable for an obstruction.  Recommends clear liquids for the next few days and slow progression to solids.  A regards dvised to call her surgeon tomorrow to make a follow-up appointment status post her ileostomy reversal.  Patient is aware and agrees with plan. -Yair Coburn PA-C

## 2024-06-12 NOTE — ED ADULT NURSE NOTE - OBJECTIVE STATEMENT
Hx Surgery to reverse colostomy 8 days ago.  c/o nausea, vomiting and diarrhea since surgery.  Abdomen ecchymotic with staples in place.

## 2024-06-12 NOTE — CONSULT NOTE ADULT - ASSESSMENT
58 F w hx of rectal cancer s/p low anterior resection with diverting loop ileostomy now s/p Lap ileostomy reversal on 6/6/24 complicated by some hematoma around port site and around left flank, s/p 1 u prbc, discharged home 3 days ago, coming to ED w N/V and diarhea for one day, mild abdominal pain, passing gas, no fever no chills.    Exam benign.  Labs w no leukocytosis lytes nl, h/H stable   CT A/P w contrast in proximal small bowel , no bowel dilation, no wall thickening, no c/f obstruction    s/p 2 L fluids in ED    Abdominal Xray hours after shows contrast in colon.    Plan:  No concern from colorectal surgery standpoint  Can be discharged home   Pt should be followed up in [facility] clinic as scheduled before  Advised the pt and the daughter go very slow w diet

## 2024-06-12 NOTE — ED ADULT TRIAGE NOTE - CHIEF COMPLAINT QUOTE
Pt presents to the ED c/o difficulty sleeping last night due to nausea, diarrhea, and gas. No medication taken for symptoms. PMH of colon surgery.

## 2024-06-12 NOTE — ED STATDOCS - NSFOLLOWUPINSTRUCTIONS_ED_ALL_ED_FT
Follow up with your surgeon for further evaluation of your post surgical state. Start with clean liquids for the first few days and slow progression to solids.   Return to the ER for any new or other concerns.       Anel un seguimiento con becerra cirujano para liss evaluación adicional de becerra estado posquirúrgico. Comience con líquidos limpios obie los primeros días y avance lentamente hacia los sólidos.   Regrese a la leo de emergencias si tiene alguna inquietud nueva o de otro tipo.

## 2024-06-12 NOTE — ED STATDOCS - PHYSICAL EXAMINATION
Constitutional: NAD AOx3  Eyes: PERRL EOMI  Head: Normocephalic atraumatic  Mouth: MMM  Cardiac: regular rate and rhythm  Resp: Lungs CTAB  GI: Abd diffusely tender to palpation, but mostly the lower quadrants, surgical sites clean, dry, and intact with staples, lower abdomen ecchymotic with dark purple and yellow bruising  Neuro: CN2-12 grossly intact, GRIFFIN x 4  Skin: No visible rashes

## 2024-06-12 NOTE — CONSULT NOTE ADULT - SUBJECTIVE AND OBJECTIVE BOX
HPI:  58 F w hx of rectal cancer s/p low anterior resection with diverting loop ileostomy now s/p Lap ileostomy reversal on 6/6/24 complicated by some hematoma around port site and around left flank, s/p 1 u prbc, discharged home 3 days ago, coming to ED w N/V and diarhea for one day, mild abdominal pain, passing gas, no fever no chills.    STATUS POST:      POST OPERATIVE DAY #:     Vital Signs Last 24 Hrs  T(C): 36.8 (12 Jun 2024 11:14), Max: 36.8 (12 Jun 2024 11:14)  T(F): 98.2 (12 Jun 2024 11:14), Max: 98.2 (12 Jun 2024 11:14)  HR: 91 (12 Jun 2024 11:14) (91 - 91)  BP: 127/86 (12 Jun 2024 11:14) (127/86 - 127/86)  BP(mean): 100 (12 Jun 2024 11:14) (100 - 100)  RR: 18 (12 Jun 2024 11:14) (18 - 18)  SpO2: 98% (12 Jun 2024 11:14) (98% - 98%)        General Appearance: Appears well, NAD  Neck: Supple  Chest: Equal expansion bilaterally, equal breath sounds  CV: Pulse regular presently  Abdomen: Soft, nontense, non distended mildly tender around reversal site, ecchymoses on midline and left flank and pelvic area, TTP,incisions c/d/i w staples in place   Extremities: Grossly symmetric,          MEDICATIONS  (STANDING):    MEDICATIONS  (PRN):      LABS:                        11.5   7.73  )-----------( 218      ( 12 Jun 2024 12:16 )             33.5     06-12    138  |  105  |  20  ----------------------------<  141<H>  4.3   |  27  |  0.61    Ca    9.3      12 Jun 2024 13:21    TPro  7.8  /  Alb  3.8  /  TBili  2.9<H>  /  DBili  x   /  AST  64<H>  /  ALT  62  /  AlkPhos  242<H>  06-12    PT/INR - ( 12 Jun 2024 13:21 )   PT: 11.2 sec;   INR: 0.99 ratio         PTT - ( 12 Jun 2024 13:21 )  PTT:30.9 sec  Urinalysis Basic - ( 12 Jun 2024 13:21 )    Color: x / Appearance: x / SG: x / pH: x  Gluc: 141 mg/dL / Ketone: x  / Bili: x / Urobili: x   Blood: x / Protein: x / Nitrite: x   Leuk Esterase: x / RBC: x / WBC x   Sq Epi: x / Non Sq Epi: x / Bacteria: x        RADIOLOGY & ADDITIONAL STUDIES:  < from: CT Abdomen and Pelvis w/ Oral Cont and w/ IV Cont (06.12.24 @ 14:06) >  Opacification of proximal small bowel loops without definite dilatation   or wall thickening. Recommend delayed imaging either with plain   radiographs or follow-up CT to assess for more distal progression of the   oral contrast    < end of copied text >

## 2024-06-13 LAB — SURGICAL PATHOLOGY STUDY: SIGNIFICANT CHANGE UP

## 2024-06-14 DIAGNOSIS — Z43.2 ENCOUNTER FOR ATTENTION TO ILEOSTOMY: ICD-10-CM

## 2024-06-14 DIAGNOSIS — E78.5 HYPERLIPIDEMIA, UNSPECIFIED: ICD-10-CM

## 2024-06-14 DIAGNOSIS — I10 ESSENTIAL (PRIMARY) HYPERTENSION: ICD-10-CM

## 2024-06-14 DIAGNOSIS — D62 ACUTE POSTHEMORRHAGIC ANEMIA: ICD-10-CM

## 2024-06-14 DIAGNOSIS — R73.03 PREDIABETES: ICD-10-CM

## 2024-06-14 DIAGNOSIS — K21.9 GASTRO-ESOPHAGEAL REFLUX DISEASE WITHOUT ESOPHAGITIS: ICD-10-CM

## 2024-06-14 DIAGNOSIS — Z85.048 PERSONAL HISTORY OF OTHER MALIGNANT NEOPLASM OF RECTUM, RECTOSIGMOID JUNCTION, AND ANUS: ICD-10-CM

## 2024-06-21 ENCOUNTER — APPOINTMENT (OUTPATIENT)
Dept: COLORECTAL SURGERY | Facility: CLINIC | Age: 59
End: 2024-06-21
Payer: COMMERCIAL

## 2024-06-21 VITALS
HEIGHT: 61 IN | BODY MASS INDEX: 23.03 KG/M2 | WEIGHT: 122 LBS | DIASTOLIC BLOOD PRESSURE: 61 MMHG | OXYGEN SATURATION: 100 % | TEMPERATURE: 98.1 F | HEART RATE: 97 BPM | RESPIRATION RATE: 15 BRPM | SYSTOLIC BLOOD PRESSURE: 105 MMHG

## 2024-06-21 PROCEDURE — 99024 POSTOP FOLLOW-UP VISIT: CPT

## 2024-06-21 NOTE — HISTORY OF PRESENT ILLNESS
[FreeTextEntry1] : 58yoF s/p ileostomy closure by Dr. De León 6/6/2024.  Having some constipation last few days but continues to pass flatus.  No significant pain.  Some oozing via old ileostomy site.

## 2024-06-21 NOTE — PHYSICAL EXAM
[JVD] : no jugular venous distention  [Respiratory Effort] : normal respiratory effort [Normal Rate and Rhythm] : normal rate and rhythm [No Edema] : No edema [Alert] : alert [Oriented to Person] : oriented to person [Oriented to Place] : oriented to place [Oriented to Time] : oriented to time [Calm] : calm [de-identified] : Soft, nondistended.  Palpable hematoma deep to ileostomy site.  Resolving ecchymoses around all port sites and ileostomy site.  Small amount serosanguonous drainage via stoma site. [de-identified] : NAD [de-identified] : Normcephalic [de-identified] : NAD [de-identified] : as above; laparoscopy sites all well-healed, staples removed.  Lateral and medial staples removed from ileostomy site; small amount of skin separation at central aspect.  Steri-strips placed and three staples left in place.

## 2024-06-21 NOTE — PLAN
[TextEntry] : -- Advised regarding wound care at stoma site -- Follow up next week in office for removal of three remaining staples, would plan to place steri-strips over remainder of wound at that point -- Advised regarding stool softeners given recent constipation: Miralax or Colace, once daily to start - can adjust as needed

## 2024-06-25 ENCOUNTER — NON-APPOINTMENT (OUTPATIENT)
Age: 59
End: 2024-06-25

## 2024-06-25 ENCOUNTER — APPOINTMENT (OUTPATIENT)
Dept: OBGYN | Facility: CLINIC | Age: 59
End: 2024-06-25

## 2024-06-28 ENCOUNTER — APPOINTMENT (OUTPATIENT)
Dept: COLORECTAL SURGERY | Facility: CLINIC | Age: 59
End: 2024-06-28

## 2024-06-28 VITALS
HEIGHT: 61 IN | SYSTOLIC BLOOD PRESSURE: 117 MMHG | BODY MASS INDEX: 23.03 KG/M2 | RESPIRATION RATE: 16 BRPM | WEIGHT: 122 LBS | DIASTOLIC BLOOD PRESSURE: 71 MMHG | TEMPERATURE: 98 F | HEART RATE: 73 BPM

## 2024-06-28 DIAGNOSIS — Z09 ENCOUNTER FOR FOLLOW-UP EXAMINATION AFTER COMPLETED TREATMENT FOR CONDITIONS OTHER THAN MALIGNANT NEOPLASM: ICD-10-CM

## 2024-06-28 PROCEDURE — 99024 POSTOP FOLLOW-UP VISIT: CPT

## 2024-07-05 ENCOUNTER — APPOINTMENT (OUTPATIENT)
Dept: COLORECTAL SURGERY | Facility: CLINIC | Age: 59
End: 2024-07-05

## 2024-07-26 ENCOUNTER — OUTPATIENT (OUTPATIENT)
Dept: OUTPATIENT SERVICES | Facility: HOSPITAL | Age: 59
LOS: 1 days | End: 2024-07-26
Payer: COMMERCIAL

## 2024-07-26 DIAGNOSIS — Z95.828 PRESENCE OF OTHER VASCULAR IMPLANTS AND GRAFTS: Chronic | ICD-10-CM

## 2024-07-26 DIAGNOSIS — Z98.890 OTHER SPECIFIED POSTPROCEDURAL STATES: Chronic | ICD-10-CM

## 2024-07-26 DIAGNOSIS — Z51.11 ENCOUNTER FOR ANTINEOPLASTIC CHEMOTHERAPY: ICD-10-CM

## 2024-07-26 DIAGNOSIS — C20 MALIGNANT NEOPLASM OF RECTUM: ICD-10-CM

## 2024-07-26 DIAGNOSIS — R11.2 NAUSEA WITH VOMITING, UNSPECIFIED: ICD-10-CM

## 2024-07-26 DIAGNOSIS — Z90.49 ACQUIRED ABSENCE OF OTHER SPECIFIED PARTS OF DIGESTIVE TRACT: Chronic | ICD-10-CM

## 2024-07-30 ENCOUNTER — APPOINTMENT (OUTPATIENT)
Dept: COLORECTAL SURGERY | Facility: CLINIC | Age: 59
End: 2024-07-30
Payer: COMMERCIAL

## 2024-07-30 VITALS
HEART RATE: 60 BPM | TEMPERATURE: 98.1 F | OXYGEN SATURATION: 98 % | WEIGHT: 121 LBS | SYSTOLIC BLOOD PRESSURE: 135 MMHG | BODY MASS INDEX: 23.75 KG/M2 | DIASTOLIC BLOOD PRESSURE: 78 MMHG | RESPIRATION RATE: 14 BRPM | HEIGHT: 60 IN

## 2024-07-30 DIAGNOSIS — C20 MALIGNANT NEOPLASM OF RECTUM: ICD-10-CM

## 2024-07-30 DIAGNOSIS — Z93.2 ILEOSTOMY STATUS: ICD-10-CM

## 2024-07-30 PROCEDURE — 99024 POSTOP FOLLOW-UP VISIT: CPT

## 2024-07-31 NOTE — PHYSICAL EXAM
[Abdomen Masses] : No abdominal masses [Abdomen Tenderness] : ~T No ~M abdominal tenderness [Tender] : nontender [Alert] : alert [Oriented to Person] : oriented to person [Oriented to Place] : oriented to place [Oriented to Time] : oriented to time [Calm] : calm [de-identified] : well healed incision

## 2024-07-31 NOTE — ASSESSMENT
[FreeTextEntry1] : 58 year old female post op ileostomy reversal . doing well. foloow up with oncology, high fiber diet, metamucil daily

## 2024-08-05 ENCOUNTER — RESULT REVIEW (OUTPATIENT)
Age: 59
End: 2024-08-05

## 2024-08-05 ENCOUNTER — APPOINTMENT (OUTPATIENT)
Dept: HEMATOLOGY ONCOLOGY | Facility: CLINIC | Age: 59
End: 2024-08-05

## 2024-08-05 ENCOUNTER — APPOINTMENT (OUTPATIENT)
Dept: INFUSION THERAPY | Facility: CANCER CENTER | Age: 59
End: 2024-08-05

## 2024-08-05 LAB
BASOPHILS # BLD AUTO: 0.03 K/UL — SIGNIFICANT CHANGE UP (ref 0–0.2)
BASOPHILS NFR BLD AUTO: 0.6 % — SIGNIFICANT CHANGE UP (ref 0–2)
EOSINOPHIL # BLD AUTO: 0.07 K/UL — SIGNIFICANT CHANGE UP (ref 0–0.5)
EOSINOPHIL NFR BLD AUTO: 1.5 % — SIGNIFICANT CHANGE UP (ref 0–6)
HCT VFR BLD CALC: 38.7 % — SIGNIFICANT CHANGE UP (ref 34.5–45)
HGB BLD-MCNC: 12.9 G/DL — SIGNIFICANT CHANGE UP (ref 11.5–15.5)
IMM GRANULOCYTES NFR BLD AUTO: 0.2 % — SIGNIFICANT CHANGE UP (ref 0–0.9)
LYMPHOCYTES # BLD AUTO: 1.75 K/UL — SIGNIFICANT CHANGE UP (ref 1–3.3)
LYMPHOCYTES # BLD AUTO: 37.6 % — SIGNIFICANT CHANGE UP (ref 13–44)
MCHC RBC-ENTMCNC: 29.9 PG — SIGNIFICANT CHANGE UP (ref 27–34)
MCHC RBC-ENTMCNC: 33.3 GM/DL — SIGNIFICANT CHANGE UP (ref 32–36)
MCV RBC AUTO: 89.6 FL — SIGNIFICANT CHANGE UP (ref 80–100)
MONOCYTES # BLD AUTO: 0.33 K/UL — SIGNIFICANT CHANGE UP (ref 0–0.9)
MONOCYTES NFR BLD AUTO: 7.1 % — SIGNIFICANT CHANGE UP (ref 2–14)
NEUTROPHILS # BLD AUTO: 2.46 K/UL — SIGNIFICANT CHANGE UP (ref 1.8–7.4)
NEUTROPHILS NFR BLD AUTO: 53 % — SIGNIFICANT CHANGE UP (ref 43–77)
NRBC # BLD: 0 /100 WBCS — SIGNIFICANT CHANGE UP (ref 0–0)
PLATELET # BLD AUTO: 185 K/UL — SIGNIFICANT CHANGE UP (ref 150–400)
RBC # BLD: 4.32 M/UL — SIGNIFICANT CHANGE UP (ref 3.8–5.2)
RBC # FLD: 12.6 % — SIGNIFICANT CHANGE UP (ref 10.3–14.5)
WBC # BLD: 4.65 K/UL — SIGNIFICANT CHANGE UP (ref 3.8–10.5)
WBC # FLD AUTO: 4.65 K/UL — SIGNIFICANT CHANGE UP (ref 3.8–10.5)

## 2024-08-05 PROCEDURE — 85027 COMPLETE CBC AUTOMATED: CPT

## 2024-08-05 PROCEDURE — 82378 CARCINOEMBRYONIC ANTIGEN: CPT

## 2024-08-05 PROCEDURE — 80053 COMPREHEN METABOLIC PANEL: CPT

## 2024-08-05 PROCEDURE — 99215 OFFICE O/P EST HI 40 MIN: CPT

## 2024-08-05 PROCEDURE — G2211 COMPLEX E/M VISIT ADD ON: CPT | Mod: NC

## 2024-08-06 LAB
ALBUMIN SERPL ELPH-MCNC: 4.4 G/DL — SIGNIFICANT CHANGE UP (ref 3.3–5)
ALP SERPL-CCNC: 150 U/L — HIGH (ref 40–120)
ALT FLD-CCNC: 19 U/L — SIGNIFICANT CHANGE UP (ref 10–45)
ANION GAP SERPL CALC-SCNC: 13 MMOL/L — SIGNIFICANT CHANGE UP (ref 5–17)
AST SERPL-CCNC: 34 U/L — SIGNIFICANT CHANGE UP (ref 10–40)
BILIRUB SERPL-MCNC: 0.4 MG/DL — SIGNIFICANT CHANGE UP (ref 0.2–1.2)
BUN SERPL-MCNC: 10 MG/DL — SIGNIFICANT CHANGE UP (ref 7–23)
CALCIUM SERPL-MCNC: 9.5 MG/DL — SIGNIFICANT CHANGE UP (ref 8.4–10.5)
CEA SERPL-MCNC: 2.7 NG/ML — SIGNIFICANT CHANGE UP (ref 0–3.8)
CHLORIDE SERPL-SCNC: 104 MMOL/L — SIGNIFICANT CHANGE UP (ref 96–108)
CO2 SERPL-SCNC: 23 MMOL/L — SIGNIFICANT CHANGE UP (ref 22–31)
CREAT SERPL-MCNC: 0.56 MG/DL — SIGNIFICANT CHANGE UP (ref 0.5–1.3)
EGFR: 106 ML/MIN/1.73M2 — SIGNIFICANT CHANGE UP
GLUCOSE SERPL-MCNC: 86 MG/DL — SIGNIFICANT CHANGE UP (ref 70–99)
POTASSIUM SERPL-MCNC: 4.1 MMOL/L — SIGNIFICANT CHANGE UP (ref 3.5–5.3)
POTASSIUM SERPL-SCNC: 4.1 MMOL/L — SIGNIFICANT CHANGE UP (ref 3.5–5.3)
PROT SERPL-MCNC: 7.1 G/DL — SIGNIFICANT CHANGE UP (ref 6–8.3)
SODIUM SERPL-SCNC: 140 MMOL/L — SIGNIFICANT CHANGE UP (ref 135–145)

## 2024-08-06 NOTE — HISTORY OF PRESENT ILLNESS
[de-identified] : Referred by: Dr. Alphonso De León  PCP: Dr. Crump Initially presented w/ her daughter Crystal Jackson (daughter) -  [phone]  Cancer Summary:  DIAGNOSIS: Rectosigmoid cancer  PROCEDURE AND DATE: large bowel resection/rectosigmoid excision w/ Dr. De León on 10/12/23 PATHOLOGY:  infiltrating mod diff adenocarcinoma, measuring 3cm which infiltrates the muscularis propria. 8/26 LN+ STAGE: pT2N2b, prognostic stage IIIB POSTOPERATIVE TREATMENT: Chemotherapy:  C1 FOLFOX on 11/7/23 C2 FOLFOX 11/29/23 C3 FOLFOX 12/12/23 C4 FOLFOX 12/26/23 C5 FOLFOX 1/9/24 C6 FOLFOX 1/30/24 C7 FOLFOX 2/13/24 C8 FOLFOX 2/27/24 C9 FOLFOX 3/19/24 C10 FOLFOX 4/2/24 C11 5FU 4/16/24 C12 5FU 4/30/24 Radiation: n/a STATUS: FLORIN    Ms. Valdes presented at age 57 in October 2023 for evaluation of rectosigmoid cancer.  The patient has a medical history of pre-diabetes.  Surgical history: recent large bowel excision with colostomy placement.  Jess was undergoing evaluation for uterine prolapse- recommended her for screening colonoscopy during that workup which revealed a mass in the rectosigmoid colon. Colonoscopy was performed on 9/11/23 by Dr. De León- biopsy was performed which revealed severely ulcerated superficial fragments of adenocarcinoma, moderately differentiated, in a background of tubular adenoma with high-grade dysplasia, microsatellite stable.  Imaging with pelvic MRI on September 27, 2023 revealed a high rectal cancer, stage T1-T2, no concerning lymph nodes were seen at that time.  CT chest abdomen pelvis revealed no evidence of metastatic disease.  She was seen initially by Dr. Jatin Pedro at [facility]. She ultimately underwent large bowel resection/rectosigmoid excision w/ Dr. De León on 10/12/23 which revealed infiltrating mod diff adenocarcinoma, measuring 3cm which infiltrates the muscularis propria.  8 out of 26 lymph nodes were positive for metastatic carcinoma. She was referred for discussion of adjuvant therapy.  She initially presented with her daughter.  They are Zimbabwean-speaking.  She denied recent headaches, visual changes, balance issues, CP, cough, SOB, n/v/d, constipation, unintentional weight loss or new bone or back pain.  She was healing well from surgery.     Imaging:  MR pelvis 9/27/23- high rectal cancer, stage T1/T2, N0 CT C/A/P 9/27/23- no evidence of metastatic disease in the CAP  Path: Sigmoid colon mass biopsy 9/11/23 Large bowel rectosigmoid excision 10/12/23- Infiltrating moderately differentiated adenocarcinoma measuring 3.0 cm, infiltrates into the muscularis propria -8/26 lymph nodes are positive for metastatic carcinoma.  LVI neg.  2.  Large bowel, proximal anastomosis, excision: Negative for malignancy 3. Large bowel, distal anastomosis, excision: Negative for malignancy  pT2N2b, prognostic stage IIIB  Genetics: not done    HCM: - COVID vaccination:  Moderna x2, no boosters - Flu shots - 10/12/23 - Colonoscopy:  9/11/23 -> rectosigmoid mass - Gyn:  2023 - no hx abnormal Paps - Mammo:  2023 - wnl - Lung cancer screen:  n/a - DEXA:  none prior   SH: - Occupation:  not currently working  - Living situation:  Lives in [place] with her son and family - Smoking/etoh/illicits:  Never smoker, rare etoh, no illicits  - Exercise: walking   FH: - no known family history of cancer    [de-identified] : Presents for follow up on 8/5/24 for rectosigmoid cancer.  Accompanied by her daughter. Received D1C1 FOLFOX on 11/7/23; completed 12 cycles of FOLFOX on 4/30/24.  CT A/P - 5/15/24 - no intra-abdominal metastasis, 2 cm left ovarian dermoid cyst (stable)  Assisted by  Sai #[id number]  At today's visit she reports feeling very well.  Now s/p ileostomy reversal 6/7/24 w/ Dr. De León and recovering well from surgery.  Appetite and weight stable; no F/Cs, n/v/d, constipation or pain.  She has general questions about next steps and reducing risk for recurrence (foods to avoid, etc.).  Will maintain port for now.  Will continue to follow up with gyn periodically for ovarian cyst.  Laboratory studies reviewed at today's visit and notable for: WBC 4.65, Hb 12.9, plt 185

## 2024-08-06 NOTE — RESULTS/DATA
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  Imaging was negative for metastatic disease. She is now s/p large bowel rectosigmoid excision 10/12/23 w/ Dr. Gutierrez which revealed Infiltrating moderately differentiated adenocarcinoma measuring 3.0 cm, infiltrates into the muscularis propria, 8/26 lymph nodes are positive for metastatic carcinoma.  LVI neg.  pT2N2b, prognostic stage IIIB Given multiple positive lymph nodes and stage III disease would recommend adjuvant chemotherapy with FOLFOX x6 months. In patients w/ high risk stage III colorectal cancer, 3 months of FOLFOX was inferior to 6 months of FOLFOX.   Received D1C1 FOLFOX 11/7/23 w/ good tolerance.  Completed 12 cycles of FOLFOX on 4/30/24 which she overall tolerated well.  Following w/ Dr. De León- CT A/P - 5/15/24 - no intra-abdominal metastasis, 2 cm left ovarian dermoid cyst (stable) Now s/p ileostomy reversal 6/7/24 and healing very well We discussed r/b/a to [employer] MRD testing (ctDNA) - pt interested in proceeding - will send initial blood sample today, consider repeat q 3-6 months.

## 2024-08-06 NOTE — PHYSICAL EXAM
[Fully active, able to carry on all pre-disease performance without restriction] : Status 0 - Fully active, able to carry on all pre-disease performance without restriction [Normal] : affect appropriate [de-identified] : well appearing female, NAD, pleasant  [de-identified] : s/p ileostomy reversal, healing well, no erythema or discharge at incision site

## 2024-08-06 NOTE — PHYSICAL EXAM
[Fully active, able to carry on all pre-disease performance without restriction] : Status 0 - Fully active, able to carry on all pre-disease performance without restriction [Normal] : affect appropriate [de-identified] : well appearing female, NAD, pleasant  [de-identified] : s/p ileostomy reversal, healing well, no erythema or discharge at incision site

## 2024-08-06 NOTE — REASON FOR VISIT
[Follow-Up Visit] : a follow-up [[employer] ] : provided by [employer]   [FreeTextEntry2] : Colon cancer  [TWNoteComboBox1] : [place]

## 2024-08-06 NOTE — REASON FOR VISIT
[Follow-Up Visit] : a follow-up [[employer] ] : provided by [employer]   [FreeTextEntry2] : Colon cancer  [TWNoteComboBox1] : Finnish

## 2024-08-06 NOTE — HISTORY OF PRESENT ILLNESS
[de-identified] : Referred by: Dr. Alphonso De León  PCP: Dr. Crump Initially presented w/ her daughter Crystal Jackson (daughter) -  [phone]  Cancer Summary:  DIAGNOSIS: Rectosigmoid cancer  PROCEDURE AND DATE: large bowel resection/rectosigmoid excision w/ Dr. De León on 10/12/23 PATHOLOGY:  infiltrating mod diff adenocarcinoma, measuring 3cm which infiltrates the muscularis propria. 8/26 LN+ STAGE: pT2N2b, prognostic stage IIIB POSTOPERATIVE TREATMENT: Chemotherapy:  C1 FOLFOX on 11/7/23 C2 FOLFOX 11/29/23 C3 FOLFOX 12/12/23 C4 FOLFOX 12/26/23 C5 FOLFOX 1/9/24 C6 FOLFOX 1/30/24 C7 FOLFOX 2/13/24 C8 FOLFOX 2/27/24 C9 FOLFOX 3/19/24 C10 FOLFOX 4/2/24 C11 5FU 4/16/24 C12 5FU 4/30/24 Radiation: n/a STATUS: FLORIN    Ms. Valdes presented at age 57 in October 2023 for evaluation of rectosigmoid cancer.  The patient has a medical history of pre-diabetes.  Surgical history: recent large bowel excision with colostomy placement.  Jess was undergoing evaluation for uterine prolapse- recommended her for screening colonoscopy during that workup which revealed a mass in the rectosigmoid colon. Colonoscopy was performed on 9/11/23 by Dr. De León- biopsy was performed which revealed severely ulcerated superficial fragments of adenocarcinoma, moderately differentiated, in a background of tubular adenoma with high-grade dysplasia, microsatellite stable.  Imaging with pelvic MRI on September 27, 2023 revealed a high rectal cancer, stage T1-T2, no concerning lymph nodes were seen at that time.  CT chest abdomen pelvis revealed no evidence of metastatic disease.  She was seen initially by Dr. Jatin Pedro at [facility]. She ultimately underwent large bowel resection/rectosigmoid excision w/ Dr. De León on 10/12/23 which revealed infiltrating mod diff adenocarcinoma, measuring 3cm which infiltrates the muscularis propria.  8 out of 26 lymph nodes were positive for metastatic carcinoma. She was referred for discussion of adjuvant therapy.  She initially presented with her daughter.  They are [place]ian-speaking.  She denied recent headaches, visual changes, balance issues, CP, cough, SOB, n/v/d, constipation, unintentional weight loss or new bone or back pain.  She was healing well from surgery.     Imaging:  MR pelvis 9/27/23- high rectal cancer, stage T1/T2, N0 CT C/A/P 9/27/23- no evidence of metastatic disease in the CAP  Path: Sigmoid colon mass biopsy 9/11/23 Large bowel rectosigmoid excision 10/12/23- Infiltrating moderately differentiated adenocarcinoma measuring 3.0 cm, infiltrates into the muscularis propria -8/26 lymph nodes are positive for metastatic carcinoma.  LVI neg.  2.  Large bowel, proximal anastomosis, excision: Negative for malignancy 3. Large bowel, distal anastomosis, excision: Negative for malignancy  pT2N2b, prognostic stage IIIB  Genetics: not done    HCM: - COVID vaccination:  Moderna x2, no boosters - Flu shots - 10/12/23 - Colonoscopy:  9/11/23 -> rectosigmoid mass - Gyn:  2023 - no hx abnormal Paps - Mammo:  2023 - wnl - Lung cancer screen:  n/a - DEXA:  none prior   SH: - Occupation:  not currently working  - Living situation:  Lives in [place] with her son and family - Smoking/etoh/illicits:  Never smoker, rare etoh, no illicits  - Exercise: walking   FH: - no known family history of cancer    [de-identified] : Presents for follow up on 8/5/24 for rectosigmoid cancer.  Accompanied by her daughter. Received D1C1 FOLFOX on 11/7/23; completed 12 cycles of FOLFOX on 4/30/24.  CT A/P - 5/15/24 - no intra-abdominal metastasis, 2 cm left ovarian dermoid cyst (stable)  Assisted by  Sai #[id number]  At today's visit she reports feeling very well.  Now s/p ileostomy reversal 6/7/24 w/ Dr. De León and recovering well from surgery.  Appetite and weight stable; no F/Cs, n/v/d, constipation or pain.  She has general questions about next steps and reducing risk for recurrence (foods to avoid, etc.).  Will maintain port for now.  Will continue to follow up with gyn periodically for ovarian cyst.  Laboratory studies reviewed at today's visit and notable for: WBC 4.65, Hb 12.9, plt 185

## 2024-09-03 ENCOUNTER — TRANSCRIPTION ENCOUNTER (OUTPATIENT)
Age: 59
End: 2024-09-03

## 2024-09-04 ENCOUNTER — APPOINTMENT (OUTPATIENT)
Dept: COLORECTAL SURGERY | Facility: GI CENTER | Age: 59
End: 2024-09-04

## 2024-09-04 ENCOUNTER — OUTPATIENT (OUTPATIENT)
Dept: OUTPATIENT SERVICES | Facility: HOSPITAL | Age: 59
LOS: 1 days | End: 2024-09-04
Payer: COMMERCIAL

## 2024-09-04 DIAGNOSIS — Z90.49 ACQUIRED ABSENCE OF OTHER SPECIFIED PARTS OF DIGESTIVE TRACT: Chronic | ICD-10-CM

## 2024-09-04 DIAGNOSIS — C20 MALIGNANT NEOPLASM OF RECTUM: ICD-10-CM

## 2024-09-04 DIAGNOSIS — Z98.890 OTHER SPECIFIED POSTPROCEDURAL STATES: Chronic | ICD-10-CM

## 2024-09-04 PROCEDURE — G0105: CPT

## 2024-09-04 PROCEDURE — 45378 DIAGNOSTIC COLONOSCOPY: CPT | Mod: 58

## 2024-09-19 ENCOUNTER — OUTPATIENT (OUTPATIENT)
Dept: OUTPATIENT SERVICES | Facility: HOSPITAL | Age: 59
LOS: 1 days | Discharge: HOME | End: 2024-09-19

## 2024-09-19 DIAGNOSIS — Z98.890 OTHER SPECIFIED POSTPROCEDURAL STATES: Chronic | ICD-10-CM

## 2024-09-19 DIAGNOSIS — Z90.49 ACQUIRED ABSENCE OF OTHER SPECIFIED PARTS OF DIGESTIVE TRACT: Chronic | ICD-10-CM

## 2024-09-19 DIAGNOSIS — Z95.828 PRESENCE OF OTHER VASCULAR IMPLANTS AND GRAFTS: Chronic | ICD-10-CM

## 2024-09-19 DIAGNOSIS — C20 MALIGNANT NEOPLASM OF RECTUM: ICD-10-CM

## 2024-09-27 ENCOUNTER — OUTPATIENT (OUTPATIENT)
Dept: OUTPATIENT SERVICES | Facility: HOSPITAL | Age: 59
LOS: 1 days | End: 2024-09-27
Payer: COMMERCIAL

## 2024-09-27 DIAGNOSIS — C20 MALIGNANT NEOPLASM OF RECTUM: ICD-10-CM

## 2024-09-27 DIAGNOSIS — Z51.11 ENCOUNTER FOR ANTINEOPLASTIC CHEMOTHERAPY: ICD-10-CM

## 2024-09-27 DIAGNOSIS — Z95.828 PRESENCE OF OTHER VASCULAR IMPLANTS AND GRAFTS: Chronic | ICD-10-CM

## 2024-09-27 DIAGNOSIS — Z90.49 ACQUIRED ABSENCE OF OTHER SPECIFIED PARTS OF DIGESTIVE TRACT: Chronic | ICD-10-CM

## 2024-09-27 DIAGNOSIS — R11.2 NAUSEA WITH VOMITING, UNSPECIFIED: ICD-10-CM

## 2024-09-27 DIAGNOSIS — Z98.890 OTHER SPECIFIED POSTPROCEDURAL STATES: Chronic | ICD-10-CM

## 2024-10-07 ENCOUNTER — APPOINTMENT (OUTPATIENT)
Dept: HEMATOLOGY ONCOLOGY | Facility: CLINIC | Age: 59
End: 2024-10-07
Payer: COMMERCIAL

## 2024-10-07 ENCOUNTER — RESULT REVIEW (OUTPATIENT)
Age: 59
End: 2024-10-07

## 2024-10-07 ENCOUNTER — NON-APPOINTMENT (OUTPATIENT)
Age: 59
End: 2024-10-07

## 2024-10-07 ENCOUNTER — APPOINTMENT (OUTPATIENT)
Dept: INFUSION THERAPY | Facility: CANCER CENTER | Age: 59
End: 2024-10-07

## 2024-10-07 VITALS
TEMPERATURE: 98.6 F | SYSTOLIC BLOOD PRESSURE: 135 MMHG | HEIGHT: 60 IN | OXYGEN SATURATION: 97 % | BODY MASS INDEX: 22.62 KG/M2 | HEART RATE: 66 BPM | DIASTOLIC BLOOD PRESSURE: 82 MMHG | WEIGHT: 115.2 LBS

## 2024-10-07 DIAGNOSIS — C19 MALIGNANT NEOPLASM OF RECTOSIGMOID JUNCTION: ICD-10-CM

## 2024-10-07 LAB
BASOPHILS # BLD AUTO: 0.03 K/UL — SIGNIFICANT CHANGE UP (ref 0–0.2)
BASOPHILS NFR BLD AUTO: 0.6 % — SIGNIFICANT CHANGE UP (ref 0–2)
EOSINOPHIL # BLD AUTO: 0.06 K/UL — SIGNIFICANT CHANGE UP (ref 0–0.5)
EOSINOPHIL NFR BLD AUTO: 1.2 % — SIGNIFICANT CHANGE UP (ref 0–6)
HCT VFR BLD CALC: 39.8 % — SIGNIFICANT CHANGE UP (ref 34.5–45)
HGB BLD-MCNC: 13.5 G/DL — SIGNIFICANT CHANGE UP (ref 11.5–15.5)
IMM GRANULOCYTES NFR BLD AUTO: 0.2 % — SIGNIFICANT CHANGE UP (ref 0–0.9)
LYMPHOCYTES # BLD AUTO: 1.47 K/UL — SIGNIFICANT CHANGE UP (ref 1–3.3)
LYMPHOCYTES # BLD AUTO: 28.8 % — SIGNIFICANT CHANGE UP (ref 13–44)
MCHC RBC-ENTMCNC: 29.8 PG — SIGNIFICANT CHANGE UP (ref 27–34)
MCHC RBC-ENTMCNC: 33.9 GM/DL — SIGNIFICANT CHANGE UP (ref 32–36)
MCV RBC AUTO: 87.9 FL — SIGNIFICANT CHANGE UP (ref 80–100)
MONOCYTES # BLD AUTO: 0.39 K/UL — SIGNIFICANT CHANGE UP (ref 0–0.9)
MONOCYTES NFR BLD AUTO: 7.6 % — SIGNIFICANT CHANGE UP (ref 2–14)
NEUTROPHILS # BLD AUTO: 3.15 K/UL — SIGNIFICANT CHANGE UP (ref 1.8–7.4)
NEUTROPHILS NFR BLD AUTO: 61.6 % — SIGNIFICANT CHANGE UP (ref 43–77)
NRBC # BLD: 0 /100 WBCS — SIGNIFICANT CHANGE UP (ref 0–0)
PLATELET # BLD AUTO: 211 K/UL — SIGNIFICANT CHANGE UP (ref 150–400)
RBC # BLD: 4.53 M/UL — SIGNIFICANT CHANGE UP (ref 3.8–5.2)
RBC # FLD: 13.6 % — SIGNIFICANT CHANGE UP (ref 10.3–14.5)
WBC # BLD: 5.11 K/UL — SIGNIFICANT CHANGE UP (ref 3.8–10.5)
WBC # FLD AUTO: 5.11 K/UL — SIGNIFICANT CHANGE UP (ref 3.8–10.5)

## 2024-10-07 PROCEDURE — 99214 OFFICE O/P EST MOD 30 MIN: CPT

## 2024-10-07 PROCEDURE — G2211 COMPLEX E/M VISIT ADD ON: CPT | Mod: NC

## 2024-10-08 LAB
ALBUMIN SERPL ELPH-MCNC: 4.1 G/DL — SIGNIFICANT CHANGE UP (ref 3.3–5)
ALP SERPL-CCNC: 140 U/L — HIGH (ref 40–120)
ALT FLD-CCNC: 32 U/L — SIGNIFICANT CHANGE UP (ref 10–45)
ANION GAP SERPL CALC-SCNC: 14 MMOL/L — SIGNIFICANT CHANGE UP (ref 5–17)
AST SERPL-CCNC: 42 U/L — HIGH (ref 10–40)
BILIRUB SERPL-MCNC: 0.4 MG/DL — SIGNIFICANT CHANGE UP (ref 0.2–1.2)
BUN SERPL-MCNC: 17 MG/DL — SIGNIFICANT CHANGE UP (ref 7–23)
CALCIUM SERPL-MCNC: 9.7 MG/DL — SIGNIFICANT CHANGE UP (ref 8.4–10.5)
CEA SERPL-MCNC: 2.2 NG/ML — SIGNIFICANT CHANGE UP (ref 0–3.8)
CHLORIDE SERPL-SCNC: 104 MMOL/L — SIGNIFICANT CHANGE UP (ref 96–108)
CO2 SERPL-SCNC: 21 MMOL/L — LOW (ref 22–31)
CREAT SERPL-MCNC: 0.55 MG/DL — SIGNIFICANT CHANGE UP (ref 0.5–1.3)
EGFR: 107 ML/MIN/1.73M2 — SIGNIFICANT CHANGE UP
GLUCOSE SERPL-MCNC: 111 MG/DL — HIGH (ref 70–99)
POTASSIUM SERPL-MCNC: 4.3 MMOL/L — SIGNIFICANT CHANGE UP (ref 3.5–5.3)
POTASSIUM SERPL-SCNC: 4.3 MMOL/L — SIGNIFICANT CHANGE UP (ref 3.5–5.3)
PROT SERPL-MCNC: 7.2 G/DL — SIGNIFICANT CHANGE UP (ref 6–8.3)
SODIUM SERPL-SCNC: 139 MMOL/L — SIGNIFICANT CHANGE UP (ref 135–145)

## 2024-11-21 ENCOUNTER — OUTPATIENT (OUTPATIENT)
Dept: OUTPATIENT SERVICES | Facility: HOSPITAL | Age: 59
LOS: 1 days | Discharge: HOME | End: 2024-11-21

## 2024-11-21 DIAGNOSIS — Z98.890 OTHER SPECIFIED POSTPROCEDURAL STATES: Chronic | ICD-10-CM

## 2024-11-21 DIAGNOSIS — Z95.828 PRESENCE OF OTHER VASCULAR IMPLANTS AND GRAFTS: Chronic | ICD-10-CM

## 2024-11-21 DIAGNOSIS — C20 MALIGNANT NEOPLASM OF RECTUM: ICD-10-CM

## 2024-11-21 DIAGNOSIS — Z90.49 ACQUIRED ABSENCE OF OTHER SPECIFIED PARTS OF DIGESTIVE TRACT: Chronic | ICD-10-CM

## 2024-11-25 ENCOUNTER — RESULT REVIEW (OUTPATIENT)
Age: 59
End: 2024-11-25

## 2024-11-25 ENCOUNTER — APPOINTMENT (OUTPATIENT)
Dept: INFUSION THERAPY | Facility: CANCER CENTER | Age: 59
End: 2024-11-25

## 2024-11-25 ENCOUNTER — APPOINTMENT (OUTPATIENT)
Dept: HEMATOLOGY ONCOLOGY | Facility: CLINIC | Age: 59
End: 2024-11-25
Payer: COMMERCIAL

## 2024-11-25 VITALS
BODY MASS INDEX: 22.78 KG/M2 | WEIGHT: 116 LBS | SYSTOLIC BLOOD PRESSURE: 122 MMHG | HEART RATE: 84 BPM | TEMPERATURE: 98.6 F | OXYGEN SATURATION: 97 % | HEIGHT: 60 IN | DIASTOLIC BLOOD PRESSURE: 78 MMHG

## 2024-11-25 DIAGNOSIS — C19 MALIGNANT NEOPLASM OF RECTOSIGMOID JUNCTION: ICD-10-CM

## 2024-11-25 LAB
BASOPHILS # BLD AUTO: 0.02 K/UL — SIGNIFICANT CHANGE UP (ref 0–0.2)
BASOPHILS NFR BLD AUTO: 0.3 % — SIGNIFICANT CHANGE UP (ref 0–2)
EOSINOPHIL # BLD AUTO: 0.07 K/UL — SIGNIFICANT CHANGE UP (ref 0–0.5)
EOSINOPHIL NFR BLD AUTO: 1.1 % — SIGNIFICANT CHANGE UP (ref 0–6)
HCT VFR BLD CALC: 39.1 % — SIGNIFICANT CHANGE UP (ref 34.5–45)
HGB BLD-MCNC: 13.4 G/DL — SIGNIFICANT CHANGE UP (ref 11.5–15.5)
IMM GRANULOCYTES NFR BLD AUTO: 0.5 % — SIGNIFICANT CHANGE UP (ref 0–0.9)
LYMPHOCYTES # BLD AUTO: 1.44 K/UL — SIGNIFICANT CHANGE UP (ref 1–3.3)
LYMPHOCYTES # BLD AUTO: 23 % — SIGNIFICANT CHANGE UP (ref 13–44)
MCHC RBC-ENTMCNC: 30.7 PG — SIGNIFICANT CHANGE UP (ref 27–34)
MCHC RBC-ENTMCNC: 34.3 G/DL — SIGNIFICANT CHANGE UP (ref 32–36)
MCV RBC AUTO: 89.5 FL — SIGNIFICANT CHANGE UP (ref 80–100)
MONOCYTES # BLD AUTO: 0.66 K/UL — SIGNIFICANT CHANGE UP (ref 0–0.9)
MONOCYTES NFR BLD AUTO: 10.5 % — SIGNIFICANT CHANGE UP (ref 2–14)
NEUTROPHILS # BLD AUTO: 4.05 K/UL — SIGNIFICANT CHANGE UP (ref 1.8–7.4)
NEUTROPHILS NFR BLD AUTO: 64.6 % — SIGNIFICANT CHANGE UP (ref 43–77)
NRBC # BLD: 0 /100 WBCS — SIGNIFICANT CHANGE UP (ref 0–0)
PLATELET # BLD AUTO: 201 K/UL — SIGNIFICANT CHANGE UP (ref 150–400)
RBC # BLD: 4.37 M/UL — SIGNIFICANT CHANGE UP (ref 3.8–5.2)
RBC # FLD: 12.8 % — SIGNIFICANT CHANGE UP (ref 10.3–14.5)
WBC # BLD: 6.27 K/UL — SIGNIFICANT CHANGE UP (ref 3.8–10.5)
WBC # FLD AUTO: 6.27 K/UL — SIGNIFICANT CHANGE UP (ref 3.8–10.5)

## 2024-11-25 PROCEDURE — 82378 CARCINOEMBRYONIC ANTIGEN: CPT

## 2024-11-25 PROCEDURE — G2211 COMPLEX E/M VISIT ADD ON: CPT | Mod: NC

## 2024-11-25 PROCEDURE — 80053 COMPREHEN METABOLIC PANEL: CPT

## 2024-11-25 PROCEDURE — 96523 IRRIG DRUG DELIVERY DEVICE: CPT

## 2024-11-25 PROCEDURE — 85027 COMPLETE CBC AUTOMATED: CPT

## 2024-11-25 PROCEDURE — 99214 OFFICE O/P EST MOD 30 MIN: CPT

## 2024-11-26 LAB
ALBUMIN SERPL ELPH-MCNC: 4.3 G/DL — SIGNIFICANT CHANGE UP (ref 3.3–5)
ALP SERPL-CCNC: 128 U/L — HIGH (ref 40–120)
ALT FLD-CCNC: 27 U/L — SIGNIFICANT CHANGE UP (ref 10–45)
ANION GAP SERPL CALC-SCNC: 12 MMOL/L — SIGNIFICANT CHANGE UP (ref 5–17)
AST SERPL-CCNC: 33 U/L — SIGNIFICANT CHANGE UP (ref 10–40)
BILIRUB SERPL-MCNC: 0.6 MG/DL — SIGNIFICANT CHANGE UP (ref 0.2–1.2)
BUN SERPL-MCNC: 17 MG/DL — SIGNIFICANT CHANGE UP (ref 7–23)
CALCIUM SERPL-MCNC: 9.4 MG/DL — SIGNIFICANT CHANGE UP (ref 8.4–10.5)
CEA SERPL-MCNC: 1.9 NG/ML — SIGNIFICANT CHANGE UP (ref 0–3.8)
CHLORIDE SERPL-SCNC: 104 MMOL/L — SIGNIFICANT CHANGE UP (ref 96–108)
CO2 SERPL-SCNC: 21 MMOL/L — LOW (ref 22–31)
CREAT SERPL-MCNC: 0.71 MG/DL — SIGNIFICANT CHANGE UP (ref 0.5–1.3)
EGFR: 98 ML/MIN/1.73M2 — SIGNIFICANT CHANGE UP
GLUCOSE SERPL-MCNC: 91 MG/DL — SIGNIFICANT CHANGE UP (ref 70–99)
POTASSIUM SERPL-MCNC: 4.2 MMOL/L — SIGNIFICANT CHANGE UP (ref 3.5–5.3)
POTASSIUM SERPL-SCNC: 4.2 MMOL/L — SIGNIFICANT CHANGE UP (ref 3.5–5.3)
PROT SERPL-MCNC: 7.1 G/DL — SIGNIFICANT CHANGE UP (ref 6–8.3)
SODIUM SERPL-SCNC: 138 MMOL/L — SIGNIFICANT CHANGE UP (ref 135–145)

## 2024-12-11 ENCOUNTER — RESULT REVIEW (OUTPATIENT)
Age: 59
End: 2024-12-11

## 2024-12-11 ENCOUNTER — APPOINTMENT (OUTPATIENT)
Dept: CT IMAGING | Facility: CLINIC | Age: 59
End: 2024-12-11
Payer: COMMERCIAL

## 2024-12-11 PROCEDURE — 71260 CT THORAX DX C+: CPT

## 2024-12-11 PROCEDURE — 74178 CT ABD&PLV WO CNTR FLWD CNTR: CPT

## 2025-01-09 ENCOUNTER — OUTPATIENT (OUTPATIENT)
Dept: OUTPATIENT SERVICES | Facility: HOSPITAL | Age: 60
LOS: 1 days | End: 2025-01-09
Payer: COMMERCIAL

## 2025-01-09 DIAGNOSIS — Z95.828 PRESENCE OF OTHER VASCULAR IMPLANTS AND GRAFTS: Chronic | ICD-10-CM

## 2025-01-09 DIAGNOSIS — C20 MALIGNANT NEOPLASM OF RECTUM: ICD-10-CM

## 2025-01-09 DIAGNOSIS — Z51.11 ENCOUNTER FOR ANTINEOPLASTIC CHEMOTHERAPY: ICD-10-CM

## 2025-01-09 DIAGNOSIS — Z90.49 ACQUIRED ABSENCE OF OTHER SPECIFIED PARTS OF DIGESTIVE TRACT: Chronic | ICD-10-CM

## 2025-01-09 DIAGNOSIS — Z98.890 OTHER SPECIFIED POSTPROCEDURAL STATES: Chronic | ICD-10-CM

## 2025-01-09 DIAGNOSIS — R11.2 NAUSEA WITH VOMITING, UNSPECIFIED: ICD-10-CM

## 2025-01-10 ENCOUNTER — RESULT REVIEW (OUTPATIENT)
Age: 60
End: 2025-01-10

## 2025-01-10 ENCOUNTER — APPOINTMENT (OUTPATIENT)
Dept: INFUSION THERAPY | Facility: CANCER CENTER | Age: 60
End: 2025-01-10

## 2025-01-10 LAB
BASOPHILS # BLD AUTO: 0.02 K/UL — SIGNIFICANT CHANGE UP (ref 0–0.2)
BASOPHILS NFR BLD AUTO: 0.3 % — SIGNIFICANT CHANGE UP (ref 0–2)
EOSINOPHIL # BLD AUTO: 0.06 K/UL — SIGNIFICANT CHANGE UP (ref 0–0.5)
EOSINOPHIL NFR BLD AUTO: 1 % — SIGNIFICANT CHANGE UP (ref 0–6)
HCT VFR BLD CALC: 41.1 % — SIGNIFICANT CHANGE UP (ref 34.5–45)
HGB BLD-MCNC: 14.1 G/DL — SIGNIFICANT CHANGE UP (ref 11.5–15.5)
IMM GRANULOCYTES NFR BLD AUTO: 0.2 % — SIGNIFICANT CHANGE UP (ref 0–0.9)
LYMPHOCYTES # BLD AUTO: 2.06 K/UL — SIGNIFICANT CHANGE UP (ref 1–3.3)
LYMPHOCYTES # BLD AUTO: 33.1 % — SIGNIFICANT CHANGE UP (ref 13–44)
MCHC RBC-ENTMCNC: 30.6 PG — SIGNIFICANT CHANGE UP (ref 27–34)
MCHC RBC-ENTMCNC: 34.3 G/DL — SIGNIFICANT CHANGE UP (ref 32–36)
MCV RBC AUTO: 89.2 FL — SIGNIFICANT CHANGE UP (ref 80–100)
MONOCYTES # BLD AUTO: 0.4 K/UL — SIGNIFICANT CHANGE UP (ref 0–0.9)
MONOCYTES NFR BLD AUTO: 6.4 % — SIGNIFICANT CHANGE UP (ref 2–14)
NEUTROPHILS # BLD AUTO: 3.68 K/UL — SIGNIFICANT CHANGE UP (ref 1.8–7.4)
NEUTROPHILS NFR BLD AUTO: 59 % — SIGNIFICANT CHANGE UP (ref 43–77)
NRBC # BLD: 0 /100 WBCS — SIGNIFICANT CHANGE UP (ref 0–0)
NRBC BLD-RTO: 0 /100 WBCS — SIGNIFICANT CHANGE UP (ref 0–0)
PLATELET # BLD AUTO: 227 K/UL — SIGNIFICANT CHANGE UP (ref 150–400)
RBC # BLD: 4.61 M/UL — SIGNIFICANT CHANGE UP (ref 3.8–5.2)
RBC # FLD: 11.7 % — SIGNIFICANT CHANGE UP (ref 10.3–14.5)
WBC # BLD: 6.23 K/UL — SIGNIFICANT CHANGE UP (ref 3.8–10.5)
WBC # FLD AUTO: 6.23 K/UL — SIGNIFICANT CHANGE UP (ref 3.8–10.5)

## 2025-01-11 LAB
ALBUMIN SERPL ELPH-MCNC: 4.4 G/DL — SIGNIFICANT CHANGE UP (ref 3.3–5)
ALP SERPL-CCNC: 130 U/L — HIGH (ref 40–120)
ALT FLD-CCNC: 19 U/L — SIGNIFICANT CHANGE UP (ref 10–45)
ANION GAP SERPL CALC-SCNC: 12 MMOL/L — SIGNIFICANT CHANGE UP (ref 5–17)
AST SERPL-CCNC: 25 U/L — SIGNIFICANT CHANGE UP (ref 10–40)
BILIRUB SERPL-MCNC: 0.3 MG/DL — SIGNIFICANT CHANGE UP (ref 0.2–1.2)
BUN SERPL-MCNC: 16 MG/DL — SIGNIFICANT CHANGE UP (ref 7–23)
CALCIUM SERPL-MCNC: 9.8 MG/DL — SIGNIFICANT CHANGE UP (ref 8.4–10.5)
CEA SERPL-MCNC: 1.6 NG/ML — SIGNIFICANT CHANGE UP (ref 0–3.8)
CHLORIDE SERPL-SCNC: 103 MMOL/L — SIGNIFICANT CHANGE UP (ref 96–108)
CO2 SERPL-SCNC: 24 MMOL/L — SIGNIFICANT CHANGE UP (ref 22–31)
CREAT SERPL-MCNC: 0.59 MG/DL — SIGNIFICANT CHANGE UP (ref 0.5–1.3)
EGFR: 104 ML/MIN/1.73M2 — SIGNIFICANT CHANGE UP
GLUCOSE SERPL-MCNC: 133 MG/DL — HIGH (ref 70–99)
POTASSIUM SERPL-MCNC: 3.8 MMOL/L — SIGNIFICANT CHANGE UP (ref 3.5–5.3)
POTASSIUM SERPL-SCNC: 3.8 MMOL/L — SIGNIFICANT CHANGE UP (ref 3.5–5.3)
PROT SERPL-MCNC: 6.9 G/DL — SIGNIFICANT CHANGE UP (ref 6–8.3)
SODIUM SERPL-SCNC: 139 MMOL/L — SIGNIFICANT CHANGE UP (ref 135–145)

## 2025-02-09 NOTE — H&P PST ADULT - NSICDXPASTMEDICALHX_GEN_ALL_CORE_FT
-PAST MEDICAL HISTORY:  Bilateral cataracts     GERD (gastroesophageal reflux disease)     Prediabetes     Rash of face     Rectosigmoid cancer     Uterine prolapse      PAST MEDICAL HISTORY:  Bilateral cataracts     GERD (gastroesophageal reflux disease)     Prediabetes     Rash of face     Rectal bleeding     Rectosigmoid cancer     Uterine prolapse

## 2025-02-10 ENCOUNTER — APPOINTMENT (OUTPATIENT)
Dept: INFUSION THERAPY | Facility: CANCER CENTER | Age: 60
End: 2025-02-10

## 2025-02-10 ENCOUNTER — RESULT REVIEW (OUTPATIENT)
Age: 60
End: 2025-02-10

## 2025-02-10 ENCOUNTER — APPOINTMENT (OUTPATIENT)
Dept: HEMATOLOGY ONCOLOGY | Facility: CLINIC | Age: 60
End: 2025-02-10
Payer: COMMERCIAL

## 2025-02-10 VITALS
HEIGHT: 60 IN | SYSTOLIC BLOOD PRESSURE: 116 MMHG | BODY MASS INDEX: 23.36 KG/M2 | WEIGHT: 119 LBS | HEART RATE: 70 BPM | DIASTOLIC BLOOD PRESSURE: 74 MMHG | TEMPERATURE: 97.8 F | OXYGEN SATURATION: 99 %

## 2025-02-10 DIAGNOSIS — C19 MALIGNANT NEOPLASM OF RECTOSIGMOID JUNCTION: ICD-10-CM

## 2025-02-10 LAB
BASOPHILS # BLD AUTO: 0.02 K/UL — SIGNIFICANT CHANGE UP (ref 0–0.2)
BASOPHILS NFR BLD AUTO: 0.3 % — SIGNIFICANT CHANGE UP (ref 0–2)
EOSINOPHIL # BLD AUTO: 0.06 K/UL — SIGNIFICANT CHANGE UP (ref 0–0.5)
EOSINOPHIL NFR BLD AUTO: 0.9 % — SIGNIFICANT CHANGE UP (ref 0–6)
HCT VFR BLD CALC: 38.9 % — SIGNIFICANT CHANGE UP (ref 34.5–45)
HGB BLD-MCNC: 13 G/DL — SIGNIFICANT CHANGE UP (ref 11.5–15.5)
IMM GRANULOCYTES NFR BLD AUTO: 0.1 % — SIGNIFICANT CHANGE UP (ref 0–0.9)
LYMPHOCYTES # BLD AUTO: 1.63 K/UL — SIGNIFICANT CHANGE UP (ref 1–3.3)
LYMPHOCYTES # BLD AUTO: 23.8 % — SIGNIFICANT CHANGE UP (ref 13–44)
MCHC RBC-ENTMCNC: 30 PG — SIGNIFICANT CHANGE UP (ref 27–34)
MCHC RBC-ENTMCNC: 33.4 G/DL — SIGNIFICANT CHANGE UP (ref 32–36)
MCV RBC AUTO: 89.6 FL — SIGNIFICANT CHANGE UP (ref 80–100)
MONOCYTES # BLD AUTO: 0.49 K/UL — SIGNIFICANT CHANGE UP (ref 0–0.9)
MONOCYTES NFR BLD AUTO: 7.1 % — SIGNIFICANT CHANGE UP (ref 2–14)
NEUTROPHILS # BLD AUTO: 4.65 K/UL — SIGNIFICANT CHANGE UP (ref 1.8–7.4)
NEUTROPHILS NFR BLD AUTO: 67.8 % — SIGNIFICANT CHANGE UP (ref 43–77)
NRBC # BLD: 0 /100 WBCS — SIGNIFICANT CHANGE UP (ref 0–0)
NRBC BLD-RTO: 0 /100 WBCS — SIGNIFICANT CHANGE UP (ref 0–0)
PLATELET # BLD AUTO: 184 K/UL — SIGNIFICANT CHANGE UP (ref 150–400)
RBC # BLD: 4.34 M/UL — SIGNIFICANT CHANGE UP (ref 3.8–5.2)
RBC # FLD: 12 % — SIGNIFICANT CHANGE UP (ref 10.3–14.5)
WBC # BLD: 6.86 K/UL — SIGNIFICANT CHANGE UP (ref 3.8–10.5)
WBC # FLD AUTO: 6.86 K/UL — SIGNIFICANT CHANGE UP (ref 3.8–10.5)

## 2025-02-10 PROCEDURE — 82378 CARCINOEMBRYONIC ANTIGEN: CPT

## 2025-02-10 PROCEDURE — 85027 COMPLETE CBC AUTOMATED: CPT

## 2025-02-10 PROCEDURE — 96523 IRRIG DRUG DELIVERY DEVICE: CPT

## 2025-02-10 PROCEDURE — 99214 OFFICE O/P EST MOD 30 MIN: CPT

## 2025-02-10 PROCEDURE — 36415 COLL VENOUS BLD VENIPUNCTURE: CPT

## 2025-02-10 PROCEDURE — 80053 COMPREHEN METABOLIC PANEL: CPT

## 2025-02-11 LAB
ALBUMIN SERPL ELPH-MCNC: 4.2 G/DL — SIGNIFICANT CHANGE UP (ref 3.3–5)
ALP SERPL-CCNC: 125 U/L — HIGH (ref 40–120)
ALT FLD-CCNC: 29 U/L — SIGNIFICANT CHANGE UP (ref 10–45)
ANION GAP SERPL CALC-SCNC: 9 MMOL/L — SIGNIFICANT CHANGE UP (ref 5–17)
AST SERPL-CCNC: 37 U/L — SIGNIFICANT CHANGE UP (ref 10–40)
BILIRUB SERPL-MCNC: 0.3 MG/DL — SIGNIFICANT CHANGE UP (ref 0.2–1.2)
BUN SERPL-MCNC: 21 MG/DL — SIGNIFICANT CHANGE UP (ref 7–23)
CALCIUM SERPL-MCNC: 9.5 MG/DL — SIGNIFICANT CHANGE UP (ref 8.4–10.5)
CEA SERPL-MCNC: 1.4 NG/ML — SIGNIFICANT CHANGE UP (ref 0–3.8)
CHLORIDE SERPL-SCNC: 105 MMOL/L — SIGNIFICANT CHANGE UP (ref 96–108)
CO2 SERPL-SCNC: 25 MMOL/L — SIGNIFICANT CHANGE UP (ref 22–31)
CREAT SERPL-MCNC: 0.56 MG/DL — SIGNIFICANT CHANGE UP (ref 0.5–1.3)
EGFR: 106 ML/MIN/1.73M2 — SIGNIFICANT CHANGE UP
GLUCOSE SERPL-MCNC: 114 MG/DL — HIGH (ref 70–99)
INR PPP: 0.96 RATIO
POTASSIUM SERPL-MCNC: 4.2 MMOL/L — SIGNIFICANT CHANGE UP (ref 3.5–5.3)
POTASSIUM SERPL-SCNC: 4.2 MMOL/L — SIGNIFICANT CHANGE UP (ref 3.5–5.3)
PROT SERPL-MCNC: 6.9 G/DL — SIGNIFICANT CHANGE UP (ref 6–8.3)
PT BLD: 11.3 SEC
SODIUM SERPL-SCNC: 139 MMOL/L — SIGNIFICANT CHANGE UP (ref 135–145)

## 2025-02-28 ENCOUNTER — RESULT REVIEW (OUTPATIENT)
Age: 60
End: 2025-02-28

## 2025-05-14 DIAGNOSIS — R79.89 OTHER SPECIFIED ABNORMAL FINDINGS OF BLOOD CHEMISTRY: ICD-10-CM

## 2025-05-19 ENCOUNTER — OUTPATIENT (OUTPATIENT)
Dept: OUTPATIENT SERVICES | Facility: HOSPITAL | Age: 60
LOS: 1 days | End: 2025-05-19
Payer: COMMERCIAL

## 2025-05-19 DIAGNOSIS — Z98.890 OTHER SPECIFIED POSTPROCEDURAL STATES: Chronic | ICD-10-CM

## 2025-05-19 DIAGNOSIS — Z51.11 ENCOUNTER FOR ANTINEOPLASTIC CHEMOTHERAPY: ICD-10-CM

## 2025-05-19 DIAGNOSIS — R11.2 NAUSEA WITH VOMITING, UNSPECIFIED: ICD-10-CM

## 2025-05-19 DIAGNOSIS — C20 MALIGNANT NEOPLASM OF RECTUM: ICD-10-CM

## 2025-05-19 DIAGNOSIS — Z95.828 PRESENCE OF OTHER VASCULAR IMPLANTS AND GRAFTS: Chronic | ICD-10-CM

## 2025-05-19 DIAGNOSIS — Z90.49 ACQUIRED ABSENCE OF OTHER SPECIFIED PARTS OF DIGESTIVE TRACT: Chronic | ICD-10-CM

## 2025-05-22 ENCOUNTER — RESULT REVIEW (OUTPATIENT)
Age: 60
End: 2025-05-22

## 2025-05-22 ENCOUNTER — APPOINTMENT (OUTPATIENT)
Dept: HEMATOLOGY ONCOLOGY | Facility: CLINIC | Age: 60
End: 2025-05-22
Payer: COMMERCIAL

## 2025-05-22 VITALS
HEART RATE: 64 BPM | TEMPERATURE: 98.4 F | SYSTOLIC BLOOD PRESSURE: 168 MMHG | HEIGHT: 60 IN | BODY MASS INDEX: 23.44 KG/M2 | WEIGHT: 119.4 LBS | DIASTOLIC BLOOD PRESSURE: 85 MMHG | OXYGEN SATURATION: 100 %

## 2025-05-22 DIAGNOSIS — C20 MALIGNANT NEOPLASM OF RECTUM: ICD-10-CM

## 2025-05-22 DIAGNOSIS — C19 MALIGNANT NEOPLASM OF RECTOSIGMOID JUNCTION: ICD-10-CM

## 2025-05-22 PROCEDURE — 99213 OFFICE O/P EST LOW 20 MIN: CPT

## 2025-05-22 PROCEDURE — G2211 COMPLEX E/M VISIT ADD ON: CPT | Mod: NC

## 2025-05-22 PROCEDURE — 85027 COMPLETE CBC AUTOMATED: CPT

## 2025-05-23 LAB
ALBUMIN SERPL ELPH-MCNC: 4.7 G/DL
ALP BLD-CCNC: 139 U/L
ALT SERPL-CCNC: 35 U/L
ANION GAP SERPL CALC-SCNC: 12 MMOL/L
AST SERPL-CCNC: 34 U/L
BILIRUB SERPL-MCNC: 0.3 MG/DL
BUN SERPL-MCNC: 17 MG/DL
CALCIUM SERPL-MCNC: 9.6 MG/DL
CEA SERPL-MCNC: 1.8 NG/ML
CHLORIDE SERPL-SCNC: 103 MMOL/L
CO2 SERPL-SCNC: 24 MMOL/L
CREAT SERPL-MCNC: 0.65 MG/DL
EGFRCR SERPLBLD CKD-EPI 2021: 102 ML/MIN/1.73M2
GLUCOSE SERPL-MCNC: 108 MG/DL
POTASSIUM SERPL-SCNC: 4.4 MMOL/L
PROT SERPL-MCNC: 7.6 G/DL
SODIUM SERPL-SCNC: 139 MMOL/L

## 2025-06-04 ENCOUNTER — RESULT REVIEW (OUTPATIENT)
Age: 60
End: 2025-06-04

## 2025-06-05 ENCOUNTER — RESULT REVIEW (OUTPATIENT)
Age: 60
End: 2025-06-05

## 2025-06-05 ENCOUNTER — APPOINTMENT (OUTPATIENT)
Dept: CT IMAGING | Facility: CLINIC | Age: 60
End: 2025-06-05
Payer: COMMERCIAL

## 2025-06-05 PROCEDURE — 71260 CT THORAX DX C+: CPT

## 2025-06-05 PROCEDURE — 74177 CT ABD & PELVIS W/CONTRAST: CPT

## 2025-09-03 ENCOUNTER — RESULT REVIEW (OUTPATIENT)
Age: 60
End: 2025-09-03

## 2025-09-03 ENCOUNTER — APPOINTMENT (OUTPATIENT)
Dept: HEMATOLOGY ONCOLOGY | Facility: CLINIC | Age: 60
End: 2025-09-03
Payer: COMMERCIAL

## 2025-09-03 VITALS
SYSTOLIC BLOOD PRESSURE: 152 MMHG | DIASTOLIC BLOOD PRESSURE: 85 MMHG | BODY MASS INDEX: 22.42 KG/M2 | TEMPERATURE: 98.2 F | OXYGEN SATURATION: 100 % | WEIGHT: 114.2 LBS | HEART RATE: 65 BPM | HEIGHT: 60 IN

## 2025-09-03 DIAGNOSIS — C19 MALIGNANT NEOPLASM OF RECTOSIGMOID JUNCTION: ICD-10-CM

## 2025-09-03 PROCEDURE — 99214 OFFICE O/P EST MOD 30 MIN: CPT

## 2025-09-04 LAB
ALBUMIN SERPL ELPH-MCNC: 4.4 G/DL
ALP BLD-CCNC: 104 U/L
ALT SERPL-CCNC: 22 U/L
ANION GAP SERPL CALC-SCNC: 14 MMOL/L
AST SERPL-CCNC: 30 U/L
BILIRUB SERPL-MCNC: 0.4 MG/DL
BUN SERPL-MCNC: 9 MG/DL
CALCIUM SERPL-MCNC: 9.5 MG/DL
CEA SERPL-MCNC: 1.9 NG/ML
CHLORIDE SERPL-SCNC: 102 MMOL/L
CO2 SERPL-SCNC: 24 MMOL/L
CREAT SERPL-MCNC: 0.6 MG/DL
EGFRCR SERPLBLD CKD-EPI 2021: 104 ML/MIN/1.73M2
GLUCOSE SERPL-MCNC: 79 MG/DL
POTASSIUM SERPL-SCNC: 4.2 MMOL/L
PROT SERPL-MCNC: 6.7 G/DL
SODIUM SERPL-SCNC: 140 MMOL/L

## 2025-09-10 ENCOUNTER — APPOINTMENT (OUTPATIENT)
Dept: OBGYN | Facility: CLINIC | Age: 60
End: 2025-09-10
Payer: COMMERCIAL

## 2025-09-10 DIAGNOSIS — R10.2 PELVIC AND PERINEAL PAIN: ICD-10-CM

## 2025-09-10 DIAGNOSIS — N81.10 CYSTOCELE, UNSPECIFIED: ICD-10-CM

## 2025-09-10 PROCEDURE — 99213 OFFICE O/P EST LOW 20 MIN: CPT

## (undated) DEVICE — KIT DEFENDO 4 OLY 4 PC